# Patient Record
Sex: MALE | NOT HISPANIC OR LATINO | Employment: OTHER | URBAN - METROPOLITAN AREA
[De-identification: names, ages, dates, MRNs, and addresses within clinical notes are randomized per-mention and may not be internally consistent; named-entity substitution may affect disease eponyms.]

---

## 2022-03-03 ENCOUNTER — OFFICE VISIT (OUTPATIENT)
Dept: OBGYN CLINIC | Facility: CLINIC | Age: 87
End: 2022-03-03
Payer: COMMERCIAL

## 2022-03-03 VITALS — HEART RATE: 103 BPM | WEIGHT: 150 LBS | SYSTOLIC BLOOD PRESSURE: 123 MMHG | DIASTOLIC BLOOD PRESSURE: 73 MMHG

## 2022-03-03 DIAGNOSIS — M65.341 TRIGGER FINGER, RIGHT RING FINGER: Primary | ICD-10-CM

## 2022-03-03 PROCEDURE — 20550 NJX 1 TENDON SHEATH/LIGAMENT: CPT | Performed by: ORTHOPAEDIC SURGERY

## 2022-03-03 PROCEDURE — 99203 OFFICE O/P NEW LOW 30 MIN: CPT | Performed by: ORTHOPAEDIC SURGERY

## 2022-03-03 RX ORDER — APIXABAN 5 MG/1
TABLET, FILM COATED ORAL
COMMUNITY
Start: 2022-01-25

## 2022-03-03 RX ORDER — ATORVASTATIN CALCIUM 20 MG/1
TABLET, FILM COATED ORAL
COMMUNITY
Start: 2022-01-25

## 2022-03-03 RX ORDER — LIDOCAINE HYDROCHLORIDE 10 MG/ML
2 INJECTION, SOLUTION INFILTRATION; PERINEURAL
Status: COMPLETED | OUTPATIENT
Start: 2022-03-03 | End: 2022-03-03

## 2022-03-03 RX ORDER — ATENOLOL 25 MG/1
TABLET ORAL
COMMUNITY
Start: 2022-01-25

## 2022-03-03 RX ADMIN — LIDOCAINE HYDROCHLORIDE 2 ML: 10 INJECTION, SOLUTION INFILTRATION; PERINEURAL at 08:35

## 2022-03-03 NOTE — PROGRESS NOTES
Assessment:       1  Trigger finger, right ring finger          Plan:        I explained my current clinical findings to Mr Sonny Recinos  He has a locked right hand ring trigger finger possibly for several weeks or even over a month duration  His clinically able to make a  and hold objects from his right hand  At rest he does not complain of any significant pain in his right hand ring finger  I explained to him that the possibilities include surgical release of the affected digit through Hand Orthopedic surgery or accepting the current deformity if he is able to manage most of his activities of daily living  At this time I will make a referral to my hand orthopedic surgery colleagues in this regard  Subjective:     Patient ID: Roosevelt Bear is a 80 y o  male  Chief Complaint:  Right hand ring finger locked    HPI  Mr Sonny Recinos is a 68-year-old right-hand-dominant gentleman who presents today for evaluation of "locked right hand ring finger"  Patient is a poor historian and a nursing home resident  He reports that sometime ago he was trying to  a ball and his right ring "finger got stuck"  Since this incident, he has been unable to straighten his right hand ring finger  Denies any tingling or numbness of the affected digit distally  Social History     Occupational History    Not on file   Tobacco Use    Smoking status: Not on file    Smokeless tobacco: Not on file   Substance and Sexual Activity    Alcohol use: Not on file    Drug use: Not on file    Sexual activity: Not on file      Review of Systems        Objective:     Ortho ExamPhysical Exam  Vitals and nursing note reviewed  Constitutional:       Appearance: He is well-developed  HENT:      Head: Normocephalic and atraumatic  Eyes:      Conjunctiva/sclera: Conjunctivae normal    Cardiovascular:      Rate and Rhythm: Normal rate and regular rhythm     Pulmonary:      Effort: Pulmonary effort is normal  No respiratory distress  Skin:     General: Skin is warm  Findings: No erythema  Neurological:      Mental Status: He is alert and oriented to person, place, and time  Psychiatric:         Behavior: Behavior normal          Thought Content: Thought content normal          Judgment: Judgment normal           Right hand exam:  Right hand ring finger is an an attitude of flexion at MCP, PIP and DIP joints  There is a tender palpable nodule on the volar aspect of the ring finger at the level of A1 pulley  No overlying skin tearing is noted  Unable to extend his right ring finger  Clinically intact distal neurovascular status  Hand/upper extremity injection: R ring A1  Universal Protocol:  Consent: Verbal consent obtained  Risks and benefits: risks, benefits and alternatives were discussed  Consent given by: patient  Patient understanding: patient states understanding of the procedure being performed  Patient consent: the patient's understanding of the procedure matches consent given  Site marked: the operative site was marked  Radiology Images displayed and confirmed  If images not available, report reviewed: imaging studies available  Required items: required blood products, implants, devices, and special equipment available  Patient identity confirmed: verbally with patient    Supporting Documentation  Indications: therapeutic and pain   Procedure Details  Condition:trigger finger Location: ring finger - R ring A1   Needle size: 25 G  Ultrasound guidance: no  Approach: volar  Medications administered: 2 mL lidocaine 1 %    Patient tolerance: patient tolerated the procedure well with no immediate complications  Dressing:  Sterile dressing applied    The right ring finger flexor tendon sheath was injected with lidocaine and a gentle manipulation under local anesthesia was attempted  Unfortunately, release of the trigger finger despite local anesthesia and manipulation was not achieved

## 2023-04-04 PROBLEM — G30.1 MODERATE LATE ONSET ALZHEIMER'S DEMENTIA WITH ANXIETY (HCC): Status: ACTIVE | Noted: 2023-04-04

## 2023-04-04 PROBLEM — S01.01XA SCALP LACERATION: Status: ACTIVE | Noted: 2023-04-04

## 2023-04-04 PROBLEM — F02.B4 MODERATE LATE ONSET ALZHEIMER'S DEMENTIA WITH ANXIETY (HCC): Status: ACTIVE | Noted: 2023-04-04
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
## 2023-04-04 PROBLEM — I48.91 A-FIB (HCC): Status: ACTIVE | Noted: 2023-04-04

## 2023-04-04 PROBLEM — I48.0 PAF (PAROXYSMAL ATRIAL FIBRILLATION) (HCC): Status: ACTIVE | Noted: 2023-04-04

## 2023-04-05 PROBLEM — D69.6 THROMBOCYTOPENIA (HCC): Status: ACTIVE | Noted: 2023-04-05

## 2023-04-05 PROBLEM — E78.5 HYPERLIPIDEMIA: Status: ACTIVE | Noted: 2023-04-05

## 2023-04-05 PROBLEM — H91.90 HEARING LOSS: Status: ACTIVE | Noted: 2023-04-05

## 2023-04-05 PROBLEM — R73.03 PREDIABETES: Status: ACTIVE | Noted: 2023-04-05

## 2023-04-05 PROBLEM — R79.89 ELEVATED SERUM CREATININE: Status: ACTIVE | Noted: 2023-04-05

## 2023-04-06 PROBLEM — N17.9 AKI (ACUTE KIDNEY INJURY) (HCC): Status: ACTIVE | Noted: 2023-04-05

## 2023-04-06 PROBLEM — A41.9 SEPSIS (HCC): Status: ACTIVE | Noted: 2023-04-06

## 2023-04-09 PROBLEM — M10.9 ACUTE GOUT OF LEFT FOOT: Status: ACTIVE | Noted: 2023-04-09

## 2023-04-11 PROBLEM — N17.9 AKI (ACUTE KIDNEY INJURY) (HCC): Status: RESOLVED | Noted: 2023-04-05 | Resolved: 2023-04-11

## 2023-04-11 PROBLEM — A41.9 SEPSIS (HCC): Status: RESOLVED | Noted: 2023-04-06 | Resolved: 2023-04-11

## 2023-04-25 ENCOUNTER — TELEPHONE (OUTPATIENT)
Dept: OTHER | Facility: OTHER | Age: 88
End: 2023-04-25

## 2023-04-25 NOTE — TELEPHONE ENCOUNTER
Parviz Spangler from [place] is calling regarding cancelling an appointment  Date/Time: 4/25/23 11:40     Patient was rescheduled: YES [] NO [x]    Patient requesting call back to reschedule: YES [] NO [x]    Parviz Spangler states they will call back to reschedule

## 2023-05-08 PROBLEM — Z09 HOSPITAL DISCHARGE FOLLOW-UP: Status: ACTIVE | Noted: 2023-05-08

## 2023-05-08 PROBLEM — I48.0 PAF (PAROXYSMAL ATRIAL FIBRILLATION) (HCC): Status: RESOLVED | Noted: 2023-04-04 | Resolved: 2023-05-08

## 2023-05-09 ENCOUNTER — OFFICE VISIT (OUTPATIENT)
Dept: CARDIOLOGY CLINIC | Facility: CLINIC | Age: 88
End: 2023-05-09

## 2023-05-09 VITALS
SYSTOLIC BLOOD PRESSURE: 98 MMHG | OXYGEN SATURATION: 98 % | DIASTOLIC BLOOD PRESSURE: 50 MMHG | HEART RATE: 50 BPM | BODY MASS INDEX: 22.73 KG/M2 | WEIGHT: 158.8 LBS | HEIGHT: 70 IN
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
## 2023-05-09 DIAGNOSIS — Z09 HOSPITAL DISCHARGE FOLLOW-UP: Primary | ICD-10-CM

## 2023-05-09 DIAGNOSIS — E78.2 MIXED HYPERLIPIDEMIA: ICD-10-CM

## 2023-05-09 DIAGNOSIS — I48.0 PAROXYSMAL ATRIAL FIBRILLATION (HCC): ICD-10-CM

## 2023-05-09 DIAGNOSIS — F02.B4 MODERATE LATE ONSET ALZHEIMER'S DEMENTIA WITH ANXIETY (HCC): ICD-10-CM

## 2023-05-09 DIAGNOSIS — G30.1 MODERATE LATE ONSET ALZHEIMER'S DEMENTIA WITH ANXIETY (HCC): ICD-10-CM

## 2023-05-09 RX ORDER — ALBUTEROL SULFATE 2.5 MG/3ML
SOLUTION RESPIRATORY (INHALATION)
COMMUNITY
Start: 2023-03-08

## 2023-05-09 RX ORDER — APIXABAN 5 MG/1
5 TABLET, FILM COATED ORAL 2 TIMES DAILY
Start: 2023-05-09 | End: 2023-05-09 | Stop reason: SDUPTHER

## 2023-05-09 RX ORDER — IPRATROPIUM BROMIDE AND ALBUTEROL SULFATE 2.5; .5 MG/3ML; MG/3ML
SOLUTION RESPIRATORY (INHALATION)
COMMUNITY
Start: 2023-03-07

## 2023-05-09 RX ORDER — ACETAMINOPHEN 325 MG/1
325 TABLET ORAL EVERY 4 HOURS PRN
COMMUNITY

## 2023-05-09 NOTE — PROGRESS NOTES
Cardiology  Hospital Follow Up   Office Visit Note  Kinza Necessary   80 y o    male   MRN: [id number]  [address]  [address]  [place] [address] [facility]  [address]  [address]  [phone]    PCP: Mary Humphrey MD  Cardiologist: will be Dr Shelia Salmon      @ [facility]                Summary of recommendations  We will discontinue Cardizem CD given bradycardia and hypotension  Follow up will be scheduled with Dr Shelia Salmon        Assessment/plan  Recent unwitnessed fall, requiring hospitalization 4/4-4/11/23 , found to have A-fib with RVR, followed by Cardiology  Elevated rates in the setting of sepsis, fever  Known to have A-fib previously on atenolol, and Eliquis  No traumatic injuries  PAF  · Now rate controlled with metoprolol tartrate 12 5 mg every 12, Cardizem  mg daily  Cardizem new  Previously on atenolol  · We will stop Cardizem given bradycardia and hypotension   · OAC with Eliquis 5 mg twice daily  · EKG today A-fib 49 bpm  Aortic insufficiency mild to moderate  Chronic thrombocytopenia platelets hovering around 90-120K  Hypertension, essential   BP 98/50  DC diltiazem  Hyperlipidemia, off statin therapy  Acute gout left foot, discharged with short course of colchicine  Alz Dementia  Cardiac testing  • TTE 4/5/23  EF 60%  No RWMA  RV cavity dilated  Mild RICKY  Mild to moderate AI  Mild MR  Mild TR   RVSP 41 mmHg  Aortic root normal in size aortic root exhibiting moderate fibrocalcific change                HPI  Kinza Necessary is a 79 yo male with atrial fibrillation on [address] with Eliquis, Alzheimers dementia, hypertension, dyslipidemia  Tu Lout He is a resident at [address] [place] [address] and he does not follow with cardiology  He has been maintained on rate control with atenolol  Adm 4/4-4/11/23  CC: Unwitnessed fall  History limited due to patient's dementia  Was found down  Patient denied chest pain shortness of breath or palpitations    Negative trauma work-up  Rx for Right fabrizio lac and abrasions  Treated for A-fib with RVR heart rates in the 140s, evaluated by Cardiology  Elevated rates were in the setting of sepsis, fever treated by the primary team  Fever 102  Discharged on metoprolol 12 5 mg twice daily, Cardizem  mg daily- new  Atenolol discontinued  DC back to SNF  Rx for acute gout with colchicine 0 6 mg twice daily for a few days    5/9/23  Hospital follow-up  He is accompanied by transport staff  Review of systems unable to given severe dementia  Patient is unable to give me his birthday  Disoriented, x3  EKG A-fib slow ventricular response 49 bpm  BP 98/50  We will DC diltiazem  Continue otherwise current plan     I have spent 25 minutes with Patient today in which greater than 50% of this time was spent in counseling/coordination of care regarding Patient and family education, Importance of tx compliance, Documenting in the medical record and Reviewing / ordering tests, medicine, procedures    Assessment  Diagnoses and all orders for this visit:    Hospital discharge follow-up    Fall, subsequent encounter    Mixed hyperlipidemia    Paroxysmal atrial fibrillation ([facility] [address] )  -     POCT ECG  -     Discontinue: Eliquis 5 MG; Take 1 tablet (5 mg total) by mouth 2 (two) times a day    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 ([facility] [address] )    Other orders  -     ipratropium-albuterol (DUO-NEB) 0 5-2 5 mg/3 mL nebulizer solution  -     albuterol (2 5 mg/3 mL) 0 083 % nebulizer solution  -     acetaminophen (TYLENOL) 325 mg tablet;  Take 325 mg by mouth every 4 (four) hours as needed for mild pain 2 tablets          Past Medical History:   Diagnosis Date   • Anxiety    • Atrial fibrillation (HCC)    • Dementia (HCC)    • Disorder of kidney and ureter    • Dupuytren's disease    • Fibromatosis    • Hyperlipidemia    • Hypertension    • PAF (paroxysmal atrial fibrillation) (HCC)    • Thrombocytopenia (HCC)    • Tinea unguium    • Vascular disease Review of Systems   Unable to perform ROS: dementia       No Known Allergies        Current Outpatient Medications:   •  acetaminophen (TYLENOL) 325 mg tablet, Take 325 mg by mouth every 4 (four) hours as needed for mild pain 2 tablets, Disp: , Rfl:   •  albuterol (2 5 mg/3 mL) 0 083 % nebulizer solution, , Disp: , Rfl:   •  apixaban (ELIQUIS) 5 mg, Take 1 tablet (5 mg total) by mouth 2 (two) times a day, Disp: , Rfl: 0  •  cholecalciferol (VITAMIN D3) 1,000 units tablet, Take 2 tablets (2,000 Units total) by mouth daily Do not start before April 12, 2023 , Disp: , Rfl: 0  •  colchicine (COLCRYS) 0 6 mg tablet, Take 1 tablet (0 6 mg total) by mouth 2 (two) times a day for 5 doses, Disp: 5 tablet, Rfl: 0  •  cyanocobalamin (VITAMIN B-12) 2000 MCG tablet, Take 1 tablet (2,000 mcg total) by mouth daily Do not start before April 12, 2023 , Disp: , Rfl: 0  •  ipratropium-albuterol (DUO-NEB) 0 5-2 5 mg/3 mL nebulizer solution, , Disp: , Rfl:   •  metoprolol tartrate (LOPRESSOR) 25 mg tablet, Take 0 5 tablets (12 5 mg total) by mouth every 12 (twelve) hours, Disp: , Rfl: 0  •  QUEtiapine (SEROquel) 25 mg tablet, Take 0 5 tablets (12 5 mg total) by mouth in the morning, Disp: , Rfl: 0        Social History     Socioeconomic History   • Marital status: /Civil Union     Spouse name: Not on file   • Number of children: Not on file   • Years of education: Not on file   • Highest education level: Not on file   Occupational History   • Not on file   Tobacco Use   • Smoking status: Never     Passive exposure: Never   • Smokeless tobacco: Never   Substance and Sexual Activity   • Alcohol use: Not on file   • Drug use: Not on file   • Sexual activity: Not on file   Other Topics Concern   • Not on file   Social History Narrative    ** Merged History Encounter **          Social Determinants of Health     Financial Resource Strain: Not on file   Food Insecurity: Not on file   Transportation Needs: Not on file   Physical "Activity: Not on file   Stress: Not on file   Social Connections: Not on file   Intimate Partner Violence: Not on file   Housing Stability: Not on file       History reviewed  No pertinent family history  Physical Exam  Vitals and nursing note reviewed  Constitutional:       General: He is not in acute distress  HENT:      Head: Normocephalic and atraumatic  Eyes:      Conjunctiva/sclera: Conjunctivae normal    Cardiovascular:      Rate and Rhythm: Regular rhythm  Bradycardia present  Pulses: Intact distal pulses  Heart sounds: Normal heart sounds  Pulmonary:      Effort: Pulmonary effort is normal       Breath sounds: Normal breath sounds  Abdominal:      General: Bowel sounds are normal       Palpations: Abdomen is soft  Musculoskeletal:         General: Normal range of motion  Cervical back: Normal range of motion and neck supple  Skin:     General: Skin is warm and dry  Neurological:      Mental Status: He is alert  Comments: At baseline         Vitals: Blood pressure 98/50, pulse (!) 50, height 5' 10\" (1 778 m), weight 72 kg (158 lb 12 8 oz), SpO2 98 %  Wt Readings from Last 3 Encounters:   05/09/23 72 kg (158 lb 12 8 oz)   04/05/23 74 kg (163 lb 2 3 oz)   03/03/22 68 kg (150 lb)         Labs & Results:  Lab Results   Component Value Date    WBC 6 77 04/10/2023    HGB 14 6 04/10/2023    HCT 45 1 04/10/2023     (H) 04/10/2023     (L) 04/10/2023     No results found for: BNP  No components found for: CHEM  No results found for: CKTOTAL, TROPONINI, TROPONINT, CKMBINDEX  No results found for this or any previous visit  No results found for this or any previous visit  This note was completed in part utilizing [email or web address] fluency direct voice recognition software     Grammatical errors, random word insertion, spelling mistakes, and incomplete sentences may be an occasional consequence of the system secondary to software limitations, ambient noise and hardware " issues  At the time of dictation, efforts were made to edit, clarify and /or correct errors  Please read the chart carefully and recognize, using context, where substitutions have occurred    If you have any questions or concerns about the context, text or information contained within the body of this dictation, please contact myself, the provider, for further clarification

## 2023-05-09 NOTE — LETTER
May 9, 2023     Cheri Barrientos MD  [address]  [address]  [address] [facility]    Patient: Demetrios Opitz   YOB: 1931   Date of Visit: 5/9/2023       Dear Dr Lakisha Francisco:    Thank you for referring Demetrios Opitz to me for evaluation  Below are my notes for this consultation  If you have questions, please do not hesitate to call me  I look forward to following your patient along with you  Sincerely,        BEBO Gracia        CC: MD John Mcgarry CRNP  5/9/2023 12:07 PM  Sign when [facility] Visit  Cardiology  Hospital Follow Up   Office Visit Note  Demetrios Opitz   80 y o    male   MRN: [id number]  [address]  [address]  [address] [address] [phone]    PCP: Cheri Barrientos MD  Cardiologist: will be Dr Daniel Dey      @ [facility]                Summary of recommendations  We will discontinue Cardizem CD given bradycardia and hypotension  Follow up will be scheduled with Dr Daniel Dey        Assessment/plan  Recent unwitnessed fall, requiring hospitalization 4/4-4/11/23 , found to have A-fib with RVR, followed by Cardiology  Elevated rates in the setting of sepsis, fever  Known to have A-fib previously on atenolol, and Eliquis  No traumatic injuries  PAF  · Now rate controlled with metoprolol tartrate 12 5 mg every 12, Cardizem  mg daily  Cardizem new  Previously on atenolol  · We will stop Cardizem given bradycardia and hypotension   · OAC with Eliquis 5 mg twice daily  · EKG today A-fib 49 bpm  Aortic insufficiency mild to moderate  Chronic thrombocytopenia platelets hovering around 90-120K  Hypertension, essential   BP 98/50  DC diltiazem  Hyperlipidemia, off statin therapy  Acute gout left foot, discharged with short course of colchicine  Alz Dementia  Cardiac testing  • TTE 4/5/23  EF 60%  No RWMA  RV cavity dilated  Mild RICKY  Mild to moderate AI  Mild MR  Mild TR   RVSP 41 mmHg  Aortic root normal in size aortic root exhibiting moderate fibrocalcific change                HPI  Tariq Hernandez is a 79 yo male with atrial fibrillation on [address] with Eliquis, Alzheimers dementia, hypertension, dyslipidemia  Sushila Toussaint He is a resident at [facility] and he does not follow with cardiology  He has been maintained on rate control with atenolol  Adm 4/4-4/11/23  CC: Unwitnessed fall  History limited due to patient's dementia  Was found down  Patient denied chest pain shortness of breath or palpitations  Negative trauma work-up  Rx for Right fabrizio lac and abrasions  Treated for A-fib with RVR heart rates in the 140s, evaluated by Cardiology  Elevated rates were in the setting of sepsis, fever treated by the primary team  Fever 102  Discharged on metoprolol 12 5 mg twice daily, Cardizem  mg daily- new  Atenolol discontinued  DC back to SNF  Rx for acute gout with colchicine 0 6 mg twice daily for a few days    5/9/23  Hospital follow-up  He is accompanied by transport staff  Review of systems unable to given severe dementia  Patient is unable to give me his birthday  Disoriented, x3  EKG A-fib slow ventricular response 49 bpm  BP 98/50  We will DC diltiazem  Continue otherwise current plan     I have spent 25 minutes with Patient today in which greater than 50% of this time was spent in counseling/coordination of care regarding Patient and family education, Importance of tx compliance, Documenting in the medical record and Reviewing / ordering tests, medicine, procedures    Assessment  Diagnoses and all orders for this visit:    Hospital discharge follow-up    Fall, subsequent encounter    Mixed hyperlipidemia    Paroxysmal atrial fibrillation ([facility] [address] )  -     POCT ECG  -     Discontinue: Eliquis 5 MG;  Take 1 tablet (5 mg total) by mouth 2 (two) times a day    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 ([address] )    Other orders  - ipratropium-albuterol (DUO-NEB) 0 5-2 5 mg/3 mL nebulizer solution  -     albuterol (2 5 mg/3 mL) 0 083 % nebulizer solution  -     acetaminophen (TYLENOL) 325 mg tablet; Take 325 mg by mouth every 4 (four) hours as needed for mild pain 2 tablets          Past Medical History:   Diagnosis Date   • Anxiety    • Atrial fibrillation (HCC)    • Dementia (HCC)    • Disorder of kidney and ureter    • Dupuytren's disease    • Fibromatosis    • Hyperlipidemia    • Hypertension    • PAF (paroxysmal atrial fibrillation) (HCC)    • Thrombocytopenia (HCC)    • Tinea unguium    • Vascular disease        Review of Systems   Unable to perform ROS: dementia       No Known Allergies        Current Outpatient Medications:   •  acetaminophen (TYLENOL) 325 mg tablet, Take 325 mg by mouth every 4 (four) hours as needed for mild pain 2 tablets, Disp: , Rfl:   •  albuterol (2 5 mg/3 mL) 0 083 % nebulizer solution, , Disp: , Rfl:   •  apixaban (ELIQUIS) 5 mg, Take 1 tablet (5 mg total) by mouth 2 (two) times a day, Disp: , Rfl: 0  •  cholecalciferol (VITAMIN D3) 1,000 units tablet, Take 2 tablets (2,000 Units total) by mouth daily Do not start before April 12, 2023 , Disp: , Rfl: 0  •  colchicine (COLCRYS) 0 6 mg tablet, Take 1 tablet (0 6 mg total) by mouth 2 (two) times a day for 5 doses, Disp: 5 tablet, Rfl: 0  •  cyanocobalamin (VITAMIN B-12) 2000 MCG tablet, Take 1 tablet (2,000 mcg total) by mouth daily Do not start before April 12, 2023 , Disp: , Rfl: 0  •  ipratropium-albuterol (DUO-NEB) 0 5-2 5 mg/3 mL nebulizer solution, , Disp: , Rfl:   •  metoprolol tartrate (LOPRESSOR) 25 mg tablet, Take 0 5 tablets (12 5 mg total) by mouth every 12 (twelve) hours, Disp: , Rfl: 0  •  QUEtiapine (SEROquel) 25 mg tablet, Take 0 5 tablets (12 5 mg total) by mouth in the morning, Disp: , Rfl: 0        Social History     Socioeconomic History   • Marital status: /Civil Union     Spouse name: Not on file   • Number of children: Not on file   • "Years of education: Not on file   • Highest education level: Not on file   Occupational History   • Not on file   Tobacco Use   • Smoking status: Never     Passive exposure: Never   • Smokeless tobacco: Never   Substance and Sexual Activity   • Alcohol use: Not on file   • Drug use: Not on file   • Sexual activity: Not on file   Other Topics Concern   • Not on file   Social History Narrative    ** Merged History Encounter **          Social Determinants of Health     Financial Resource Strain: Not on file   Food Insecurity: Not on file   Transportation Needs: Not on file   Physical Activity: Not on file   Stress: Not on file   Social Connections: Not on file   Intimate Partner Violence: Not on file   Housing Stability: Not on file       History reviewed  No pertinent family history  Physical Exam  Vitals and nursing note reviewed  Constitutional:       General: He is not in acute distress  HENT:      Head: Normocephalic and atraumatic  Eyes:      Conjunctiva/sclera: Conjunctivae normal    Cardiovascular:      Rate and Rhythm: Regular rhythm  Bradycardia present  Pulses: Intact distal pulses  Heart sounds: Normal heart sounds  Pulmonary:      Effort: Pulmonary effort is normal       Breath sounds: Normal breath sounds  Abdominal:      General: Bowel sounds are normal       Palpations: Abdomen is soft  Musculoskeletal:         General: Normal range of motion  Cervical back: Normal range of motion and neck supple  Skin:     General: Skin is warm and dry  Neurological:      Mental Status: He is alert  Comments: At baseline         Vitals: Blood pressure 98/50, pulse (!) 50, height 5' 10\" (1 778 m), weight 72 kg (158 lb 12 8 oz), SpO2 98 %     Wt Readings from Last 3 Encounters:   05/09/23 72 kg (158 lb 12 8 oz)   04/05/23 74 kg (163 lb 2 3 oz)   03/03/22 68 kg (150 lb)         Labs & Results:  Lab Results   Component Value Date    WBC 6 77 04/10/2023    HGB 14 6 04/10/2023    HCT " 45 1 04/10/2023     (H) 04/10/2023     (L) 04/10/2023     No results found for: BNP  No components found for: CHEM  No results found for: CKTOTAL, TROPONINI, TROPONINT, CKMBINDEX  No results found for this or any previous visit  No results found for this or any previous visit  This note was completed in part utilizing [employer] fluency direct voice recognition software  Grammatical errors, random word insertion, spelling mistakes, and incomplete sentences may be an occasional consequence of the system secondary to software limitations, ambient noise and hardware issues  At the time of dictation, efforts were made to edit, clarify and /or correct errors  Please read the chart carefully and recognize, using context, where substitutions have occurred    If you have any questions or concerns about the context, text or information contained within the body of this dictation, please contact myself, the provider, for further clarification

## 2023-06-03 PROBLEM — S01.01XA SCALP LACERATION: Status: RESOLVED | Noted: 2023-04-04 | Resolved: 2023-06-03

## 2023-08-17 ENCOUNTER — OFFICE VISIT (OUTPATIENT)
Dept: CARDIOLOGY CLINIC | Facility: CLINIC | Age: 88
End: 2023-08-17
Payer: COMMERCIAL

## 2023-08-17 VITALS
WEIGHT: 157.2 LBS | OXYGEN SATURATION: 97 % | HEART RATE: 126 BPM | DIASTOLIC BLOOD PRESSURE: 62 MMHG | HEIGHT: 70 IN | BODY MASS INDEX: 22.5 KG/M2 | SYSTOLIC BLOOD PRESSURE: 116 MMHG

## 2023-08-17 DIAGNOSIS — F02.80 ALZHEIMER'S DEMENTIA, UNSPECIFIED DEMENTIA SEVERITY, UNSPECIFIED TIMING OF DEMENTIA ONSET, UNSPECIFIED WHETHER BEHAVIORAL, PSYCHOTIC, OR MOOD DISTURBANCE OR ANXIETY (HCC): ICD-10-CM

## 2023-08-17 DIAGNOSIS — G30.9 ALZHEIMER'S DEMENTIA, UNSPECIFIED DEMENTIA SEVERITY, UNSPECIFIED TIMING OF DEMENTIA ONSET, UNSPECIFIED WHETHER BEHAVIORAL, PSYCHOTIC, OR MOOD DISTURBANCE OR ANXIETY (HCC): ICD-10-CM

## 2023-08-17 DIAGNOSIS — E78.2 MIXED HYPERLIPIDEMIA: ICD-10-CM

## 2023-08-17 DIAGNOSIS — I48.20 CHRONIC ATRIAL FIBRILLATION WITH RAPID VENTRICULAR RESPONSE (HCC): Primary | ICD-10-CM

## 2023-08-17 PROCEDURE — 93000 ELECTROCARDIOGRAM COMPLETE: CPT | Performed by: INTERNAL MEDICINE

## 2023-08-17 PROCEDURE — 99214 OFFICE O/P EST MOD 30 MIN: CPT | Performed by: INTERNAL MEDICINE

## 2023-08-17 RX ORDER — ALUMINUM HYDROXIDE, MAGNESIUM HYDROXIDE, SIMETHICONE 400; 400; 40 MG/10ML; MG/10ML; MG/10ML
30 SUSPENSION ORAL
COMMUNITY

## 2023-08-17 RX ORDER — LOPERAMIDE HYDROCHLORIDE 2 MG/1
2 TABLET ORAL 4 TIMES DAILY PRN
COMMUNITY

## 2023-08-17 NOTE — PROGRESS NOTES
[facility] [place]   [address]  [address]  [facility] [address]                                            Cardiology Office Follow up  Ana Bryan, 80 y.o. male  YOB: 1931  MRN: [id number] Encounter: [id number]      PCP - Annie Valverde MD  Referring Provider - Self, Referral    Chief Complaint   Patient presents with   • Follow-up     3 month check up per Josie Ha A-Fib : Pt resides in [address]        Assessment  Chronic Atrial fibrillation  Alzheimer's dementia  Hypertension  H/o Falls    Plan  Chronic atrial fibrillation with RVR  Overview  4/2023: admitted after unwitnessed fall, with possible sepsis, noted to have elevated HR  I saw inpatient and uptitrate metoprolol to 75 mg bid, cardizem  mg daily  Prior to discharge, his HR was too slow -->  decreased metoprolol to 12.5 mg bid  5/9/2023: saw Josie Ha --> Afib, HR 49 bpm --> cardizem  discontinued  8/2023: HR in afib again elevated at 125 bpm  Impression  HR in Afib has been labile, he is asymptomatic otherwise, although cannot provide much history  Plan  Increase metoprolol to 25 mg twice daily  Monitor HR daily at [facility],  And notify us if HR persistently > 120 bpm  Continue eliquis    Results for orders placed or performed in visit on 08/17/23   POCT ECG    Impression    Atrial fibrillation with rapid ventricular response ( bpm)  Nonspecific T wave changes       Orders Placed This Encounter   Procedures   • POCT ECG     Return in about 4 weeks (around 9/14/2023), or if symptoms worsen or fail to improve. History of Present Illness   80 y.o. male , whom I originally met at [address] [place] [address] in April 2023 when he was admitted from [address] [place] [address] with an unwitnessed fall. At that time was noted to have atrial fibrillation with rapid rates. He was noted to have sepsis as well.   His rate control meds for A-fib were adjusted with improvement, but towards the end of hospitalization he became bradycardic, and as a result metoprolol was decreased. He has been in [address] [place] [address] since then and did have a follow-up visit with Hadley Uribe in May 2023. At that time his heart rate continued to be slow and as a result Cardizem was also discontinued. Interval history - 8/17/2023  Today he is here for follow-up and does not report any chest pain, shortness of breath, or any palpitation, although with his dementia his history is quite unreliable    Historical Information   Past Medical History:   Diagnosis Date   • Anxiety    • Atrial fibrillation ([address])    • Dementia ([address])    • Disorder of kidney and ureter    • Dupuytren's disease    • Fibromatosis    • Hyperlipidemia    • Hypertension    • PAF (paroxysmal atrial fibrillation) ([address])    • Thrombocytopenia (HCC)    • Tinea unguium    • Vascular disease      History reviewed. No pertinent surgical history.   Family History   Problem Relation Age of Onset   • No Known Problems Mother    • No Known Problems Father      Current Outpatient Medications on File Prior to Visit   Medication Sig Dispense Refill   • acetaminophen (TYLENOL) 325 mg tablet Take 325 mg by mouth every 4 (four) hours as needed for mild pain 2 tablets     • aluminum-magnesium hydroxide-simethicone (MAALOX) 200-200-20 MG/5ML SUSP Take 30 mL by mouth 4 (four) times a day (before meals and at bedtime)     • apixaban (ELIQUIS) 5 mg Take 1 tablet (5 mg total) by mouth 2 (two) times a day  0   • cholecalciferol (VITAMIN D3) 1,000 units tablet Take 2 tablets (2,000 Units total) by mouth daily Do not start before April 12, 2023.  0   • cyanocobalamin (VITAMIN B-12) 2000 MCG tablet Take 1 tablet (2,000 mcg total) by mouth daily Do not start before April 12, 2023.  0   • loperamide (Imodium A-D) 2 MG tablet Take 2 mg by mouth 4 (four) times a day as needed for diarrhea     • [DISCONTINUED] metoprolol tartrate (LOPRESSOR) 25 mg tablet Take 0.5 tablets (12.5 mg total) by mouth every 12 (twelve) hours  0   • albuterol (2.5 mg/3 mL) 0.083 % nebulizer solution      • colchicine (COLCRYS) 0.6 mg tablet Take 1 tablet (0.6 mg total) by mouth 2 (two) times a day for 5 doses 5 tablet 0   • ipratropium-albuterol (DUO-NEB) 0.5-2.5 mg/3 mL nebulizer solution  (Patient not taking: Reported on 8/17/2023)     • QUEtiapine (SEROquel) 25 mg tablet Take 0.5 tablets (12.5 mg total) by mouth in the morning (Patient not taking: Reported on 8/17/2023)  0     No current facility-administered medications on file prior to visit. No Known Allergies  Social History     Socioeconomic History   • Marital status: /Civil Union     Spouse name: None   • Number of children: None   • Years of education: None   • Highest education level: None   Occupational History   • None   Tobacco Use   • Smoking status: Never     Passive exposure: Never   • Smokeless tobacco: Never   Vaping Use   • Vaping Use: Never used   Substance and Sexual Activity   • Alcohol use: None   • Drug use: None   • Sexual activity: None   Other Topics Concern   • None   Social History Narrative    ** Merged History Encounter **          Social Determinants of Health     Financial Resource Strain: Not on file   Food Insecurity: Not on file   Transportation Needs: Not on file   Physical Activity: Not on file   Stress: Not on file   Social Connections: Not on file   Intimate Partner Violence: Not on file   Housing Stability: Not on file        Review of Systems   All other systems reviewed and are negative. Vitals:  Vitals:    08/17/23 1026   BP: 116/62   BP Location: Left arm   Patient Position: Sitting   Cuff Size: Large   Pulse: (!) 126   SpO2: 97%   Weight: 71.3 kg (157 lb 3.2 oz)   Height: 5' 10" (1.778 m)     BMI - Body mass index is 22.56 kg/m².   Wt Readings from Last 7 Encounters:   08/17/23 71.3 kg (157 lb 3.2 oz)   05/09/23 72 kg (158 lb 12.8 oz)   04/05/23 74 kg (163 lb 2.3 oz)   03/03/22 68 kg (150 lb)       Physical Exam  Vitals and nursing note reviewed. Constitutional:       General: He is not in acute distress. Appearance: Normal appearance. He is well-developed and normal weight. He is not ill-appearing. HENT:      Head: Normocephalic and atraumatic. Nose: No congestion. Eyes:      General: No scleral icterus. Conjunctiva/sclera: Conjunctivae normal.   Neck:      Vascular: No carotid bruit or JVD. Cardiovascular:      Rate and Rhythm: Tachycardia present. Rhythm irregular. Heart sounds: Normal heart sounds. No murmur heard. No friction rub. No gallop. Pulmonary:      Effort: Pulmonary effort is normal. No respiratory distress. Breath sounds: Normal breath sounds. No wheezing or rales. Chest:      Chest wall: No tenderness. Abdominal:      General: There is no distension. Palpations: Abdomen is soft. Tenderness: There is no abdominal tenderness. Musculoskeletal:         General: No swelling, tenderness or deformity. Cervical back: Neck supple. No muscular tenderness. Right lower leg: No edema. Left lower leg: No edema. Skin:     General: Skin is warm. Neurological:      Mental Status: He is alert. Mental status is at baseline. Psychiatric:         Mood and Affect: Mood normal.         Speech: Speech normal.         Behavior: Behavior normal. Behavior is cooperative. Cognition and Memory: Cognition is impaired. Memory is impaired.            Labs:  CBC:   Lab Results   Component Value Date    WBC 6.77 04/10/2023    RBC 4.47 04/10/2023    HGB 14.6 04/10/2023    HCT 45.1 04/10/2023     (H) 04/10/2023     (L) 04/10/2023    RDW 13.1 04/10/2023       CMP:   Lab Results   Component Value Date    K 4.5 04/10/2023     04/10/2023    CO2 25 04/10/2023    BUN 24 04/10/2023    CREATININE 1.06 04/10/2023    EGFR 61 04/10/2023    CALCIUM 8.9 04/10/2023    AST 29 04/06/2023    ALT 12 04/06/2023    ALKPHOS 52 04/06/2023       Magnesium:  Lab Results   Component Value Date    MG 2.1 04/06/2023       Lipid Profile:   Lab Results   Component Value Date    HDL 67 04/05/2023    TRIG 77 04/05/2023    LDLCALC 90 04/05/2023       Thyroid Studies:   Lab Results   Component Value Date    VEL0LGCXMEQY 1.133 04/05/2023    FREET4 0.95 04/05/2023       A1c:  No components found for: "HGA1C"    INR:  No results found for: "INR"5    Imaging: No results found. Cardiac testing:   No results found for this or any previous visit. No results found for this or any previous visit. No results found for this or any previous visit. No results found for this or any previous visit. XR chest portable  Narrative: CHEST RADIOGRAPH    INDICATION:   fever. COMPARISON:  Chest radiograph 4/6/2023. EXAM PERFORMED/VIEWS:  XR CHEST PORTABLE  Technique: AP portable semierect. Images:  1. FINDINGS:      Lines/Tubes/Devices: None. Mediastinum: Cardiomediastinal silhouette appears within normal limits. Lungs: Mild pulmonary vascular congestion which is slightly improved from most recent exam. Worsened left retrocardiac airspace opacity. Pleura: No pleural effusion. No pneumothorax within the limitations of a portable exam.    Bones: Osseous structures appear within normal limits for patient age. Impression: Worsened retrocardiac airspace opacity at the left lung base concerning for aspiration and/or pneumonia.        Workstation performed: [id number]

## 2023-10-16 ENCOUNTER — TELEPHONE (OUTPATIENT)
Age: 88
End: 2023-10-16

## 2023-10-16 NOTE — TELEPHONE ENCOUNTER
Caller: Patient     Doctor/Office: n/a    Call regarding :  great toe fx      Call was transferred to: POD

## 2023-10-16 NOTE — TELEPHONE ENCOUNTER
Hello,  Please advise if the following patient can be forced onto the schedule:    Patient: Winsome Parker    : 1931    MRN: [id number]    Call back #: [phone]/NEJJO the transport person is arranging this appt. Insurance: Medicare    Reason for appointment: FX of left grt toe     Requested doctor/location: Dr. Dyan Desai      Thank you.

## 2023-10-17 ENCOUNTER — TELEPHONE (OUTPATIENT)
Dept: PODIATRY | Facility: CLINIC | Age: 88
End: 2023-10-17

## 2023-10-17 NOTE — TELEPHONE ENCOUNTER
Caller: [facility]ma[place]    Doctor: Pete Cho DPM    Reason for call: Retia Peabody has a non displaced fx, left great toe. X-ray report being faxed to the office. X-rays taken at [facility].    Please call with an appointment    Call back:  [phone] - Padmaja

## 2023-10-19 ENCOUNTER — OFFICE VISIT (OUTPATIENT)
Dept: PODIATRY | Facility: CLINIC | Age: 88
End: 2023-10-19

## 2023-10-19 VITALS
HEIGHT: 70 IN | OXYGEN SATURATION: 99 % | DIASTOLIC BLOOD PRESSURE: 96 MMHG | HEART RATE: 90 BPM | BODY MASS INDEX: 22.56 KG/M2 | SYSTOLIC BLOOD PRESSURE: 166 MMHG

## 2023-10-19 DIAGNOSIS — S92.415A CLOSED NONDISPLACED FRACTURE OF PROXIMAL PHALANX OF LEFT GREAT TOE, INITIAL ENCOUNTER: Primary | ICD-10-CM

## 2023-10-19 NOTE — PROGRESS NOTES
Assessment/Plan:     The patient's clinical examination today significant for ecchymosis to the dorsal lateral aspect of the left great toe along the base of the proximal phalanx. Mild tenderness to the palpation is noted with palpation to the base of the proximal phalanx of the left great toe. There is very mild tenderness with passive range of motion of the first MTPJ. There are no open lesions. Neurovascular status is grossly intact. The patient's recent x-ray report of the left foot dated October 15, 2023 was appreciated and was significant for a nondisplaced intra-articular avulsion fracture at the medial aspect at the base of the proximal phalanx of the left great toe. The patient is doing fairly well from a clinical standpoint. There is very mild edema and resolving ecchymosis to the left great toe. There is relatively mild tenderness palpation although the patient is not a reliable historian given his history of Alzheimer's dementia. He does not appear to be in any significant distress today nor with palpation and passive range of motion of the left great toe joint. I would recommend that we continue guarded weightbearing to the left foot with his surgical shoe for at least another 4 weeks. I recommend follow-up in my office in 2 weeks for repeat x-rays of the left foot to assess healing of the fracture site. Diagnoses and all orders for this visit:    Closed nondisplaced fracture of proximal phalanx of left great toe, initial encounter          Subjective:     Patient ID: Corbin Hercules is a 80 y.o. male. The patient presents today for his initial consultation with [facility] podiatry with a chief complaint of a left great toe fracture. He was sent from a local nursing home where x-rays dated October 15, 2023 revealed a nondisplaced fracture to his left great toe. He is currently in a surgical shoe.   He is unsure of how the fracture occurred, however he does note that he may have stopped his foot against furniture at home. The patient is a poor historian secondary to his history of Alzheimer's dementia. PAST MEDICAL HISTORY:  Past Medical History:   Diagnosis Date    Anxiety     Atrial fibrillation (HCC)     Dementia (HCC)     Disorder of kidney and ureter     Dupuytren's disease     Fibromatosis     Hyperlipidemia     Hypertension     PAF (paroxysmal atrial fibrillation) ([facility])     Thrombocytopenia (HCC)     Tinea unguium     Vascular disease        PAST SURGICAL HISTORY:  History reviewed. No pertinent surgical history. ALLERGIES:  Patient has no known allergies.     MEDICATIONS:  Current Outpatient Medications   Medication Sig Dispense Refill    acetaminophen (TYLENOL) 325 mg tablet Take 325 mg by mouth every 4 (four) hours as needed for mild pain 2 tablets      albuterol (2.5 mg/3 mL) 0.083 % nebulizer solution       aluminum-magnesium hydroxide-simethicone (MAALOX) 200-200-20 MG/5ML SUSP Take 30 mL by mouth 4 (four) times a day (before meals and at bedtime)      apixaban (ELIQUIS) 5 mg Take 1 tablet (5 mg total) by mouth 2 (two) times a day  0    cholecalciferol (VITAMIN D3) 1,000 units tablet Take 2 tablets (2,000 Units total) by mouth daily Do not start before April 12, 2023.  0    cyanocobalamin (VITAMIN B-12) 2000 MCG tablet Take 1 tablet (2,000 mcg total) by mouth daily Do not start before April 12, 2023.  0    loperamide (Imodium A-D) 2 MG tablet Take 2 mg by mouth 4 (four) times a day as needed for diarrhea      metoprolol tartrate (LOPRESSOR) 25 mg tablet Take 1 tablet (25 mg total) by mouth every 12 (twelve) hours 60 tablet 2    colchicine (COLCRYS) 0.6 mg tablet Take 1 tablet (0.6 mg total) by mouth 2 (two) times a day for 5 doses 5 tablet 0    ipratropium-albuterol (DUO-NEB) 0.5-2.5 mg/3 mL nebulizer solution  (Patient not taking: Reported on 8/17/2023)      QUEtiapine (SEROquel) 25 mg tablet Take 0.5 tablets (12.5 mg total) by mouth in the morning (Patient not taking: Reported on 8/17/2023)  0     No current facility-administered medications for this visit. SOCIAL HISTORY:  Social History     Socioeconomic History    Marital status: /Civil Union     Spouse name: None    Number of children: None    Years of education: None    Highest education level: None   Occupational History    None   Tobacco Use    Smoking status: Never     Passive exposure: Never    Smokeless tobacco: Never   Vaping Use    Vaping Use: Never used   Substance and Sexual Activity    Alcohol use: None    Drug use: None    Sexual activity: None   Other Topics Concern    None   Social History Narrative    ** Merged History Encounter **          Social Determinants of Health     Financial Resource Strain: Not on file   Food Insecurity: Not on file   Transportation Needs: Not on file   Physical Activity: Not on file   Stress: Not on file   Social Connections: Not on file   Intimate Partner Violence: Not on file   Housing Stability: Not on file        Review of Systems   Unable to perform ROS: Dementia         Objective:     Physical Exam  Vitals reviewed. Constitutional:       Appearance: Normal appearance. HENT:      Head: Normocephalic and atraumatic. Nose: Nose normal.   Eyes:      Conjunctiva/sclera: Conjunctivae normal.      Pupils: Pupils are equal, round, and reactive to light. Cardiovascular:      Pulses:           Dorsalis pedis pulses are 1+ on the right side and 1+ on the left side. Posterior tibial pulses are 1+ on the right side and 1+ on the left side. Pulmonary:      Effort: Pulmonary effort is normal.   Musculoskeletal:        Feet:    Feet:      Comments: The patient's clinical examination today significant for ecchymosis to the dorsal lateral aspect of the left great toe along the base of the proximal phalanx. Mild tenderness to the palpation is noted with palpation to the base of the proximal phalanx of the left great toe.   There is very mild tenderness with passive range of motion of the first MTPJ. There are no open lesions. Neurovascular status is grossly intact. Skin:     General: Skin is warm. Capillary Refill: Capillary refill takes 2 to 3 seconds. Neurological:      General: No focal deficit present. Mental Status: He is alert and oriented to person, place, and time. Psychiatric:         Mood and Affect: Mood normal.         Behavior: Behavior normal.         Thought Content:  Thought content normal.

## 2023-11-02 ENCOUNTER — TELEPHONE (OUTPATIENT)
Age: 88
End: 2023-11-02

## 2024-01-25 ENCOUNTER — APPOINTMENT (EMERGENCY)
Dept: RADIOLOGY | Facility: HOSPITAL | Age: 89
DRG: 871 | End: 2024-01-25
Payer: COMMERCIAL

## 2024-01-25 ENCOUNTER — HOSPITAL ENCOUNTER (INPATIENT)
Facility: HOSPITAL | Age: 89
LOS: 7 days | Discharge: NURSING, REHAB OR LONG-TERM CARE | DRG: 871 | End: 2024-02-01
Attending: EMERGENCY MEDICINE | Admitting: INTERNAL MEDICINE
Payer: COMMERCIAL

## 2024-01-25 DIAGNOSIS — I48.91 ATRIAL FIBRILLATION (HCC): ICD-10-CM

## 2024-01-25 DIAGNOSIS — J18.9 MULTIFOCAL PNEUMONIA: ICD-10-CM

## 2024-01-25 DIAGNOSIS — R06.00 DYSPNEA: ICD-10-CM

## 2024-01-25 DIAGNOSIS — K59.00 CONSTIPATED: ICD-10-CM

## 2024-01-25 DIAGNOSIS — F03.90 DEMENTIA (HCC): ICD-10-CM

## 2024-01-25 DIAGNOSIS — A41.9 SEVERE SEPSIS (HCC): Primary | ICD-10-CM

## 2024-01-25 DIAGNOSIS — R09.02 HYPOXIA: ICD-10-CM

## 2024-01-25 DIAGNOSIS — R65.20 SEVERE SEPSIS (HCC): Primary | ICD-10-CM

## 2024-01-25 DIAGNOSIS — J18.9 PNEUMONIA: ICD-10-CM

## 2024-01-25 PROBLEM — J96.01 ACUTE HYPOXIC RESPIRATORY FAILURE (HCC): Status: ACTIVE | Noted: 2024-01-25

## 2024-01-25 LAB
2HR DELTA HS TROPONIN: 2 NG/L
4HR DELTA HS TROPONIN: 6 NG/L
ALBUMIN SERPL BCP-MCNC: 4.3 G/DL (ref 3.5–5)
ALP SERPL-CCNC: 70 U/L (ref 34–104)
ALT SERPL W P-5'-P-CCNC: 13 U/L (ref 7–52)
ANION GAP SERPL CALCULATED.3IONS-SCNC: 9 MMOL/L
APTT PPP: 28 SECONDS (ref 23–37)
AST SERPL W P-5'-P-CCNC: 21 U/L (ref 13–39)
ATRIAL RATE: 147 BPM
ATRIAL RATE: 174 BPM
BASE EX.OXY STD BLDV CALC-SCNC: 69.6 % (ref 60–80)
BASE EX.OXY STD BLDV CALC-SCNC: 83 % (ref 60–80)
BASE EXCESS BLDV CALC-SCNC: -2.1 MMOL/L
BASE EXCESS BLDV CALC-SCNC: -4 MMOL/L
BASOPHILS # BLD AUTO: 0.05 THOUSANDS/ÂΜL (ref 0–0.1)
BASOPHILS NFR BLD AUTO: 0 % (ref 0–1)
BILIRUB SERPL-MCNC: 0.82 MG/DL (ref 0.2–1)
BILIRUB UR QL STRIP: NEGATIVE
BNP SERPL-MCNC: 365 PG/ML (ref 0–100)
BUN SERPL-MCNC: 36 MG/DL (ref 5–25)
CALCIUM SERPL-MCNC: 9.5 MG/DL (ref 8.4–10.2)
CARDIAC TROPONIN I PNL SERPL HS: 11 NG/L
CARDIAC TROPONIN I PNL SERPL HS: 13 NG/L
CARDIAC TROPONIN I PNL SERPL HS: 17 NG/L
CHLORIDE SERPL-SCNC: 105 MMOL/L (ref 96–108)
CLARITY UR: CLEAR
CO2 SERPL-SCNC: 24 MMOL/L (ref 21–32)
COLOR UR: NORMAL
CREAT SERPL-MCNC: 1.37 MG/DL (ref 0.6–1.3)
D DIMER PPP FEU-MCNC: 0.88 UG/ML FEU
EOSINOPHIL # BLD AUTO: 0.05 THOUSAND/ÂΜL (ref 0–0.61)
EOSINOPHIL NFR BLD AUTO: 0 % (ref 0–6)
ERYTHROCYTE [DISTWIDTH] IN BLOOD BY AUTOMATED COUNT: 13.5 % (ref 11.6–15.1)
FLUAV RNA RESP QL NAA+PROBE: NEGATIVE
FLUBV RNA RESP QL NAA+PROBE: NEGATIVE
GFR SERPL CREATININE-BSD FRML MDRD: 44 ML/MIN/1.73SQ M
GLUCOSE SERPL-MCNC: 117 MG/DL (ref 65–140)
GLUCOSE UR STRIP-MCNC: NEGATIVE MG/DL
HCO3 BLDV-SCNC: 21 MMOL/L (ref 24–30)
HCO3 BLDV-SCNC: 21.6 MMOL/L (ref 24–30)
HCT VFR BLD AUTO: 49.4 % (ref 36.5–49.3)
HGB BLD-MCNC: 16.1 G/DL (ref 12–17)
HGB UR QL STRIP.AUTO: NEGATIVE
IMM GRANULOCYTES # BLD AUTO: 0.07 THOUSAND/UL (ref 0–0.2)
IMM GRANULOCYTES NFR BLD AUTO: 1 % (ref 0–2)
INR PPP: 1.05 (ref 0.84–1.19)
KETONES UR STRIP-MCNC: NEGATIVE MG/DL
L PNEUMO1 AG UR QL IA.RAPID: NEGATIVE
LACTATE SERPL-SCNC: 1.3 MMOL/L (ref 0.5–2)
LACTATE SERPL-SCNC: 3.5 MMOL/L (ref 0.5–2)
LEUKOCYTE ESTERASE UR QL STRIP: NEGATIVE
LIPASE SERPL-CCNC: 36 U/L (ref 11–82)
LYMPHOCYTES # BLD AUTO: 2.51 THOUSANDS/ÂΜL (ref 0.6–4.47)
LYMPHOCYTES NFR BLD AUTO: 17 % (ref 14–44)
MAGNESIUM SERPL-MCNC: 1.8 MG/DL (ref 1.9–2.7)
MCH RBC QN AUTO: 33.6 PG (ref 26.8–34.3)
MCHC RBC AUTO-ENTMCNC: 32.6 G/DL (ref 31.4–37.4)
MCV RBC AUTO: 103 FL (ref 82–98)
MONOCYTES # BLD AUTO: 1.14 THOUSAND/ÂΜL (ref 0.17–1.22)
MONOCYTES NFR BLD AUTO: 8 % (ref 4–12)
NEUTROPHILS # BLD AUTO: 10.62 THOUSANDS/ÂΜL (ref 1.85–7.62)
NEUTS SEG NFR BLD AUTO: 74 % (ref 43–75)
NITRITE UR QL STRIP: NEGATIVE
NRBC BLD AUTO-RTO: 0 /100 WBCS
O2 CT BLDV-SCNC: 17.1 ML/DL
O2 CT BLDV-SCNC: 18 ML/DL
PCO2 BLDV: 34.5 MM HG (ref 42–50)
PCO2 BLDV: 38.6 MM HG (ref 42–50)
PH BLDV: 7.35 [PH] (ref 7.3–7.4)
PH BLDV: 7.42 [PH] (ref 7.3–7.4)
PH UR STRIP.AUTO: 5.5 [PH]
PLATELET # BLD AUTO: 145 THOUSANDS/UL (ref 149–390)
PMV BLD AUTO: 10.3 FL (ref 8.9–12.7)
PO2 BLDV: 40.3 MM HG (ref 35–45)
PO2 BLDV: 51.6 MM HG (ref 35–45)
POTASSIUM SERPL-SCNC: 4.7 MMOL/L (ref 3.5–5.3)
PROT SERPL-MCNC: 7.4 G/DL (ref 6.4–8.4)
PROT UR STRIP-MCNC: NEGATIVE MG/DL
PROTHROMBIN TIME: 14.3 SECONDS (ref 11.6–14.5)
QRS AXIS: 68 DEGREES
QRS AXIS: 83 DEGREES
QRSD INTERVAL: 70 MS
QRSD INTERVAL: 72 MS
QT INTERVAL: 218 MS
QT INTERVAL: 326 MS
QTC INTERVAL: 365 MS
QTC INTERVAL: 378 MS
RBC # BLD AUTO: 4.79 MILLION/UL (ref 3.88–5.62)
RSV RNA RESP QL NAA+PROBE: NEGATIVE
S PNEUM AG UR QL: NEGATIVE
SARS-COV-2 RNA RESP QL NAA+PROBE: NEGATIVE
SODIUM SERPL-SCNC: 138 MMOL/L (ref 135–147)
SP GR UR STRIP.AUTO: 1.01 (ref 1–1.03)
T WAVE AXIS: -82 DEGREES
T WAVE AXIS: 55 DEGREES
UROBILINOGEN UR STRIP-ACNC: <2 MG/DL
VENTRICULAR RATE: 169 BPM
VENTRICULAR RATE: 81 BPM
WBC # BLD AUTO: 14.44 THOUSAND/UL (ref 4.31–10.16)

## 2024-01-25 PROCEDURE — 94760 N-INVAS EAR/PLS OXIMETRY 1: CPT

## 2024-01-25 PROCEDURE — 99285 EMERGENCY DEPT VISIT HI MDM: CPT

## 2024-01-25 PROCEDURE — 85730 THROMBOPLASTIN TIME PARTIAL: CPT

## 2024-01-25 PROCEDURE — 96366 THER/PROPH/DIAG IV INF ADDON: CPT

## 2024-01-25 PROCEDURE — 81003 URINALYSIS AUTO W/O SCOPE: CPT

## 2024-01-25 PROCEDURE — 87449 NOS EACH ORGANISM AG IA: CPT

## 2024-01-25 PROCEDURE — NC001 PR NO CHARGE: Performed by: INTERNAL MEDICINE

## 2024-01-25 PROCEDURE — 87081 CULTURE SCREEN ONLY: CPT | Performed by: NURSE PRACTITIONER

## 2024-01-25 PROCEDURE — 96375 TX/PRO/DX INJ NEW DRUG ADDON: CPT

## 2024-01-25 PROCEDURE — 71045 X-RAY EXAM CHEST 1 VIEW: CPT

## 2024-01-25 PROCEDURE — 82805 BLOOD GASES W/O2 SATURATION: CPT

## 2024-01-25 PROCEDURE — 83880 ASSAY OF NATRIURETIC PEPTIDE: CPT

## 2024-01-25 PROCEDURE — 1123F ACP DISCUSS/DSCN MKR DOCD: CPT | Performed by: EMERGENCY MEDICINE

## 2024-01-25 PROCEDURE — 94002 VENT MGMT INPAT INIT DAY: CPT

## 2024-01-25 PROCEDURE — 83605 ASSAY OF LACTIC ACID: CPT

## 2024-01-25 PROCEDURE — 87040 BLOOD CULTURE FOR BACTERIA: CPT

## 2024-01-25 PROCEDURE — 80053 COMPREHEN METABOLIC PANEL: CPT

## 2024-01-25 PROCEDURE — 99291 CRITICAL CARE FIRST HOUR: CPT | Performed by: EMERGENCY MEDICINE

## 2024-01-25 PROCEDURE — 84145 PROCALCITONIN (PCT): CPT | Performed by: NURSE PRACTITIONER

## 2024-01-25 PROCEDURE — 85379 FIBRIN DEGRADATION QUANT: CPT

## 2024-01-25 PROCEDURE — 36415 COLL VENOUS BLD VENIPUNCTURE: CPT

## 2024-01-25 PROCEDURE — 94640 AIRWAY INHALATION TREATMENT: CPT

## 2024-01-25 PROCEDURE — 96365 THER/PROPH/DIAG IV INF INIT: CPT

## 2024-01-25 PROCEDURE — 85610 PROTHROMBIN TIME: CPT

## 2024-01-25 PROCEDURE — 85025 COMPLETE CBC W/AUTO DIFF WBC: CPT

## 2024-01-25 PROCEDURE — 83690 ASSAY OF LIPASE: CPT

## 2024-01-25 PROCEDURE — 0241U HB NFCT DS VIR RESP RNA 4 TRGT: CPT

## 2024-01-25 PROCEDURE — 96368 THER/DIAG CONCURRENT INF: CPT

## 2024-01-25 PROCEDURE — 96376 TX/PRO/DX INJ SAME DRUG ADON: CPT

## 2024-01-25 PROCEDURE — 84484 ASSAY OF TROPONIN QUANT: CPT

## 2024-01-25 PROCEDURE — 83735 ASSAY OF MAGNESIUM: CPT

## 2024-01-25 PROCEDURE — 93005 ELECTROCARDIOGRAM TRACING: CPT

## 2024-01-25 RX ORDER — ACETAMINOPHEN 10 MG/ML
1000 INJECTION, SOLUTION INTRAVENOUS ONCE
Status: COMPLETED | OUTPATIENT
Start: 2024-01-25 | End: 2024-01-25

## 2024-01-25 RX ORDER — IPRATROPIUM BROMIDE AND ALBUTEROL SULFATE 2.5; .5 MG/3ML; MG/3ML
3 SOLUTION RESPIRATORY (INHALATION) ONCE
Status: COMPLETED | OUTPATIENT
Start: 2024-01-25 | End: 2024-01-25

## 2024-01-25 RX ORDER — MAGNESIUM SULFATE HEPTAHYDRATE 40 MG/ML
2 INJECTION, SOLUTION INTRAVENOUS ONCE
Status: COMPLETED | OUTPATIENT
Start: 2024-01-25 | End: 2024-01-26

## 2024-01-25 RX ORDER — MAGNESIUM SULFATE HEPTAHYDRATE 40 MG/ML
2 INJECTION, SOLUTION INTRAVENOUS ONCE
Status: COMPLETED | OUTPATIENT
Start: 2024-01-25 | End: 2024-01-25

## 2024-01-25 RX ORDER — FUROSEMIDE 10 MG/ML
20 INJECTION INTRAMUSCULAR; INTRAVENOUS ONCE
Status: COMPLETED | OUTPATIENT
Start: 2024-01-25 | End: 2024-01-25

## 2024-01-25 RX ORDER — DILTIAZEM HYDROCHLORIDE 5 MG/ML
20 INJECTION INTRAVENOUS ONCE
Status: COMPLETED | OUTPATIENT
Start: 2024-01-25 | End: 2024-01-25

## 2024-01-25 RX ORDER — CHLORHEXIDINE GLUCONATE ORAL RINSE 1.2 MG/ML
15 SOLUTION DENTAL EVERY 12 HOURS SCHEDULED
Status: DISCONTINUED | OUTPATIENT
Start: 2024-01-25 | End: 2024-02-01 | Stop reason: HOSPADM

## 2024-01-25 RX ADMIN — AZITHROMYCIN MONOHYDRATE 500 MG: 500 INJECTION, POWDER, LYOPHILIZED, FOR SOLUTION INTRAVENOUS at 20:23

## 2024-01-25 RX ADMIN — MAGNESIUM SULFATE HEPTAHYDRATE 2 G: 40 INJECTION, SOLUTION INTRAVENOUS at 23:59

## 2024-01-25 RX ADMIN — MAGNESIUM SULFATE HEPTAHYDRATE 2 G: 40 INJECTION, SOLUTION INTRAVENOUS at 20:14

## 2024-01-25 RX ADMIN — DILTIAZEM HYDROCHLORIDE 20 MG: 5 INJECTION INTRAVENOUS at 18:48

## 2024-01-25 RX ADMIN — ACETAMINOPHEN 1000 MG: 10 INJECTION INTRAVENOUS at 21:09

## 2024-01-25 RX ADMIN — FUROSEMIDE 20 MG: 10 INJECTION, SOLUTION INTRAMUSCULAR; INTRAVENOUS at 20:15

## 2024-01-25 RX ADMIN — CEFTRIAXONE SODIUM 1000 MG: 10 INJECTION, POWDER, FOR SOLUTION INTRAVENOUS at 19:55

## 2024-01-25 RX ADMIN — DILTIAZEM HYDROCHLORIDE 5 MG/HR: 5 INJECTION INTRAVENOUS at 19:03

## 2024-01-25 RX ADMIN — IPRATROPIUM BROMIDE AND ALBUTEROL SULFATE 3 ML: .5; 3 SOLUTION RESPIRATORY (INHALATION) at 20:09

## 2024-01-25 NOTE — ED NOTES
Dr. Carroll and NAREN Roth at bedside.     Rosibel Conner RN  01/25/24 1836       Rosibel Conner RN  01/25/24 1838

## 2024-01-26 ENCOUNTER — APPOINTMENT (INPATIENT)
Dept: NON INVASIVE DIAGNOSTICS | Facility: HOSPITAL | Age: 89
DRG: 871 | End: 2024-01-26
Payer: COMMERCIAL

## 2024-01-26 ENCOUNTER — APPOINTMENT (INPATIENT)
Dept: CT IMAGING | Facility: HOSPITAL | Age: 89
DRG: 871 | End: 2024-01-26
Payer: COMMERCIAL

## 2024-01-26 LAB
ALBUMIN SERPL BCP-MCNC: 3.2 G/DL (ref 3.5–5)
ALP SERPL-CCNC: 46 U/L (ref 34–104)
ALT SERPL W P-5'-P-CCNC: 9 U/L (ref 7–52)
ANION GAP SERPL CALCULATED.3IONS-SCNC: 9 MMOL/L
AORTIC ROOT: 3.4 CM
APICAL FOUR CHAMBER EJECTION FRACTION: 56 %
ASCENDING AORTA: 3.3 CM
AST SERPL W P-5'-P-CCNC: 15 U/L (ref 13–39)
AV REGURGITATION PRESSURE HALF TIME: 472 MS
BASOPHILS # BLD AUTO: 0.07 THOUSANDS/ÂΜL (ref 0–0.1)
BASOPHILS NFR BLD AUTO: 0 % (ref 0–1)
BILIRUB SERPL-MCNC: 1.23 MG/DL (ref 0.2–1)
BNP SERPL-MCNC: 229 PG/ML (ref 0–100)
BSA FOR ECHO PROCEDURE: 1.93 M2
BUN SERPL-MCNC: 37 MG/DL (ref 5–25)
CA-I BLD-SCNC: 1.09 MMOL/L (ref 1.12–1.32)
CALCIUM ALBUM COR SERPL-MCNC: 9.1 MG/DL (ref 8.3–10.1)
CALCIUM SERPL-MCNC: 8.5 MG/DL (ref 8.4–10.2)
CHLORIDE SERPL-SCNC: 108 MMOL/L (ref 96–108)
CO2 SERPL-SCNC: 22 MMOL/L (ref 21–32)
CREAT SERPL-MCNC: 1.41 MG/DL (ref 0.6–1.3)
EOSINOPHIL # BLD AUTO: 0 THOUSAND/ÂΜL (ref 0–0.61)
EOSINOPHIL NFR BLD AUTO: 0 % (ref 0–6)
ERYTHROCYTE [DISTWIDTH] IN BLOOD BY AUTOMATED COUNT: 13.6 % (ref 11.6–15.1)
FRACTIONAL SHORTENING: 33 (ref 28–44)
GFR SERPL CREATININE-BSD FRML MDRD: 42 ML/MIN/1.73SQ M
GLUCOSE SERPL-MCNC: 114 MG/DL (ref 65–140)
HCT VFR BLD AUTO: 40.4 % (ref 36.5–49.3)
HGB BLD-MCNC: 13.2 G/DL (ref 12–17)
IMM GRANULOCYTES # BLD AUTO: 0.13 THOUSAND/UL (ref 0–0.2)
IMM GRANULOCYTES NFR BLD AUTO: 1 % (ref 0–2)
INTERVENTRICULAR SEPTUM IN DIASTOLE (PARASTERNAL SHORT AXIS VIEW): 1.2 CM
INTERVENTRICULAR SEPTUM: 1.2 CM (ref 0.6–1.1)
LAAS-AP2: 26.7 CM2
LAAS-AP4: 26.7 CM2
LEFT ATRIUM SIZE: 4.2 CM
LEFT ATRIUM VOLUME (MOD BIPLANE): 88 ML
LEFT ATRIUM VOLUME INDEX (MOD BIPLANE): 45.6 ML/M2
LEFT INTERNAL DIMENSION IN SYSTOLE: 3.1 CM (ref 2.1–4)
LEFT VENTRICULAR INTERNAL DIMENSION IN DIASTOLE: 4.6 CM (ref 3.5–6)
LEFT VENTRICULAR POSTERIOR WALL IN END DIASTOLE: 1.3 CM
LEFT VENTRICULAR STROKE VOLUME: 61 ML
LVSV (TEICH): 61 ML
LYMPHOCYTES # BLD AUTO: 1.02 THOUSANDS/ÂΜL (ref 0.6–4.47)
LYMPHOCYTES NFR BLD AUTO: 5 % (ref 14–44)
MAGNESIUM SERPL-MCNC: 3.2 MG/DL (ref 1.9–2.7)
MCH RBC QN AUTO: 33.8 PG (ref 26.8–34.3)
MCHC RBC AUTO-ENTMCNC: 32.7 G/DL (ref 31.4–37.4)
MCV RBC AUTO: 103 FL (ref 82–98)
MONOCYTES # BLD AUTO: 1.28 THOUSAND/ÂΜL (ref 0.17–1.22)
MONOCYTES NFR BLD AUTO: 7 % (ref 4–12)
MV E'TISSUE VEL-SEP: 11 CM/S
NEUTROPHILS # BLD AUTO: 16.59 THOUSANDS/ÂΜL (ref 1.85–7.62)
NEUTS SEG NFR BLD AUTO: 87 % (ref 43–75)
NRBC BLD AUTO-RTO: 0 /100 WBCS
PHOSPHATE SERPL-MCNC: 3.9 MG/DL (ref 2.3–4.1)
PLATELET # BLD AUTO: 95 THOUSANDS/UL (ref 149–390)
PLATELET BLD QL SMEAR: ABNORMAL
PMV BLD AUTO: 11 FL (ref 8.9–12.7)
POTASSIUM SERPL-SCNC: 4.9 MMOL/L (ref 3.5–5.3)
PROCALCITONIN SERPL-MCNC: 14.5 NG/ML
PROCALCITONIN SERPL-MCNC: 19.9 NG/ML
PROT SERPL-MCNC: 5.6 G/DL (ref 6.4–8.4)
RBC # BLD AUTO: 3.91 MILLION/UL (ref 3.88–5.62)
RBC MORPH BLD: NORMAL
RIGHT ATRIAL 2D VOLUME: 81 ML
RIGHT ATRIUM AREA SYSTOLE A4C: 26.8 CM2
RIGHT VENTRICLE ID DIMENSION: 4.6 CM
SL CV AV DECELERATION TIME RETROGRADE: 1628 MS
SL CV AV PEAK GRADIENT RETROGRADE: 33 MMHG
SL CV LEFT ATRIUM LENGTH A2C: 6.3 CM
SL CV LV EF: 55
SL CV PED ECHO LEFT VENTRICLE DIASTOLIC VOLUME (MOD BIPLANE) 2D: 99 ML
SL CV PED ECHO LEFT VENTRICLE SYSTOLIC VOLUME (MOD BIPLANE) 2D: 38 ML
SODIUM SERPL-SCNC: 139 MMOL/L (ref 135–147)
TR MAX PG: 22 MMHG
TR PEAK VELOCITY: 2.3 M/S
TRICUSPID ANNULAR PLANE SYSTOLIC EXCURSION: 1.5 CM
TRICUSPID VALVE PEAK REGURGITATION VELOCITY: 2.33 M/S
WBC # BLD AUTO: 19.09 THOUSAND/UL (ref 4.31–10.16)

## 2024-01-26 PROCEDURE — 94760 N-INVAS EAR/PLS OXIMETRY 1: CPT

## 2024-01-26 PROCEDURE — 84100 ASSAY OF PHOSPHORUS: CPT

## 2024-01-26 PROCEDURE — 99223 1ST HOSP IP/OBS HIGH 75: CPT | Performed by: INTERNAL MEDICINE

## 2024-01-26 PROCEDURE — 83880 ASSAY OF NATRIURETIC PEPTIDE: CPT | Performed by: NURSE PRACTITIONER

## 2024-01-26 PROCEDURE — 80053 COMPREHEN METABOLIC PANEL: CPT

## 2024-01-26 PROCEDURE — 83735 ASSAY OF MAGNESIUM: CPT

## 2024-01-26 PROCEDURE — 85025 COMPLETE CBC W/AUTO DIFF WBC: CPT

## 2024-01-26 PROCEDURE — 82330 ASSAY OF CALCIUM: CPT

## 2024-01-26 PROCEDURE — 93306 TTE W/DOPPLER COMPLETE: CPT | Performed by: INTERNAL MEDICINE

## 2024-01-26 PROCEDURE — 94640 AIRWAY INHALATION TREATMENT: CPT

## 2024-01-26 PROCEDURE — 84145 PROCALCITONIN (PCT): CPT | Performed by: NURSE PRACTITIONER

## 2024-01-26 PROCEDURE — G1004 CDSM NDSC: HCPCS

## 2024-01-26 PROCEDURE — 71250 CT THORAX DX C-: CPT

## 2024-01-26 PROCEDURE — 93306 TTE W/DOPPLER COMPLETE: CPT

## 2024-01-26 RX ORDER — SODIUM CHLORIDE, SODIUM GLUCONATE, SODIUM ACETATE, POTASSIUM CHLORIDE, MAGNESIUM CHLORIDE, SODIUM PHOSPHATE, DIBASIC, AND POTASSIUM PHOSPHATE .53; .5; .37; .037; .03; .012; .00082 G/100ML; G/100ML; G/100ML; G/100ML; G/100ML; G/100ML; G/100ML
1000 INJECTION, SOLUTION INTRAVENOUS ONCE
Status: COMPLETED | OUTPATIENT
Start: 2024-01-26 | End: 2024-01-26

## 2024-01-26 RX ORDER — CALCIUM GLUCONATE 20 MG/ML
1 INJECTION, SOLUTION INTRAVENOUS ONCE
Status: COMPLETED | OUTPATIENT
Start: 2024-01-26 | End: 2024-01-26

## 2024-01-26 RX ORDER — VANCOMYCIN HYDROCHLORIDE 750 MG/150ML
750 INJECTION, SOLUTION INTRAVENOUS EVERY 24 HOURS
Status: DISCONTINUED | OUTPATIENT
Start: 2024-01-27 | End: 2024-01-27 | Stop reason: DRUGHIGH

## 2024-01-26 RX ORDER — METOPROLOL TARTRATE 1 MG/ML
2.5 INJECTION, SOLUTION INTRAVENOUS EVERY 6 HOURS PRN
Status: DISCONTINUED | OUTPATIENT
Start: 2024-01-26 | End: 2024-02-01 | Stop reason: HOSPADM

## 2024-01-26 RX ORDER — IPRATROPIUM BROMIDE AND ALBUTEROL SULFATE 2.5; .5 MG/3ML; MG/3ML
3 SOLUTION RESPIRATORY (INHALATION)
Status: DISCONTINUED | OUTPATIENT
Start: 2024-01-26 | End: 2024-01-26

## 2024-01-26 RX ORDER — OLANZAPINE 2.5 MG/1
2.5 TABLET, FILM COATED ORAL EVERY 6 HOURS PRN
Status: DISCONTINUED | OUTPATIENT
Start: 2024-01-26 | End: 2024-02-01 | Stop reason: HOSPADM

## 2024-01-26 RX ORDER — LEVALBUTEROL INHALATION SOLUTION 1.25 MG/3ML
1.25 SOLUTION RESPIRATORY (INHALATION)
Status: DISCONTINUED | OUTPATIENT
Start: 2024-01-26 | End: 2024-01-27

## 2024-01-26 RX ADMIN — CHLORHEXIDINE GLUCONATE 15 ML: 1.2 SOLUTION ORAL at 09:28

## 2024-01-26 RX ADMIN — CALCIUM GLUCONATE 1 G: 20 INJECTION, SOLUTION INTRAVENOUS at 07:52

## 2024-01-26 RX ADMIN — OLANZAPINE 2.5 MG: 2.5 TABLET, FILM COATED ORAL at 19:23

## 2024-01-26 RX ADMIN — CHLORHEXIDINE GLUCONATE 15 ML: 1.2 SOLUTION ORAL at 00:02

## 2024-01-26 RX ADMIN — METOPROLOL TARTRATE 25 MG: 25 TABLET, FILM COATED ORAL at 11:47

## 2024-01-26 RX ADMIN — OLANZAPINE 2.5 MG: 2.5 TABLET, FILM COATED ORAL at 13:53

## 2024-01-26 RX ADMIN — APIXABAN 2.5 MG: 2.5 TABLET, FILM COATED ORAL at 09:28

## 2024-01-26 RX ADMIN — CEFEPIME 2000 MG: 2 INJECTION, POWDER, FOR SOLUTION INTRAVENOUS at 20:17

## 2024-01-26 RX ADMIN — VANCOMYCIN HYDROCHLORIDE 2000 MG: 500 INJECTION, POWDER, LYOPHILIZED, FOR SOLUTION INTRAVENOUS at 09:25

## 2024-01-26 RX ADMIN — APIXABAN 2.5 MG: 2.5 TABLET, FILM COATED ORAL at 16:44

## 2024-01-26 RX ADMIN — CEFEPIME 2000 MG: 2 INJECTION, POWDER, FOR SOLUTION INTRAVENOUS at 08:28

## 2024-01-26 RX ADMIN — METOPROLOL TARTRATE 25 MG: 25 TABLET, FILM COATED ORAL at 20:17

## 2024-01-26 RX ADMIN — METOPROLOL TARTRATE 2.5 MG: 1 INJECTION, SOLUTION INTRAVENOUS at 18:10

## 2024-01-26 RX ADMIN — SODIUM CHLORIDE, SODIUM GLUCONATE, SODIUM ACETATE, POTASSIUM CHLORIDE, MAGNESIUM CHLORIDE, SODIUM PHOSPHATE, DIBASIC, AND POTASSIUM PHOSPHATE 1000 ML: .53; .5; .37; .037; .03; .012; .00082 INJECTION, SOLUTION INTRAVENOUS at 02:45

## 2024-01-26 RX ADMIN — IPRATROPIUM BROMIDE 0.5 MG: 0.5 SOLUTION RESPIRATORY (INHALATION) at 20:39

## 2024-01-26 RX ADMIN — LEVALBUTEROL HYDROCHLORIDE 1.25 MG: 1.25 SOLUTION RESPIRATORY (INHALATION) at 20:39

## 2024-01-26 NOTE — PLAN OF CARE
Problem: SAFETY,RESTRAINT: NV/NON-SELF DESTRUCTIVE BEHAVIOR  Goal: Remains free of harm/injury (restraint for non violent/non self-detsructive behavior)  Description: INTERVENTIONS:  - Instruct patient/family regarding restraint use   - Assess and monitor physiologic and psychological status   - Provide interventions and comfort measures to meet assessed patient needs   - Identify and implement measures to help patient regain control  - Assess readiness for release of restraint   Outcome: Progressing  Goal: Returns to optimal restraint-free functioning  Description: INTERVENTIONS:  - Assess the patient's behavior and symptoms that indicate continued need for restraint  - Identify and implement measures to help patient regain control  - Assess readiness for release of restraint   Outcome: Progressing     Problem: Prexisting or High Potential for Compromised Skin Integrity  Goal: Skin integrity is maintained or improved  Description: INTERVENTIONS:  - Identify patients at risk for skin breakdown  - Assess and monitor skin integrit  - Assess and monitor nutrition and hydration status  - Monitor labs   - Assess for incontinence   - Turn and reposition patient  - Assist with mobility/ambulation  - Relieve pressure over bony prominences  - Avoid friction and shearing  - Provide appropriate hygiene as needed including keeping skin clean and dry  - Evaluate need for skin moisturizer/barrier cream  - Collaborate with interdisciplinary team   - Patient/family teaching  - Consider wound care consult   Outcome: Progressing

## 2024-01-26 NOTE — ED PROVIDER NOTES
History  Chief Complaint   Patient presents with    Shortness of Breath     Arrives via EMS from [facility]. Staff called for SOB and decreased oxygen levels. Oxygen saturation in the 80s on 4L NC, which is chronic. EMS started patient on 15L NRB. Patient newly agitated. Hx of afib, with rapid HR on the monitor in triage.     89-year-old male with past medical history of A-fib on Eliquis, hyperlipidemia, hypertension, Alzheimer's dementia who currently resides at [facility], presents for evaluation of sudden onset of shortness of breath about 1 hour PTA.  According to EMS, patient was called due to sudden onset of coughing and difficulty breathing, hypoxemia in the 80s on 4 L nasal cannula per report of nursing home staff.  Patient is at baseline somewhat verbal however he was also noted to be agitated which is abnormal from patient's baseline.  Patient denies chest pain or any other pain symptoms at this time however HPI is otherwise limited secondary to current clinical status.        Prior to Admission Medications   Prescriptions Last Dose Informant Patient Reported? Taking?   QUEtiapine (SEROquel) 25 mg tablet  Outside Facility (Specify) No No   Sig: Take 0.5 tablets (12.5 mg total) by mouth in the morning   Patient not taking: Reported on 8/17/2023   acetaminophen (TYLENOL) 325 mg tablet  Outside Facility (Specify) Yes No   Sig: Take 325 mg by mouth every 4 (four) hours as needed for mild pain 2 tablets   albuterol (2.5 mg/3 mL) 0.083 % nebulizer solution  Outside Facility (Specify) Yes No   aluminum-magnesium hydroxide-simethicone (MAALOX) 200-200-20 MG/5ML SUSP  Outside Facility (Specify) Yes No   Sig: Take 30 mL by mouth 4 (four) times a day (before meals and at bedtime)   apixaban (ELIQUIS) 5 mg  Outside Facility (Specify) No No   Sig: Take 1 tablet (5 mg total) by mouth 2 (two) times a day   cholecalciferol (VITAMIN D3) 1,000 units tablet  Outside Facility (Specify) No No   Sig: Take 2 tablets  (2,000 Units total) by mouth daily Do not start before April 12, 2023.   colchicine (COLCRYS) 0.6 mg tablet  Outside Facility (Specify) No No   Sig: Take 1 tablet (0.6 mg total) by mouth 2 (two) times a day for 5 doses   cyanocobalamin (VITAMIN B-12) 2000 MCG tablet  Outside Facility (Specify) No No   Sig: Take 1 tablet (2,000 mcg total) by mouth daily Do not start before April 12, 2023.   ipratropium-albuterol (DUO-NEB) 0.5-2.5 mg/3 mL nebulizer solution  Outside Facility (Specify) Yes No   Patient not taking: Reported on 8/17/2023   loperamide (Imodium A-D) 2 MG tablet  Outside Facility (Specify) Yes No   Sig: Take 2 mg by mouth 4 (four) times a day as needed for diarrhea   metoprolol tartrate (LOPRESSOR) 25 mg tablet  Outside Facility (Specify) No No   Sig: Take 1 tablet (25 mg total) by mouth every 12 (twelve) hours      Facility-Administered Medications: None       Past Medical History:   Diagnosis Date    Anxiety     Atrial fibrillation (HCC)     Dementia (HCC)     Disorder of kidney and ureter     Dupuytren's disease     Fibromatosis     Hyperlipidemia     Hypertension     PAF (paroxysmal atrial fibrillation) (HCC)     Thrombocytopenia (HCC)     Tinea unguium     Vascular disease        No past surgical history on file.    Family History   Problem Relation Age of Onset    No Known Problems Mother     No Known Problems Father      I have reviewed and agree with the history as documented.    E-Cigarette/Vaping    E-Cigarette Use Never User      E-Cigarette/Vaping Substances    Nicotine No     THC No     CBD No     Flavoring No     Other No     Unknown No      Social History     Tobacco Use    Smoking status: Never     Passive exposure: Never    Smokeless tobacco: Never   Vaping Use    Vaping status: Never Used        Review of Systems   Unable to perform ROS: Severe respiratory distress       Physical Exam  ED Triage Vitals   Temperature Pulse Respirations Blood Pressure SpO2   01/25/24 1832 01/25/24 1832  01/25/24 1832 01/25/24 1835 01/25/24 1832   99.4 °F (37.4 °C) (!) 171 (!) 24 (!) 195/90 93 %      Temp Source Heart Rate Source Patient Position - Orthostatic VS BP Location FiO2 (%)   01/25/24 1832 01/25/24 1832 01/25/24 1832 01/25/24 1832 --   Oral Monitor Sitting Right arm       Pain Score       --                    Orthostatic Vital Signs  Vitals:    01/25/24 2130 01/25/24 2144 01/25/24 2200 01/25/24 2300   BP: 95/52 102/51 100/50 103/57   Pulse: 96 91 100 79   Patient Position - Orthostatic VS:           Physical Exam  Vitals and nursing note reviewed.   Constitutional:       General: He is in acute distress.      Appearance: He is ill-appearing.   HENT:      Head: Normocephalic and atraumatic.      Mouth/Throat:      Mouth: Mucous membranes are moist.   Eyes:      Extraocular Movements: Extraocular movements intact.      Conjunctiva/sclera: Conjunctivae normal.   Cardiovascular:      Rate and Rhythm: Regular rhythm. Tachycardia present.      Heart sounds: No murmur heard.  Pulmonary:      Effort: Pulmonary effort is normal. No respiratory distress.      Breath sounds: Examination of the right-upper field reveals wheezing. Examination of the left-upper field reveals wheezing. Examination of the right-middle field reveals wheezing. Examination of the left-middle field reveals wheezing. Examination of the right-lower field reveals wheezing. Examination of the left-lower field reveals wheezing. Wheezing present.      Comments: Scant expiratory wheeze throughout anterior lung fields.  No crackles/rales or rhonchi.  Chest:      Chest wall: No tenderness.   Abdominal:      Palpations: Abdomen is soft.      Tenderness: There is no abdominal tenderness.   Musculoskeletal:         General: No swelling.      Cervical back: Normal range of motion and neck supple.      Right lower leg: No tenderness. Edema present.      Left lower leg: No tenderness. Edema present.      Comments: Very minimal nonpitting edema bilateral  lower extremities.  Equal.   Skin:     General: Skin is warm and dry.      Capillary Refill: Capillary refill takes less than 2 seconds.   Neurological:      Mental Status: He is alert.      Comments: Orientation questions difficult to ascertain secondary to current clinical status.   Psychiatric:         Mood and Affect: Mood normal.         Behavior: Behavior normal.         ED Medications  Medications   diltiazem (CARDIZEM) 125 mg in sodium chloride 0.9 % 125 mL infusion (5 mg/hr Intravenous New Bag 1/25/24 1903)   apixaban (ELIQUIS) tablet 2.5 mg (has no administration in time range)   metoprolol tartrate (LOPRESSOR) tablet 25 mg (25 mg Oral Not Given 1/26/24 0003)   chlorhexidine (PERIDEX) 0.12 % oral rinse 15 mL (15 mL Mouth/Throat Given 1/26/24 0002)   magnesium sulfate 2 g/50 mL IVPB (premix) 2 g (2 g Intravenous New Bag 1/25/24 2359)   ceftriaxone (ROCEPHIN) 1 g/50 mL in dextrose IVPB (has no administration in time range)   diltiazem (CARDIZEM) injection 20 mg (20 mg Intravenous Given 1/25/24 1848)   ceftriaxone (ROCEPHIN) 1 g/50 mL in dextrose IVPB (0 mg Intravenous Stopped 1/25/24 2024)   azithromycin (ZITHROMAX) 500 mg in sodium chloride 0.9% 250mL IVPB 500 mg (0 mg Intravenous Stopped 1/25/24 2130)   ipratropium-albuterol (DUO-NEB) 0.5-2.5 mg/3 mL inhalation solution 3 mL (3 mL Nebulization Given 1/25/24 2009)   magnesium sulfate 2 g/50 mL IVPB (premix) 2 g (0 g Intravenous Stopped 1/25/24 2049)   furosemide (LASIX) injection 20 mg (20 mg Intravenous Given 1/25/24 2015)   acetaminophen (Ofirmev) injection 1,000 mg (0 mg Intravenous Stopped 1/25/24 2125)       Diagnostic Studies  Results Reviewed       Procedure Component Value Units Date/Time    Strep Pneumoniae, Urine [[id number]]  (Normal) Collected: 01/25/24 1954    Lab Status: Final result Specimen: Urine, Other Updated: 01/25/24 2348     Strep pneumoniae antigen, urine Negative    Legionella antigen, urine [[id number]]  (Normal) Collected: 01/25/24  2047    Lab Status: Final result Specimen: Urine, Catheter Updated: 01/25/24 2348     Legionella Urinary Antigen Negative    HS Troponin I 4hr [[id number]]  (Normal) Collected: 01/25/24 2251    Lab Status: Final result Specimen: Blood from Arm, Right Updated: 01/25/24 2325     hs TnI 4hr 17 ng/L      Delta 4hr hsTnI 6 ng/L     HS Troponin I 2hr [[id number]]  (Normal) Collected: 01/25/24 2047    Lab Status: Final result Specimen: Blood from Arm, Right Updated: 01/25/24 2127     hs TnI 2hr 13 ng/L      Delta 2hr hsTnI 2 ng/L     Lactic acid 2 Hours [[id number]]  (Normal) Collected: 01/25/24 2047    Lab Status: Final result Specimen: Blood from Arm, Right Updated: 01/25/24 2120     LACTIC ACID 1.3 mmol/L     Narrative:      Result may be elevated if tourniquet was used during collection.    Blood gas, venous [[id number]]  (Abnormal) Collected: 01/25/24 2047    Lab Status: Final result Specimen: Blood from Arm, Right Updated: 01/25/24 2058     pH, Marcial 7.415     pCO2, Marcial 34.5 mm Hg      pO2, Marcial 51.6 mm Hg      HCO3, Marcial 21.6 mmol/L      Base Excess, Marcial -2.1 mmol/L      O2 Content, Marcial 18.0 ml/dL      O2 HGB, VENOUS 83.0 %     UA w Reflex to Microscopic w Reflex to Culture [[id number]] Collected: 01/25/24 1959    Lab Status: Final result Specimen: Urine Updated: 01/25/24 2049     Color, UA Light Yellow     Clarity, UA Clear     Specific Gravity, UA 1.014     pH, UA 5.5     Leukocytes, UA Negative     Nitrite, UA Negative     Protein, UA Negative mg/dl      Glucose, UA Negative mg/dl      Ketones, UA Negative mg/dl      Urobilinogen, UA <2.0 mg/dl      Bilirubin, UA Negative     Occult Blood, UA Negative    FLU/RSV/COVID - if FLU/RSV clinically relevant [[id number]]  (Normal) Collected: 01/25/24 1845    Lab Status: Final result Specimen: Nares from Nose Updated: 01/25/24 1943     SARS-CoV-2 Negative     INFLUENZA A PCR Negative     INFLUENZA B PCR Negative     RSV PCR Negative    Narrative:      FOR PEDIATRIC PATIENTS -  copy/paste COVID Guidelines URL to browser: [email or web address]    SARS-CoV-2 assay is a Nucleic Acid Amplification assay intended for the  qualitative detection of nucleic acid from SARS-CoV-2 in nasopharyngeal  swabs. Results are for the presumptive identification of SARS-CoV-2 RNA.    Positive results are indicative of infection with SARS-CoV-2, the virus  causing COVID-19, but do not rule out bacterial infection or co-infection  with other viruses. Laboratories within the [place] and its  territories are required to report all positive results to the appropriate  public health authorities. Negative results do not preclude SARS-CoV-2  infection and should not be used as the sole basis for treatment or other  patient management decisions. Negative results must be combined with  clinical observations, patient history, and epidemiological information.  This test has not been FDA cleared or approved.    This test has been authorized by FDA under an Emergency Use Authorization  (EUA). This test is only authorized for the duration of time the  declaration that circumstances exist justifying the authorization of the  emergency use of an in vitro diagnostic tests for detection of SARS-CoV-2  virus and/or diagnosis of COVID-19 infection under section 564(b)(1) of  the Act, 21 U.S.C. 360bbb-3(b)(1), unless the authorization is terminated  or revoked sooner. The test has been validated but independent review by FDA  and CLIA is pending.    Test performed using [employer] GeneXpert: This RT-PCR assay targets N2,  a region unique to SARS-CoV-2. A conserved region in the E-gene was chosen  for pan-Sarbecovirus detection which includes SARS-CoV-2.    According to CMS-2020-01-R, this platform meets the definition of high-throughput technology.    CBC and differential [[id number]]  (Abnormal) Collected: 01/25/24 5070    Lab Status: Final result Specimen: Blood from Arm,  Left Updated: 01/25/24 1932     WBC 14.44 Thousand/uL      RBC 4.79 Million/uL      Hemoglobin 16.1 g/dL      Hematocrit 49.4 %       fL      MCH 33.6 pg      MCHC 32.6 g/dL      RDW 13.5 %      MPV 10.3 fL      Platelets 145 Thousands/uL      nRBC 0 /100 WBCs      Neutrophils Relative 74 %      Immat GRANS % 1 %      Lymphocytes Relative 17 %      Monocytes Relative 8 %      Eosinophils Relative 0 %      Basophils Relative 0 %      Neutrophils Absolute 10.62 Thousands/µL      Immature Grans Absolute 0.07 Thousand/uL      Lymphocytes Absolute 2.51 Thousands/µL      Monocytes Absolute 1.14 Thousand/µL      Eosinophils Absolute 0.05 Thousand/µL      Basophils Absolute 0.05 Thousands/µL     HS Troponin 0hr (reflex protocol) [[id number]]  (Normal) Collected: 01/25/24 1844    Lab Status: Final result Specimen: Blood from Arm, Left Updated: 01/25/24 1921     hs TnI 0hr 11 ng/L     B-Type Natriuretic Peptide(BNP) [[id number]]  (Abnormal) Collected: 01/25/24 1844    Lab Status: Final result Specimen: Blood from Arm, Left Updated: 01/25/24 1919      pg/mL     D-Dimer [[id number]]  (Abnormal) Collected: 01/25/24 1844    Lab Status: Final result Specimen: Blood from Arm, Left Updated: 01/25/24 1918     D-Dimer, Quant 0.88 ug/ml FEU     Narrative:      In the evaluation for possible pulmonary embolism, in the appropriate (Well's Score of 4 or less) patient, the age adjusted d-dimer cutoff for this patient can be calculated as:    Age x 0.01 (in ug/mL) for Age-adjusted D-dimer exclusion threshold for a patient over 50 years.    Lactic acid, plasma (w/reflex if result > 2.0) [[id number]]  (Abnormal) Collected: 01/25/24 1844    Lab Status: Final result Specimen: Blood from Arm, Left Updated: 01/25/24 1917     LACTIC ACID 3.5 mmol/L     Narrative:      Result may be elevated if tourniquet was used during collection.    Comprehensive metabolic panel [[id number]]  (Abnormal) Collected: 01/25/24 1844    Lab Status: Final  result Specimen: Blood from Arm, Left Updated: 01/25/24 1915     Sodium 138 mmol/L      Potassium 4.7 mmol/L      Chloride 105 mmol/L      CO2 24 mmol/L      ANION GAP 9 mmol/L      BUN 36 mg/dL      Creatinine 1.37 mg/dL      Glucose 117 mg/dL      Calcium 9.5 mg/dL      AST 21 U/L      ALT 13 U/L      Alkaline Phosphatase 70 U/L      Total Protein 7.4 g/dL      Albumin 4.3 g/dL      Total Bilirubin 0.82 mg/dL      eGFR 44 ml/min/1.73sq m     Narrative:      National Kidney Disease Foundation guidelines for Chronic Kidney Disease (CKD):     Stage 1 with normal or high GFR (GFR > 90 mL/min/1.73 square meters)    Stage 2 Mild CKD (GFR = 60-89 mL/min/1.73 square meters)    Stage 3A Moderate CKD (GFR = 45-59 mL/min/1.73 square meters)    Stage 3B Moderate CKD (GFR = 30-44 mL/min/1.73 square meters)    Stage 4 Severe CKD (GFR = 15-29 mL/min/1.73 square meters)    Stage 5 End Stage CKD (GFR <15 mL/min/1.73 square meters)  Note: GFR calculation is accurate only with a steady state creatinine    Lipase [[id number]]  (Normal) Collected: 01/25/24 1844    Lab Status: Final result Specimen: Blood from Arm, Left Updated: 01/25/24 1915     Lipase 36 u/L     Magnesium [[id number]]  (Abnormal) Collected: 01/25/24 1844    Lab Status: Final result Specimen: Blood from Arm, Left Updated: 01/25/24 1915     Magnesium 1.8 mg/dL     Protime-INR [[id number]]  (Normal) Collected: 01/25/24 1844    Lab Status: Final result Specimen: Blood from Arm, Left Updated: 01/25/24 1911     Protime 14.3 seconds      INR 1.05    APTT [[id number]]  (Normal) Collected: 01/25/24 1844    Lab Status: Final result Specimen: Blood from Arm, Left Updated: 01/25/24 1911     PTT 28 seconds     Blood gas, venous [[id number]]  (Abnormal) Collected: 01/25/24 1845    Lab Status: Final result Specimen: Blood from Arm, Right Updated: 01/25/24 1855     pH, Marcial 7.353     pCO2, Marcial 38.6 mm Hg      pO2, Marcial 40.3 mm Hg      HCO3, Marcial 21.0 mmol/L      Base Excess, Marcial -4.0 mmol/L       O2 Content, Marcial 17.1 ml/dL      O2 HGB, VENOUS 69.6 %     Blood culture #1 [[id number]] Collected: 01/25/24 1844    Lab Status: In process Specimen: Blood from Line, Venous Updated: 01/25/24 1850    Blood culture #2 [[id number]] Collected: 01/25/24 1842    Lab Status: In process Specimen: Blood from Line, Venous Updated: 01/25/24 1849                   XR chest 1 view portable    (Results Pending)         Procedures  ECG 12 Lead Documentation Only    Date/Time: 1/25/2024 6:33 PM    Performed by: Rebecca Roth MD  Authorized by: Rebecca Roth MD    Indications / Diagnosis:  Shortness of breath, history distress  ECG reviewed by me, the ED Provider: yes    Patient location:  ED  Previous ECG:     Previous ECG:  Unavailable  Interpretation:     Interpretation: abnormal    Rate:     ECG rate:  169    ECG rate assessment: tachycardic    Rhythm:     Rhythm: atrial fibrillation    Ectopy:     Ectopy: none    QRS:     QRS axis:  Normal    QRS intervals:  Normal  Conduction:     Conduction: abnormal    ST segments:     ST segments:  Non-specific  T waves:     T waves: non-specific          ED Course  ED Course as of 01/26/24 0034   Thu Jan 25, 2024   1920 D-Dimer, Quant(!): 0.88  Below age cut off   1920 LACTIC ACID(!!): 3.5                          Initial Sepsis Screening       Row Name 01/25/24 1930                Is the patient's history suggestive of a new or worsening infection? Yes (Proceed)  -MS        Suspected source of infection pneumonia  -MS        Indicate SIRS criteria Tachycardia > 90 bpm;Tachypnea > 20 resp per min;Leukocytosis (WBC > 84959 IJL) OR Leukopenia (WBC <4000 IJL) OR Bandemia (WBC >10% bands)  -MS        Are two or more of the above signs & symptoms of infection both present and new to the patient? Yes (Proceed)  -MS        Assess for evidence of organ dysfunction: Are any of the below criteria present within 6 hours of suspected infection and SIRS criteria that are NOT considered to be chronic  "conditions? New need for invasive/non-invasive ventilation;Lactate > 2.0  -MS        Date of presentation of severe sepsis 01/25/24  -MS        Time of presentation of severe sepsis 1930  -MS        Sepsis Note: Click \"NEXT\" below (NOT \"close\") to generate sepsis note based on above information. YES (proceed by clicking \"NEXT\")  -MS                  User Key  (r) = Recorded By, (t) = Taken By, (c) = Cosigned By      Initials Name Provider Type    MS Rebecca MD Gonzalez Resident                              Medical Decision Making  Upon presentation, patient appeared to be in acute respiratory distress, tachypneic in the 40s to 50s breaths per minute, using discussed 3 muscles and unable to medicate verbally.  At that time, the decision was made to place patient on BiPAP for further respiratory support.  No evidence of significant hypercarbia however on VBG patient was noted to have respiratory alkalosis with a component of metabolic acidosis.  Patient was very warm to touch and was found to be febrile per rectal temperature at 101.1 measured eventually.  Full septic workup revealed evidence of severe sepsis likely secondary to multifocal pneumonia per chest x-ray.  Patient was provided cefepime, azithromycin, x 1 DuoNeb, magnesium sulfate as well as Lasix for acute respiratory distress likely secondary to pneumonia with elevation of BNP.  A-fib with RVR was treated with bolus and gtt. diltiazem to which patient had good response and remained rate controlled thereafter.  No evidence of acute ACS or acute ischemic changes.  Patient admitted to the critical care service for further evaluation and management. Pt understands and agreed with plan. All questions answered. No other concerns.        Amount and/or Complexity of Data Reviewed  Labs: ordered. Decision-making details documented in ED Course.  Radiology: ordered.    Risk  Prescription drug management.  Decision regarding hospitalization.          Disposition  Final " diagnoses:   Severe sepsis (HCC)   Pneumonia   Hypoxia   Dyspnea     Time reflects when diagnosis was documented in both MDM as applicable and the Disposition within this note       Time User Action Codes Description Comment    1/25/2024  9:14 PM Rebecca Roth Add [A41.9,  R65.20] Severe sepsis (HCC)     1/25/2024  9:14 PM Rebecca Roth Add [J18.9] Pneumonia     1/25/2024  9:14 PM Rebecca Roth Add [R09.02] Hypoxia     1/25/2024  9:14 PM Rebecca Roth Add [R06.00] Dyspnea     1/25/2024 11:11 PM Hali Erica Add [I48.91] Atrial fibrillation (HCC)           ED Disposition       ED Disposition   Admit    Condition   Stable    Date/Time   u Jan 25, 2024  9:14 PM    Comment   Case was discussed with Dr. Remigio STOCK and the patient's admission status was agreed to be Admission Status: inpatient status to the service of Dr. Flood .               Follow-up Information    None         Current Discharge Medication List        CONTINUE these medications which have NOT CHANGED    Details   acetaminophen (TYLENOL) 325 mg tablet Take 325 mg by mouth every 4 (four) hours as needed for mild pain 2 tablets      albuterol (2.5 mg/3 mL) 0.083 % nebulizer solution       aluminum-magnesium hydroxide-simethicone (MAALOX) 200-200-20 MG/5ML SUSP Take 30 mL by mouth 4 (four) times a day (before meals and at bedtime)      apixaban (ELIQUIS) 5 mg Take 1 tablet (5 mg total) by mouth 2 (two) times a day  Refills: 0    Associated Diagnoses: PAF (paroxysmal atrial fibrillation) ([facility])      cholecalciferol (VITAMIN D3) 1,000 units tablet Take 2 tablets (2,000 Units total) by mouth daily Do not start before April 12, 2023.  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)      ipratropium-albuterol (DUO-NEB) 0.5-2.5 mg/3 mL nebulizer solution       loperamide (Imodium A-D) 2 MG tablet Take 2 mg by mouth 4 (four) times a day as needed for diarrhea      metoprolol tartrate (LOPRESSOR) 25 mg tablet Take 1 tablet (25 mg total) by mouth every 12 (twelve) hours  Qty: 60 tablet, Refills: 2    Associated Diagnoses: Chronic atrial fibrillation with rapid ventricular response (HCC)      QUEtiapine (SEROquel) 25 mg tablet Take 0.5 tablets (12.5 mg total) by mouth in the morning  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    PDMP Review         Value Time User    PDMP Reviewed  Yes 1/25/2024 11:10 PM BEBO Hernandez             ED Provider  Attending physically available and evaluated Satish Novak. I managed the patient along with the ED Attending.    Electronically Signed by           Rebecca Roth MD  01/26/24 0034

## 2024-01-26 NOTE — PROGRESS NOTES
Outpatient Speech Language Pathology   Peds Swallow Treatment Note  [facility]     Patient Name: Triston Esparza  : 2020  MRN: [id number]  Today's Date: 2020         Visit Date: 2020    There is no problem list on file for this patient.      Visit Dx:    ICD-10-CM ICD-9-CM   1. Feeding difficulties  R63.3 783.3   2. Hypoxic ischemic encephalopathy, unspecified severity  P91.60 768.70                         OP SLP Assessment/Plan - 20 1400        SLP Assessment    Functional Problems  Swallowing   -LA    Impact on Function: Swallowing  Risk of malnourishment;Risk of dehydration;Risk of aspiration;Impact on social aspects of eating;Risk of pneumonia   -LA    Clinical Impression: Swallowing  Profound:;dysphagia unspecified   -LA    Functional Problems Comment  Dependence on g-tube for all nutrition, hydration, and medication administration; poor PO readiness, delayed feeding milestones   -LA    Clinical Impression Comments  Triston was held by mom and participated in oral range of motion, strengthening, and stimulation with ease. Pt offered minuscule tastes of stage 1 pureed apples.  Pt did not exhibit s/sx of aspiration during trials, however only very small amounts were used.    -LA    Prognosis  Good (comment)   -LA    Patient/caregiver participated in establishment of treatment plan and goals  Yes   -LA    Patient would benefit from skilled therapy intervention  Yes   -LA       SLP Plan    Frequency  1x week   -LA    Duration  6-12 months   -LA    Planned CPT's?  SLP SWALLOW THERAPY: 96975   -LA    Plan Comments  Next session to address oral stimulation and promotion of oral feeding.   -LA      User Key  (r) = Recorded By, (t) = Taken By, (c) = Cosigned By    Initials Name Provider Type    Letty Sanchez MS CCC-SLP Speech and Language Pathologist        SLP OP Goals     Row Name 20 1400          Goal Type Needed    Goal Type Needed  Dysphagia  -LA        Subjective  Satish Novak is a 89 y.o. male who is currently ordered Vancomycin IV with management by the Pharmacy Consult service.  Relevant clinical data and objective / subjective history reviewed.  Vancomycin Assessment:  Indication and Goal AUC/Trough: Pneumonia (goal -600, trough >10)  Clinical Status:  new  Micro:     Renal Function:  SCr: 1.41 mg/dL  CrCl: 34 mL/min  Renal replacement: Not on dialysis  Days of Therapy: 1  Current Dose: 2000 mg IV loading dose given  Vancomycin Plan:  New Dosin mg IV every 24 hours  Estimated AUC: 442 mcg*hr/mL  Estimated Trough: 14.6 mcg/mL  Next Level:  @ 0600  Renal Function Monitoring: Daily BMP and UOP  Pharmacy will continue to follow closely for s/sx of nephrotoxicity, infusion reactions and appropriateness of therapy.  BMP and CBC will be ordered per protocol. We will continue to follow the patient’s culture results and clinical progress daily.    Danisha Kenney, Pharmacist   Comments    Subjective Comments  Mother reports pt will have a video swallow at [facility] on Monday.    -LA        Subjective Pain    Able to rate subjective pain?  no  -LA        Short-Term Goals    STG- 1  Patient will demonstrate NNS burst 10 to 12 sucks in length in 80% of opportunities.   -LA     Status: STG- 1  New;Progressing as expected  -LA     STG- 2  Patient will achieve appropriate calm state before feeding interaction begins and will reurn to a calm state during feeding if signs of distressed are demonstrated, to allow for an optimal feeding experience in at least 80% of opportunities.   -LA     Status: STG- 2  New;Progressing as expected  -LA     STG- 3  Caregivers will demonstrate knowledge and understanding of therapy techniques, and be able to implement them in the home setting.   -LA     Status: STG- 3  New;Progressing as expected  -LA        Long-Term Goals    LTG- 1  Caregivers will be independent with home treatment plan.  -LA     Status: LTG- 1  New  -LA     LTG- 2  Patient will consume 90% of ordered volume PO in >30 minutes without s/sx of aspiration or aversion.   -LA        SLP Time Calculation    SLP Goal Re-Cert Due Date  01/05/21  -LA       User Key  (r) = Recorded By, (t) = Taken By, (c) = Cosigned By    Initials Name Provider Type    Letty Sanchez MS CCC-SLP Speech and Language Pathologist        OP SLP Education     Row Name 12/08/20 1400       Education    Barriers to Learning  No barriers identified  -LA    Education Provided  Family/caregivers require further education on strategies, risks;Patient requires further education on strategies, risks;Family/caregivers demonstrated recommended strategies  -LA    Assessed  Learning readiness;Learning preferences;Learning motivation;Learning needs  -LA    Learning Motivation  Strong  -LA    Learning Method  Demonstration;Explanation  -LA    Teaching Response  Demonstrated understanding;Verbalized understanding  -LA    Education  Comments  Mother to perform daily oral stimulation exercises.  Mother verbalized understanding.  -LA      User Key  (r) = Recorded By, (t) = Taken By, (c) = Cosigned By    Initials Name Effective Dates    Letty Sanchez, MS CCC-SLP 11/13/17 -                  Time Calculation:   SLP Start Time: 1403  SLP Stop Time: 1459  SLP Time Calculation (min): 56 min    Therapy Charges for Today     Code Description Service Date Service Provider Modifiers Qty    [id number]  ST TREATMENT SWALLOW 4 2020 Letty Stein, MS CCC-SLP GN 1                     Letty Stein MS CCC-SLP  2020

## 2024-01-26 NOTE — ASSESSMENT & PLAN NOTE
Pt presented to our ED on 1/25 from [facility] for hypoxia. Placed on BiPAP after he was refractory to 15L NRB. Likely 2/2 PNA. Received ceftriaxone and azithromycin in the ED.     Plan:  -Continue BiPAP. Wean as tolerated  -Treat PNA

## 2024-01-26 NOTE — UTILIZATION REVIEW
Initial Clinical Review    Admission: Date/Time/Statement:   Admission Orders (From admission, onward)       Ordered        01/25/24 2115  INPATIENT ADMISSION  Once                          Orders Placed This Encounter   Procedures    INPATIENT ADMISSION     Standing Status:   Standing     Number of Occurrences:   1     Order Specific Question:   Level of Care     Answer:   Critical Care [15]     Order Specific Question:   Estimated length of stay     Answer:   Not Applicable     ED Arrival Information       Expected   -    Arrival   1/25/2024 18:28    Acuity   Emergent              Means of arrival   Ambulance    Escorted by   [facility]    Service   Hospitalist    Admission type   Emergency              Arrival complaint   A-fib - EMS TW             Chief Complaint   Patient presents with    Shortness of Breath     Arrives via EMS from [facility]. Staff called for SOB and decreased oxygen levels. Oxygen saturation in the 80s on 4L NC, which is chronic. EMS started patient on 15L NRB. Patient newly agitated. Hx of afib, with rapid HR on the monitor in triage.       Initial Presentation: 89 y.o. male presents to ED via  EMS  from [facility] after being found hypoxic. Placed on  BiPap in ED for increased WOB and given cardizem for Afib/RVR. CXR suspicious for  PNA and given ALTAGRACIA>   Met sepsis criteria in ED.  PMH  is Afib, dementia.  Admit  Ip Stepdown LOC  with Acute respiratory failure with hypoxia,  Sepsis, Afib, likely  D/T  sepsis and plan is  15 L  NRB,  monitor labs, ALTAGRACIA, dementia precautions and continue  current meds.        Date:   1/26   Day 2:   Denies  SOB, has  wet sounding cough, non productive.  Continue  O@  to keep sats  >  92  %, currently on  4 L NC, can wean as tolerated.  Continue  ALTAGRACIA.   Need strict I & O.  Need CT  chest.   Monitor labs.   Monitor  BP.         ED Triage Vitals   Temperature Pulse Respirations Blood Pressure SpO2   01/25/24 1832 01/25/24 1832 01/25/24 1832 01/25/24 1835 01/25/24  1832   99.4 °F (37.4 °C) (!) 171 (!) 24 (!) 195/90 93 %      Temp Source Heart Rate Source Patient Position - Orthostatic VS BP Location FiO2 (%)   01/25/24 1832 01/25/24 1832 01/25/24 1832 01/25/24 1832 --   Oral Monitor Sitting Right arm       Pain Score       01/26/24 0000       No Pain          Wt Readings from Last 1 Encounters:   01/26/24 75.8 kg (167 lb 1.7 oz)     Additional Vital Signs:   1/26/24 0400 -- 71 17 92/46 Abnormal  67 97 % -- -- -- -- --   01/26/24 0300 -- 72 19 88/58 Abnormal  67 97 % -- -- -- -- --   01/26/24 0233 -- 76 19 85/44 Abnormal  61 Abnormal  95 % -- -- -- -- --   01/26/24 0200 98.4 °F (36.9 °C) -- -- -- -- -- -- -- -- -- --   01/26/24 0103 -- 76 25 Abnormal  101/57 75 97 % -- -- -- -- --   01/26/24 0100 -- 81 46 Abnormal  88/56 Abnormal  67 95 % -- -- -- -- --   01/26/24 0000 -- 77 20 95/54 72 97 % -- -- -- -- --   01/25/24 2300 -- 79 21 103/57 76 99 % -- -- -- -- --   01/25/24 2200 -- 100 20 100/50 71 97 % -- -- -- Full face mask --   01/25/24 2144 -- 91 20 102/51 -- 95 % -- -- BiPAP -- --   01/25/24 2130 -- 96 28 Abnormal  95/52 70 96 % -- -- -- -- --   01/25/24 2115 -- 97 26 Abnormal  105/56 76 95 % -- -- BiPAP -- --   01/25/24 2104 -- 112 Abnormal  27 Abnormal  111/79 -- 95 % -- -- BiPAP -- Sitting   01/25/24 2057 101.1 °F (38.4 °C) Abnormal  -- -- -- -- -- -- -- -- -- --   01/25/24 2030 -- 100 -- 107/75 86 97 % -- -- BiPAP -- Sitting   01/25/24 2021 -- 94 -- 98/67 78 96 % -- -- BiPAP -- Sitting   01/25/24 2015 -- 98 26 Abnormal  119/57 82 95 % -- -- -- -- --   01/25/24 2010 -- -- -- -- -- 96 % -- -- -- -- --   01/25/24 2000 -- 101 30 Abnormal  120/63 86 94 % -- -- -- -- --   01/25/24 1945 -- 98 26 Abnormal  129/59 85 95 % -- -- BiPAP -- --   01/25/24 1930 -- 109 Abnormal  35 Abnormal  121/71 93 94 % -- -- BiPAP -- Sitting   01/25/24 1915 -- 101 33 Abnormal  118/69 81 96 % -- -- -- -- --   01/25/24 1911 -- -- -- -- -- -- -- -- BiPAP -- --   01/25/24 1903 -- 99 -- -- -- -- -- -- --  -- --   01/25/24 1902 -- 87 30 Abnormal  124/55 -- 94 % -- -- BiPAP -- Sitting   01/25/24 1855 -- 93 -- -- -- -- -- -- -- -- --   01/25/24 1847 -- 162 Abnormal  24 Abnormal  180/89 Abnormal  128 97 % -- -- BiPAP -- Sitting   01/25/24 1844 -- -- -- -- -- -- -- -- -- Face mask --   01/25/24 1840 97.4 °F (36.3 °C) Abnormal  -- -- -- -- -- -- -- -- -- --   01/25/24 1835 -- -- -- 195/90 Abnormal  -- -- -- -- -- -- --   01/25/24 1832 99.4 °F (37.4 °C) 171 Abnormal  24 Abnormal  -- -- 93 % 44 6 L/min Nasal cannula -- Sitting     Pertinent Labs/Diagnostic Test Results:   EKG  Afib/RVR       XR chest 1 view portable   Final Result by Tito Harrison MD (01/26 0824)      Multifocal airspace opacities, suspicious for pneumonia. Recommend follow-up until radiographic clearing.      Trace left and possible trace right pleural effusions.      The study was marked in EPIC for immediate notification.                  Workstation performed: [id number]         CT chest wo contrast    (Results Pending)     Results from last 7 days   Lab Units 01/25/24 1845   SARS-COV-2  Negative     Results from last 7 days   Lab Units 01/26/24 0451 01/25/24 1844   WBC Thousand/uL 19.09* 14.44*   HEMOGLOBIN g/dL 13.2 16.1   HEMATOCRIT % 40.4 49.4*   PLATELETS Thousands/uL 95* 145*   NEUTROS ABS Thousands/µL 16.59* 10.62*         Results from last 7 days   Lab Units 01/26/24  0451 01/25/24 1844   SODIUM mmol/L 139 138   POTASSIUM mmol/L 4.9 4.7   CHLORIDE mmol/L 108 105   CO2 mmol/L 22 24   ANION GAP mmol/L 9 9   BUN mg/dL 37* 36*   CREATININE mg/dL 1.41* 1.37*   EGFR ml/min/1.73sq m 42 44   CALCIUM mg/dL 8.5 9.5   CALCIUM, IONIZED mmol/L 1.09*  --    MAGNESIUM mg/dL 3.2* 1.8*   PHOSPHORUS mg/dL 3.9  --      Results from last 7 days   Lab Units 01/26/24  0451 01/25/24  1844   AST U/L 15 21   ALT U/L 9 13   ALK PHOS U/L 46 70   TOTAL PROTEIN g/dL 5.6* 7.4   ALBUMIN g/dL 3.2* 4.3   TOTAL BILIRUBIN mg/dL 1.23* 0.82         Results from last 7 days    Lab Units 01/26/24 0451 01/25/24  1844   GLUCOSE RANDOM mg/dL 114 117         Results from last 7 days   Lab Units 01/25/24 2047 01/25/24 1845   PH JEN  7.415* 7.353   PCO2 JEN mm Hg 34.5* 38.6*   PO2 JEN mm Hg 51.6* 40.3   HCO3 JEN mmol/L 21.6* 21.0*   BASE EXC JEN mmol/L -2.1 -4.0   O2 CONTENT JEN ml/dL 18.0 17.1   O2 HGB, VENOUS % 83.0* 69.6             Results from last 7 days   Lab Units 01/25/24 2251 01/25/24 2047 01/25/24  1844   HS TNI 0HR ng/L  --   --  11   HS TNI 2HR ng/L  --  13  --    HSTNI D2 ng/L  --  2  --    HS TNI 4HR ng/L 17  --   --    HSTNI D4 ng/L 6  --   --      Results from last 7 days   Lab Units 01/25/24  1844   D-DIMER QUANTITATIVE ug/ml FEU 0.88*     Results from last 7 days   Lab Units 01/25/24  1844   PROTIME seconds 14.3   INR  1.05   PTT seconds 28         Results from last 7 days   Lab Units 01/26/24 0451 01/25/24  2340   PROCALCITONIN ng/ml 19.90* 14.50*     Results from last 7 days   Lab Units 01/25/24 2047 01/25/24  1844   LACTIC ACID mmol/L 1.3 3.5*             Results from last 7 days   Lab Units 01/25/24  1844   BNP pg/mL 365*                     Results from last 7 days   Lab Units 01/25/24  1844   LIPASE u/L 36                 Results from last 7 days   Lab Units 01/25/24 1959   CLARITY UA  Clear   COLOR UA  Light Yellow   SPEC GRAV UA  1.014   PH UA  5.5   GLUCOSE UA mg/dl Negative   KETONES UA mg/dl Negative   BLOOD UA  Negative   PROTEIN UA mg/dl Negative   NITRITE UA  Negative   BILIRUBIN UA  Negative   UROBILINOGEN UA (BE) mg/dl <2.0   LEUKOCYTES UA  Negative     Results from last 7 days   Lab Units 01/25/24 2047 01/25/24 1954 01/25/24 1845   STREP PNEUMONIAE ANTIGEN, URINE   --  Negative  --    LEGIONELLA URINARY ANTIGEN  Negative  --   --    INFLUENZA A PCR   --   --  Negative   INFLUENZA B PCR   --   --  Negative   RSV PCR   --   --  Negative                             Results from last 7 days   Lab Units 01/25/24  1844 01/25/24  1842   BLOOD CULTURE   Received in Microbiology Lab. Culture in Progress. Received in Microbiology Lab. Culture in Progress.                   ED Treatment:   Medication Administration from 01/25/2024 1828 to 01/25/2024 2227         Date/Time Order Dose Route Action Comments     01/25/2024 1903 EST diltiazem (CARDIZEM) 125 mg in sodium chloride 0.9 % 125 mL infusion 5 mg/hr Intravenous New Bag --     01/25/2024 1848 EST diltiazem (CARDIZEM) injection 20 mg 20 mg Intravenous Given --     01/25/2024 2024 EST ceftriaxone (ROCEPHIN) 1 g/50 mL in dextrose IVPB 0 mg Intravenous Stopped --     01/25/2024 1955 EST ceftriaxone (ROCEPHIN) 1 g/50 mL in dextrose IVPB 1,000 mg Intravenous New Bag --     01/25/2024 2130 EST azithromycin (ZITHROMAX) 500 mg in sodium chloride 0.9% 250mL IVPB 500 mg 0 mg Intravenous Stopped --     01/25/2024 2023 EST azithromycin (ZITHROMAX) 500 mg in sodium chloride 0.9% 250mL IVPB 500 mg 500 mg Intravenous New Bag --     01/25/2024 2009 EST ipratropium-albuterol (DUO-NEB) 0.5-2.5 mg/3 mL inhalation solution 3 mL 3 mL Nebulization Given --     01/25/2024 2049 EST magnesium sulfate 2 g/50 mL IVPB (premix) 2 g 0 g Intravenous Stopped --     01/25/2024 2014 EST magnesium sulfate 2 g/50 mL IVPB (premix) 2 g 2 g Intravenous New Bag --     01/25/2024 2015 EST furosemide (LASIX) injection 20 mg 20 mg Intravenous Given --     01/25/2024 2125 EST acetaminophen (Ofirmev) injection 1,000 mg 0 mg Intravenous Stopped --     01/25/2024 2109 EST acetaminophen (Ofirmev) injection 1,000 mg 1,000 mg Intravenous New Bag --            Present on Admission:   Sepsis (HCC)   Dementia (HCC)      Admitting Diagnosis: Atrial fibrillation (HCC) [I48.91]  Pneumonia [J18.9]  Dyspnea [R06.00]  Hypoxia [R09.02]  Severe sepsis (HCC) [A41.9, R65.20]  Age/Sex: 89 y.o. male  Admission Orders:  Scheduled Medications:  apixaban, 2.5 mg, Oral, BID  cefepime, 2,000 mg, Intravenous, Q12H  chlorhexidine, 15 mL, Mouth/Throat, Q12H PROSPER  ipratropium, 0.5 mg,  Nebulization, TID  levalbuterol, 1.25 mg, Nebulization, TID  metoprolol tartrate, 25 mg, Oral, Q12H PROSPER  vancomycin, 25 mg/kg, Intravenous, Once  [START ON 1/27/2024] vancomycin, 750 mg, Intravenous, Q24H      Continuous IV Infusions:     PRN Meds:       IP CONSULT TO CASE MANAGEMENT  IP CONSULT TO PHARMACY    Network Utilization Review Department  ATTENTION: Please call with any questions or concerns to [phone] and carefully listen to the prompts so that you are directed to the right person. All voicemails are confidential.   For Discharge needs, contact Care Management DC Support Team at [phone] opt. [phone]  Send all requests for admission clinical reviews, approved or denied determinations and any other requests to dedicated fax number below belonging to the [facility] where the patient is receiving treatment. List of dedicated fax numbers for the Facilities:  FACILITY NAME UR FAX NUMBER   ADMISSION DENIALS (Administrative/Medical Necessity) [phone]   DISCHARGE SUPPORT TEAM (NETWORK) [phone]   PARENT CHILD HEALTH (Maternity/NICU/Pediatrics) [phone]   [facility] [phone]   [facility] [phone]   [facility] [phone]   [facility] [phone]   [facility] [phone]   [facility] [phone]   [facility] [phone]   [facility] [phone]   [facility] [phone]   [facility] [phone]   [facility] [phone]   [facility] [phone]

## 2024-01-26 NOTE — SEPSIS NOTE
"  Sepsis Note   Satish Novak 89 y.o. male MRN: [id number]  Unit/Bed#: ED-28 Encounter: [id number]          Complete 30 cc/kg IVF will not be given 2/2 elevated BNP, concern for new CHF.     Initial Sepsis Screening       Row Name 01/25/24 1930                Is the patient's history suggestive of a new or worsening infection? Yes (Proceed)  -MS        Suspected source of infection pneumonia  -MS        Indicate SIRS criteria Tachycardia > 90 bpm;Tachypnea > 20 resp per min;Leukocytosis (WBC > 80222 IJL) OR Leukopenia (WBC <4000 IJL) OR Bandemia (WBC >10% bands)  -MS        Are two or more of the above signs & symptoms of infection both present and new to the patient? Yes (Proceed)  -MS        Assess for evidence of organ dysfunction: Are any of the below criteria present within 6 hours of suspected infection and SIRS criteria that are NOT considered to be chronic conditions? New need for invasive/non-invasive ventilation;Lactate > 2.0  -MS        Date of presentation of severe sepsis 01/25/24  -MS        Time of presentation of severe sepsis 1930  -MS        Sepsis Note: Click \"NEXT\" below (NOT \"close\") to generate sepsis note based on above information. YES (proceed by clicking \"NEXT\")  -MS                  User Key  (r) = Recorded By, (t) = Taken By, (c) = Cosigned By      Initials Name Provider Type    MS Rebecca Roth MD Resident                        There is no height or weight on file to calculate BMI.  Wt Readings from Last 1 Encounters:   08/17/23 71.3 kg (157 lb 3.2 oz)        Ideal body weight: 73 kg (160 lb 15 oz)    "

## 2024-01-26 NOTE — CASE MANAGEMENT
Case Management Assessment & Discharge Planning Note    Patient name Satish Novak  Location ICU 07/ICU 07 MRN [id number]  : 1931 Date 2024       Current Admission Date: 2024  Current Admission Diagnosis:Acute hypoxic respiratory failure (HCC)   Patient Active Problem List    Diagnosis Date Noted    Atrial fibrillation (HCC) 2024    Acute hypoxic respiratory failure (HCC) 2024    Hospital discharge follow-up 2023    Acute gout of left foot 2023    Sepsis (HCC) 2023    Chronic thrombocytopenia (HCC) 2023    Sensory neural hearing loss 2023    Hyperlipidemia 2023    Prediabetes 2023    Fall 2023    Paroxysmal atrial fibrillation (HCC) 2023    Dementia (HCC) 2023      LOS (days): 1  Geometric Mean LOS (GMLOS) (days): 5.1  Days to GMLOS:4.5     OBJECTIVE:    Risk of Unplanned Readmission Score: 17.63         Current admission status: Inpatient       Preferred Pharmacy:   UNKNOWN - FOLLOW UP PRIOR TO DISCHARGE TO E-PRESCRIBE  No address on file      Primary Care Provider: Cecelia Jarrell MD    Primary Insurance: [employer]  Secondary Insurance: [facility]    ASSESSMENT:  Active Health Care Proxies    There are no active Health Care Proxies on file.       Patient Information  Admitted from:: Facility ([facility])  Mental Status: Confused  During Assessment patient was accompanied by: Not accompanied during assessment  Assessment information provided by:: Other - please comment (Jong)  County of Residence: [place]  What city do you live in?: [place]  Type of Current Residence: Facility    Activities of Daily Living Prior to Admission  Functional Status: Assistance  Completes ADLs independently?: No  Level of ADL dependence: Assistance (Set up and some assistance with ADLs)  Ambulates independently?: Yes  Does patient use assisted devices?: No  Does patient currently own DME?: No    Housing Stability: Patient Unable  To Answer (1/26/2024)    Housing Stability Vital Sign     Unable to Pay for Housing in the Last Year: Patient unable to answer     Number of Places Lived in the Last Year: Not on file     Unstable Housing in the Last Year: Patient unable to answer   Food Insecurity: Patient Unable To Answer (1/26/2024)    Hunger Vital Sign     Worried About Running Out of Food in the Last Year: Patient unable to answer     Ran Out of Food in the Last Year: Patient unable to answer   Transportation Needs: Patient Unable To Answer (1/26/2024)    PRAPARE - Transportation     Lack of Transportation (Medical): Patient unable to answer     Lack of Transportation (Non-Medical): Patient unable to answer   Utilities: Patient Unable To Answer (1/26/2024)    [facility] Utilities     Threatened with loss of utilities: Patient unable to answer       DISCHARGE DETAILS:    Discharge planning discussed with:: Eduardo Leal  Freedom of Choice: Yes  Comments - Freedom of Choice: Return to [facility]    Contacts  Patient Contacts: Eduardo Dickinson  Reason/Outcome: Continuity of Care, Emergency Contact, Discharge Planning, Referral    Other Referral/Resources/Interventions Provided:  Interventions: SNF  Referral Comments: Chart reviewed. Pt admitted from [facility] where pt is a LTC resident. DEEPTI spoke with Brandie at [facility]. Pt is on their dementia unit. Indepednent with ambulation and able to feed self (with set up). Pt needs some assistance for ADLs. DEEPTI also spoke with Luciana Leal's law office. Luciana updated DEEPTI with correct cell phone number for Teena [phone]. DEEPTI spoke with Teena and she did confirm plan is for pt to return to [facility] once medically stable.    Treatment Team Recommendation: SNF  Discharge Destination Plan:: SNF

## 2024-01-26 NOTE — H&P
[facility]  H&P  Name: Satish Novak 89 y.o. male I MRN: [id number]  Unit/Bed#: ICU 07 I Date of Admission: 1/25/2024   Date of Service: 1/25/2024 I Hospital Day: 0      Assessment/Plan   Acute hypoxic respiratory failure (HCC)  Assessment & Plan  Pt presented to our ED on 1/25 from [facility] for hypoxia. Placed on BiPAP after he was refractory to 15L NRB. Likely 2/2 PNA. Received ceftriaxone and azithromycin in the ED.     Plan:  -Currently off Bipap and on NC.  -Treat PNA    Atrial fibrillation (HCC)  Assessment & Plan  Pt with Afib presenting with RVR. Likely 2/2 sepsis.    Plan:  -Currently on rate control with diltiazem in the ED. Will likely transition to Pt's home medication of Lopressor 25mg BID.  -Continue home eliquis    Sepsis (HCC)  Assessment & Plan  Pt presented to our ED on 1/25 from [facility] after he was found to be hypoxic. He was placed on 15L NRB by EMS, but then began having increased WOB in the ED for which BiPAP was initiated. Received ceftriaxone and azithromycin in the ED.     Plan:  -Off BiPAP; resting comfortably on NC.  -Continue ceftriaxone and azithromycin  - Serial VBGs while on mechanical ventilation  -Trend WBC, Procalcitonin, Lactate  -F/u Bcx and narrow abx per sensitivities  -Monitor hemodynamics, if Pt begins to become hypotensive initiated fluid resuscitation until 30cc/kg has been achieved prior to starting vasopressors.    Dementia ([facility])  Assessment & Plan  -Dementia precautions           History of Present Illness     HPI: Satish Novak is a 89 y.o. who presents with h/o Afib, dementia presenting for hypoxia. Pt presents from [facility] after being found hypoxic. In the ED he was placed on Bipap for increased WOB, and diltiazem for Afib w/ RVR. CXR suspicious for PNA for which he was placed on ceftriaxone and azithromycin. Pt transferred to the ICU for further w/u and management.    History obtained from chart review.  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   Current Outpatient Medications   Medication Instructions    acetaminophen (TYLENOL) 325 mg, Oral, Every 4 hours PRN, 2 tablets    albuterol (2.5 mg/3 mL) 0.083 % nebulizer solution No dose, route, or frequency recorded.    aluminum-magnesium hydroxide-simethicone (MAALOX) 200-200-20 MG/5ML SUSP 30 mL, Oral, 4 times daily (before meals and at bedtime)    apixaban (ELIQUIS) 5 mg, Oral, 2 times daily    cholecalciferol (VITAMIN D3) 2,000 Units, Oral, Daily    colchicine (COLCRYS) 0.6 mg, Oral, 2 times daily    cyanocobalamin (VITAMIN B-12) 2,000 mcg, Oral, Daily    ipratropium-albuterol (DUO-NEB) 0.5-2.5 mg/3 mL nebulizer solution No dose, route, or frequency recorded.    loperamide (IMODIUM A-D) 2 mg, Oral, 4 times daily PRN    metoprolol tartrate (LOPRESSOR) 25 mg, Oral, Every 12 hours scheduled    QUEtiapine (SEROQUEL) 12.5 mg, Oral, Daily    No Known Allergies   Social History     Tobacco Use    Smoking status: Never     Passive exposure: Never    Smokeless tobacco: Never   Vaping Use    Vaping status: Never Used    Family History   Problem Relation Age of Onset    No Known Problems Mother     No Known Problems Father           Objective                            Vitals I/O      Most Recent Min/Max in 24hrs   Temp (!) 101.1 °F (38.4 °C) Temp  Min: 97.4 °F (36.3 °C)  Max: 101.1 °F (38.4 °C)   Pulse 100 Pulse  Min: 87  Max: 171   Resp 20 Resp  Min: 20  Max: 35   /50 BP  Min: 95/52  Max: 195/90   O2 Sat 97 % SpO2  Min: 93 %   Max: 97 %      Intake/Output Summary (Last 24 hours) at 1/25/2024 2343  Last data filed at 1/25/2024 2130  Gross per 24 hour   Intake 450 ml   Output --   Net 450 ml       Diet NPO; Sips with meds    Invasive Monitoring           Physical Exam   Physical Exam  Skin:     General: Skin is warm and dry.      Capillary Refill: Capillary refill takes less than 2 seconds.   HENT:      Mouth/Throat:      Mouth: Mucous membranes are moist.   Cardiovascular:      Rate and Rhythm: Normal rate. Rhythm irregular.      Pulses: Normal pulses.   Abdominal: General: Bowel sounds are normal.      Palpations: Abdomen is soft.   Constitutional:       General: He is not in acute distress.     Appearance: He is well-developed and well-nourished.   Pulmonary:      Effort: Pulmonary effort is normal.      Breath sounds: Normal breath sounds.            Diagnostic Studies      EKG (1/25): Afib 81bpm; QRS 70; Qtc 378  CXR (1/25): suspicious for RLL infiltrate I have personally reviewed pertinent reports.       Medications:  Scheduled PRN   [START ON 1/26/2024] apixaban, 2.5 mg, BID  cefTRIAXone, 1,000 mg, Q24H  chlorhexidine, 15 mL, Q12H PROSPER  magnesium sulfate, 2 g, Once  metoprolol tartrate, 25 mg, Q12H PROSPER          Continuous    diltiazem, 1-15 mg/hr, Last Rate: 5 mg/hr (01/25/24 1903)         Labs:    CBC    Recent Labs     01/25/24  1844   WBC 14.44*   HGB 16.1   HCT 49.4*   *     BMP    Recent Labs     01/25/24  1844   SODIUM 138   K 4.7      CO2 24   AGAP 9   BUN 36*   CREATININE 1.37*   CALCIUM 9.5       Coags    Recent Labs     01/25/24  1844   INR 1.05   PTT 28        Additional Electrolytes  Recent Labs     01/25/24  1844   MG 1.8*          Blood Gas    No recent results  Recent Labs     01/25/24 2047   PHVEN 7.415*   [id number] 34.5*   PO2VEN 51.6*   CFW2ILA 21.6*   BEVEN -2.1   O3MWTDV 83.0*    LFTs  Recent Labs     01/25/24  1844   ALT 13   AST 21   ALKPHOS 70   ALB 4.3   TBILI 0.82       Infectious  No recent  results  Glucose  Recent Labs     01/25/24  1844   GLUC 117             Critical Care Time Statement: Upon my evaluation, this patient had a high probability of imminent or life-threatening deterioration due to hypoxic respiratory distress, which required my direct attention, intervention, and personal management.  I spent a total of 45 minutes directly providing critical care services, including interpretation of complex medical databases, evaluating for the presence of life-threatening injuries or illnesses, management of organ system failure(s) , complex medical decision making (to support/prevent further life-threatening deterioration)., interpretation of hemodynamic data, and ventilator management. This time is exclusive of procedures, teaching, family meetings, and any prior time recorded by providers other than myself.    Anticipated Length of Stay is > 2 midnights  Satish Luna MD

## 2024-01-26 NOTE — ASSESSMENT & PLAN NOTE
Pt with Afib presenting with RVR. Likely 2/2 sepsis.    Plan:  -Currently on rate control with diltiazem in the ED. Will likely transition to Pt's home medication of Lopressor 25mg BID.  -Continue home eliquis

## 2024-01-26 NOTE — ASSESSMENT & PLAN NOTE
Pt presented to our ED on 1/25 from [facility] after he was found to be hypoxic. He was placed on 15L NRB by EMS, but then began having increased WOB in the ED for which BiPAP was initiated. Received ceftriaxone and azithromycin in the ED.     Plan:  -Continue BiPAP. If respiratory status declines than intubation will follow.  -Continue ceftriaxone and azithromycin  - Serial VBGs  -Trend WBC, Procalcitonin, Lactate  -F/u Bcx and narrow abx per sensitivities  -Monitor Hds, if Pt begins to become hypotensive initiated fluid resuscitation until 30cc/kg has been achieved prior to starting vasopressors.

## 2024-01-26 NOTE — PLAN OF CARE
Problem: SAFETY,RESTRAINT: NV/NON-SELF DESTRUCTIVE BEHAVIOR  Goal: Remains free of harm/injury (restraint for non violent/non self-detsructive behavior)  Description: INTERVENTIONS:  - Instruct patient/family regarding restraint use   - Assess and monitor physiologic and psychological status   - Provide interventions and comfort measures to meet assessed patient needs   - Identify and implement measures to help patient regain control  - Assess readiness for release of restraint   Outcome: Progressing  Goal: Returns to optimal restraint-free functioning  Description: INTERVENTIONS:  - Assess the patient's behavior and symptoms that indicate continued need for restraint  - Identify and implement measures to help patient regain control  - Assess readiness for release of restraint   Outcome: Progressing     Problem: Prexisting or High Potential for Compromised Skin Integrity  Goal: Skin integrity is maintained or improved  Description: INTERVENTIONS:  - Identify patients at risk for skin breakdown  - Assess and monitor skin integrity  - Assess and monitor nutrition and hydration status  - Monitor labs   - Assess for incontinence   - Turn and reposition patient  - Assist with mobility/ambulation  - Relieve pressure over bony prominences  - Avoid friction and shearing  - Provide appropriate hygiene as needed including keeping skin clean and dry  - Evaluate need for skin moisturizer/barrier cream  - Collaborate with interdisciplinary team   - Patient/family teaching  - Consider wound care consult   Outcome: Progressing

## 2024-01-26 NOTE — ED ATTENDING ATTESTATION
1/25/2024  I, Tera Carroll MD, saw and evaluated the patient. I have discussed the patient with the resident/non-physician practitioner and agree with the resident's/non-physician practitioner's findings, Plan of Care, and MDM as documented in the resident's/non-physician practitioner's note, except where noted. All available labs and Radiology studies were reviewed.  I was present for key portions of any procedure(s) performed by the resident/non-physician practitioner and I was immediately available to provide assistance.       At this point I agree with the current assessment done in the Emergency Department.  I have conducted an independent evaluation of this patient a history and physical is as follows:    89-year-old male sent from nursing facility for evaluation of low oxygen saturation hypertension.  Patient arrives in respiratory distress, tachypneic using accessory muscles.  Has a POLST indicating DNR/DNI with limited interventions.  Arrives in rapid atrial fibrillation rate in the 170s.  Blood pressure elevated systolic 190s.  Breath sounds somewhat diminished with some slight expiratory wheezes.  Trace peripheral edema.    Placed on BiPAP, given diltiazem bolus followed by infusion for rapid atrial fibrillation.  If blood pressure becomes unstable we will plan cardioversion -pads in place.    ED Course  ED Course as of 01/26/24 0057   Thu Jan 25, 2024 1923 LACTIC ACID(!!): 3.5   1941 WBC(!): 14.44   1941 BUN(!): 36   1941 Creatinine(!): 1.37  Baseline.   2010 SARS-COV-2: Negative   2010 INFLU A PCR: Negative   2010 INFLU B PCR: Negative   2010 RSV PCR: Negative   2010 Patient's work of breathing has significantly improved on BiPAP and he appears comfortable but he does remain tachypneic.   2010 Treating patchy bilateral lower lobe infiltrates with ceftriaxone, azithromycin.   2011 Heart rate remains stable on diltiazem infusion.     Presentation consistent with severe sepsis secondary to  pneumonia.  Alert called.  Admitted to stepdown level bed.    Critical Care Time  CriticalCare Time    Date/Time: 1/25/2024 8:39 PM    Performed by: Tera Carroll MD  Authorized by: Tera Carroll MD    Critical care provider statement:     Critical care time (minutes):  40    Critical care time was exclusive of:  Separately billable procedures and treating other patients and teaching time    Critical care was necessary to treat or prevent imminent or life-threatening deterioration of the following conditions:  Respiratory failure and sepsis    Critical care was time spent personally by me on the following activities:  Obtaining history from patient or surrogate, development of treatment plan with patient or surrogate, discussions with consultants, evaluation of patient's response to treatment, examination of patient, ordering and performing treatments and interventions, ordering and review of laboratory studies, ordering and review of radiographic studies, re-evaluation of patient's condition and review of old charts

## 2024-01-27 PROBLEM — G93.41 METABOLIC ENCEPHALOPATHY: Status: ACTIVE | Noted: 2024-01-27

## 2024-01-27 LAB
ALBUMIN SERPL BCP-MCNC: 3.4 G/DL (ref 3.5–5)
ALP SERPL-CCNC: 53 U/L (ref 34–104)
ALT SERPL W P-5'-P-CCNC: 9 U/L (ref 7–52)
ANION GAP SERPL CALCULATED.3IONS-SCNC: 5 MMOL/L
AST SERPL W P-5'-P-CCNC: 18 U/L (ref 13–39)
BASOPHILS # BLD AUTO: 0.04 THOUSANDS/ÂΜL (ref 0–0.1)
BASOPHILS NFR BLD AUTO: 0 % (ref 0–1)
BILIRUB SERPL-MCNC: 1.38 MG/DL (ref 0.2–1)
BUN SERPL-MCNC: 28 MG/DL (ref 5–25)
CALCIUM ALBUM COR SERPL-MCNC: 9.4 MG/DL (ref 8.3–10.1)
CALCIUM SERPL-MCNC: 8.9 MG/DL (ref 8.4–10.2)
CHLORIDE SERPL-SCNC: 109 MMOL/L (ref 96–108)
CO2 SERPL-SCNC: 26 MMOL/L (ref 21–32)
CREAT SERPL-MCNC: 1.13 MG/DL (ref 0.6–1.3)
EOSINOPHIL # BLD AUTO: 0.08 THOUSAND/ÂΜL (ref 0–0.61)
EOSINOPHIL NFR BLD AUTO: 1 % (ref 0–6)
ERYTHROCYTE [DISTWIDTH] IN BLOOD BY AUTOMATED COUNT: 13.5 % (ref 11.6–15.1)
GFR SERPL CREATININE-BSD FRML MDRD: 56 ML/MIN/1.73SQ M
GLUCOSE SERPL-MCNC: 81 MG/DL (ref 65–140)
HCT VFR BLD AUTO: 42.8 % (ref 36.5–49.3)
HGB BLD-MCNC: 13.9 G/DL (ref 12–17)
IMM GRANULOCYTES # BLD AUTO: 0.05 THOUSAND/UL (ref 0–0.2)
IMM GRANULOCYTES NFR BLD AUTO: 1 % (ref 0–2)
LYMPHOCYTES # BLD AUTO: 1.39 THOUSANDS/ÂΜL (ref 0.6–4.47)
LYMPHOCYTES NFR BLD AUTO: 13 % (ref 14–44)
MAGNESIUM SERPL-MCNC: 2.4 MG/DL (ref 1.9–2.7)
MCH RBC QN AUTO: 33.6 PG (ref 26.8–34.3)
MCHC RBC AUTO-ENTMCNC: 32.5 G/DL (ref 31.4–37.4)
MCV RBC AUTO: 103 FL (ref 82–98)
MONOCYTES # BLD AUTO: 0.96 THOUSAND/ÂΜL (ref 0.17–1.22)
MONOCYTES NFR BLD AUTO: 9 % (ref 4–12)
MRSA NOSE QL CULT: NORMAL
NEUTROPHILS # BLD AUTO: 8.52 THOUSANDS/ÂΜL (ref 1.85–7.62)
NEUTS SEG NFR BLD AUTO: 76 % (ref 43–75)
NRBC BLD AUTO-RTO: 0 /100 WBCS
PHOSPHATE SERPL-MCNC: 2.3 MG/DL (ref 2.3–4.1)
PLATELET # BLD AUTO: 95 THOUSANDS/UL (ref 149–390)
PLATELET BLD QL SMEAR: ABNORMAL
PMV BLD AUTO: 10.8 FL (ref 8.9–12.7)
POTASSIUM SERPL-SCNC: 4.2 MMOL/L (ref 3.5–5.3)
PROCALCITONIN SERPL-MCNC: 14.97 NG/ML
PROT SERPL-MCNC: 6.3 G/DL (ref 6.4–8.4)
RBC # BLD AUTO: 4.14 MILLION/UL (ref 3.88–5.62)
RBC MORPH BLD: NORMAL
SODIUM SERPL-SCNC: 140 MMOL/L (ref 135–147)
WBC # BLD AUTO: 11.04 THOUSAND/UL (ref 4.31–10.16)

## 2024-01-27 PROCEDURE — 84100 ASSAY OF PHOSPHORUS: CPT | Performed by: NURSE PRACTITIONER

## 2024-01-27 PROCEDURE — 94640 AIRWAY INHALATION TREATMENT: CPT

## 2024-01-27 PROCEDURE — 94760 N-INVAS EAR/PLS OXIMETRY 1: CPT

## 2024-01-27 PROCEDURE — 84145 PROCALCITONIN (PCT): CPT | Performed by: NURSE PRACTITIONER

## 2024-01-27 PROCEDURE — 85025 COMPLETE CBC W/AUTO DIFF WBC: CPT | Performed by: NURSE PRACTITIONER

## 2024-01-27 PROCEDURE — 99233 SBSQ HOSP IP/OBS HIGH 50: CPT | Performed by: INTERNAL MEDICINE

## 2024-01-27 PROCEDURE — 80053 COMPREHEN METABOLIC PANEL: CPT | Performed by: NURSE PRACTITIONER

## 2024-01-27 PROCEDURE — 99232 SBSQ HOSP IP/OBS MODERATE 35: CPT | Performed by: INTERNAL MEDICINE

## 2024-01-27 PROCEDURE — 83735 ASSAY OF MAGNESIUM: CPT | Performed by: NURSE PRACTITIONER

## 2024-01-27 RX ORDER — POLYETHYLENE GLYCOL 3350 17 G/17G
17 POWDER, FOR SOLUTION ORAL DAILY
Status: DISCONTINUED | OUTPATIENT
Start: 2024-01-27 | End: 2024-01-31

## 2024-01-27 RX ORDER — SENNOSIDES 8.6 MG
2 TABLET ORAL
Status: DISCONTINUED | OUTPATIENT
Start: 2024-01-27 | End: 2024-02-01 | Stop reason: HOSPADM

## 2024-01-27 RX ORDER — VANCOMYCIN HYDROCHLORIDE 1 G/200ML
1000 INJECTION, SOLUTION INTRAVENOUS EVERY 24 HOURS
Status: DISCONTINUED | OUTPATIENT
Start: 2024-01-27 | End: 2024-01-27

## 2024-01-27 RX ADMIN — METOPROLOL TARTRATE 2.5 MG: 1 INJECTION, SOLUTION INTRAVENOUS at 19:04

## 2024-01-27 RX ADMIN — IPRATROPIUM BROMIDE 0.5 MG: 0.5 SOLUTION RESPIRATORY (INHALATION) at 07:14

## 2024-01-27 RX ADMIN — LEVALBUTEROL HYDROCHLORIDE 1.25 MG: 1.25 SOLUTION RESPIRATORY (INHALATION) at 07:14

## 2024-01-27 RX ADMIN — OLANZAPINE 2.5 MG: 2.5 TABLET, FILM COATED ORAL at 19:05

## 2024-01-27 RX ADMIN — METOPROLOL TARTRATE 25 MG: 25 TABLET, FILM COATED ORAL at 20:28

## 2024-01-27 RX ADMIN — OLANZAPINE 2.5 MG: 2.5 TABLET, FILM COATED ORAL at 01:22

## 2024-01-27 RX ADMIN — METOPROLOL TARTRATE 25 MG: 25 TABLET, FILM COATED ORAL at 08:43

## 2024-01-27 RX ADMIN — OLANZAPINE 2.5 MG: 2.5 TABLET, FILM COATED ORAL at 11:21

## 2024-01-27 RX ADMIN — METOPROLOL TARTRATE 2.5 MG: 1 INJECTION, SOLUTION INTRAVENOUS at 00:53

## 2024-01-27 RX ADMIN — APIXABAN 2.5 MG: 2.5 TABLET, FILM COATED ORAL at 08:43

## 2024-01-27 RX ADMIN — VANCOMYCIN HYDROCHLORIDE 1000 MG: 1 INJECTION, SOLUTION INTRAVENOUS at 09:40

## 2024-01-27 RX ADMIN — APIXABAN 2.5 MG: 2.5 TABLET, FILM COATED ORAL at 17:02

## 2024-01-27 RX ADMIN — CEFEPIME 2000 MG: 2 INJECTION, POWDER, FOR SOLUTION INTRAVENOUS at 20:40

## 2024-01-27 RX ADMIN — CEFEPIME 2000 MG: 2 INJECTION, POWDER, FOR SOLUTION INTRAVENOUS at 08:04

## 2024-01-27 RX ADMIN — POLYETHYLENE GLYCOL 3350 17 G: 17 POWDER, FOR SOLUTION ORAL at 09:44

## 2024-01-27 NOTE — ASSESSMENT & PLAN NOTE
At home No O2 at baseline, confirmed with NH.   2/2 PNA. Rx with iv Cefepime and Vancomycin.  MRSA screen negative.  Discontinue vancomycin.  May consider trending procalcitonin.  Follow-up culture data.

## 2024-01-27 NOTE — ASSESSMENT & PLAN NOTE
At baseline patient wanders around in the nursing home but is not restless.  Acute metabolic encephalopathy secondary to multifocal pneumonia.  Continue patient on IV antibiotic.  Follow culture data.  WBC trending down.  Appreciate pulmonary input.

## 2024-01-27 NOTE — ASSESSMENT & PLAN NOTE
A-fib with RVR: Secondary to pneumonia.  Currently on metoprolol 25 mg twice daily, continue Eliquis 2.5 mg twice daily.  On IV metoprolol as needed for heart rate greater than 140.  Continue the same.

## 2024-01-27 NOTE — PROGRESS NOTES
[facility]  Progress Note  Name: Satish Novak I  MRN: [id number]  Unit/Bed#: S -01 I Date of Admission: 1/25/2024   Date of Service: 1/27/2024 I Hospital Day: 2    Assessment/Plan   * Acute hypoxic respiratory failure (HCC)  Assessment & Plan  At home No O2 at baseline, confirmed with NH.   2/2 PNA. Rx with iv Cefepime and Vancomycin.  MRSA screen negative.  Discontinue vancomycin.  May consider trending procalcitonin.  Follow-up culture data.         Metabolic encephalopathy  Assessment & Plan  At baseline patient wanders around in the nursing home but is not restless.  Acute metabolic encephalopathy secondary to multifocal pneumonia.  Continue patient on IV antibiotic.  Follow culture data.  WBC trending down.  Appreciate pulmonary input.    Atrial fibrillation (HCC)  Assessment & Plan  A-fib with RVR: Secondary to pneumonia.  Currently on metoprolol 25 mg twice daily, continue Eliquis 2.5 mg twice daily.  On IV metoprolol as needed for heart rate greater than 140.  Continue the same.    Dementia (HCC)  Assessment & Plan  Continue supportive care.    Sepsis (HCC)  Assessment & Plan  Secondary to multifocal pneumonia.  Improving gradually.  Continue IV antibiotic.  O2 requirement improving also.                 Mobility:     Basic Mobility Inpatient Raw Score: 12  -HLM Goal: 4: Move to chair/commode  JH-HLM Achieved: 2: Bed activities/Dependent transfer  HLM Goal achieved. Continue to encourage appropriate mobility.    Pharmacologic VTE Prophylaxis: Yes Eliquis  Mechanical VTE Prophylaxis in Place: No   Patient Centered Rounds: I have performed bedside rounds with the Nursing staff today.   Current Length of Stay: 2 day(s)  Current Patient Status: Inpatient   Code Status: Level 3 - DNAR and DNI  Time Spent for Care:  35 minutes.  More than 50% of total time spent on counseling and coordination of care as described above.  Discussions with Specialists or Other Care Team Provider:  Yes pulmonary specialist, and recommend continue antibiotics IV.  Education and Discussions with Family / Patient: Yes, Updated family member.  POA/Guardian. Teena  Discharge Plan: 24-48 hrs.   Case Discussed with  regarding updating plan of care and disposition planning.   Certification Statement: The patient will continue to require additional inpatient hospital stay due to metabolic encephalopathy, multifocal pneumonia, acute hypoxic respiratory failure, A-fib with RVR.    Subjective:   I have seen and Examined the patient at the bedside. No CP or Sob. No fevers or chills, No nausea or vomiting. Overnight events reviewed with the RN. No Other complains.  Most of the subjective and review of system was obtained from patient's nurse.  Patient has dementia and is altered and confused and cannot give a good subjective and review of system.  Still continues to be in A-fib with RVR, not hypoxic though.  Restless and requiring restraints.    Review of System:     Objective:   Temp (24hrs), Av.4 °F (36.9 °C), Min:98.3 °F (36.8 °C), Max:98.4 °F (36.9 °C)    Temp:  [98.3 °F (36.8 °C)-98.4 °F (36.9 °C)] 98.3 °F (36.8 °C)  HR:  [] 124  BP: (115-142)/(65-75) 136/70  SpO2:  [97 %-100 %] 98 %  Body mass index is 24.01 kg/m².     Input and Output Summary (last 24 hours):     Intake/Output Summary (Last 24 hours) at 2024 1342  Last data filed at 2024 0857  Gross per 24 hour   Intake 420 ml   Output 650 ml   Net -230 ml     I/O          0701   0700  0701   0700  0701   0700    P.O.  240 420    I.V. (mL/kg) 34.8 (0.5) 1004.6 (13.2)     IV Piggyback 450 440     Total Intake(mL/kg) 484.8 (6.4) 1684.6 (22.2) 420 (5.5)    Urine (mL/kg/hr)  650 (0.4)     Total Output  650     Net +484.8 +1034.6 +420           Unmeasured Urine Occurrence 2 x              Physical Exam:   General : Alert, Awake but confused and oriented x 0, does not appear to be in any acute distress but  "restless.  Neck : Supple.   Eyes:  NOAH, EOMI.   CVS : S1, S2,irregularly irregular heart rhythm.  And tachycardic  R/S : Clear to auscultate anteriorly.  Decreased breath sound the bases however poor inspiratory effort.  Abd: Soft, NT, ND. Bs+ve  Extremity: No pedal edema noted.   Skin: No acute Rash noted.   CNS: No acute FND.     Additional Data:     Labs, Culture & Imaging Data Reviewed:    Results from last 7 days   Lab Units 01/27/24  0633   WBC Thousand/uL 11.04*   HEMOGLOBIN g/dL 13.9   HEMATOCRIT % 42.8   PLATELETS Thousands/uL 95*     Results from last 7 days   Lab Units 01/27/24  0633   POTASSIUM mmol/L 4.2   CHLORIDE mmol/L 109*   CO2 mmol/L 26   BUN mg/dL 28*   CREATININE mg/dL 1.13   CALCIUM mg/dL 8.9   ALK PHOS U/L 53   ALT U/L 9   AST U/L 18     Results from last 7 days   Lab Units 01/25/24  1844   INR  1.05     Lab Results   Component Value Date    HGBA1C 5.8 (H) 04/05/2023      CT chest wo contrast   Final Result by Riana Christy MD (01/26 1644)      No right upper lobe mass. The opacity over the right upper lung on the chest radiograph is due to the nebulizer overlying the patient.      Moderate bilateral lower lobe pneumonia.      Interstitial edema with trace effusions.      Pulmonary artery enlargement which can be seen with pulmonary hypertension.            Workstation performed: [id number]         XR chest 1 view portable   Final Result by Tito Harrison MD (01/26 0824)      Multifocal airspace opacities, suspicious for pneumonia. Recommend follow-up until radiographic clearing.      Trace left and possible trace right pleural effusions.      The study was marked in EPIC for immediate notification.                  Workstation performed: [id number]             Cultures:   Blood Culture:   Lab Results   Component Value Date    BLOODCX No Growth at 24 hrs. 01/25/2024    BLOODCX No Growth at 24 hrs. 01/25/2024     Urine Culture: No results found for: \"URINECX\"  Sputum Culture: No " "components found for: \"SPUTUMCX\"  Wound Culture: No results found for: \"WOUNDCULT\"    Last 24 Hours Medication List:   Current Facility-Administered Medications   Medication Dose Route Frequency Provider Last Rate    apixaban  2.5 mg Oral BID BEBO Galdamez      cefepime  2,000 mg Intravenous Q12H BEBO Galdamez 2,000 mg (01/27/24 0804)    chlorhexidine  15 mL Mouth/Throat Q12H [facility] BEBO Galdamez      metoprolol  2.5 mg Intravenous Q6H PRN Dari Vanessa DO      metoprolol tartrate  25 mg Oral Q12H PROSPER BEBO Galdamez      OLANZapine  2.5 mg Oral Q6H PRN Dari Vanessa DO      polyethylene glycol  17 g Oral Daily Stephen Peralta MD      senna  2 tablet Oral HS Stephen Peralta MD           Patient is at moderate risk for morbidity and mortality due to above mentioned illness and comorbidities.           "

## 2024-01-27 NOTE — PROGRESS NOTES
Satish Novak is a 89 y.o. male who is currently ordered Vancomycin IV with management by the Pharmacy Consult service.  Relevant clinical data and objective / subjective history reviewed.  Vancomycin Assessment:  Indication and Goal AUC/Trough: Pneumonia (goal -600, trough >10)  Clinical Status: stable  Micro:     Renal Function:  SCr: 1.13 mg/dL  CrCl: 38.3 mL/min  Renal replacement: Not on dialysis  Days of Therapy: 2  Current Dose: 750 mg every 24 hours  Vancomycin Plan:  New Dosin mg every 24 hours  Estimated AUC: 482 mcg*hr/mL  Estimated Trough: 15.2 mcg/mL  Next Level:  @ 0600  Renal Function Monitoring: Daily BMP and UOP  Pharmacy will continue to follow closely for s/sx of nephrotoxicity, infusion reactions and appropriateness of therapy.  BMP and CBC will be ordered per protocol. We will continue to follow the patient’s culture results and clinical progress daily.    Marta Bran, Pharmacist

## 2024-01-27 NOTE — ASSESSMENT & PLAN NOTE
Secondary to multifocal pneumonia.  Improving gradually.  Continue IV antibiotic.  O2 requirement improving also.

## 2024-01-27 NOTE — PROGRESS NOTES
"Progress Note - Pulmonary   Satish Novak 89 y.o. male MRN: [id number]  Unit/Bed#: S -01 Encounter: [id number]    Assessment/Plan:    1.  Acute on chronic hypoxic respiratory failure likely multifaceted as listed below        -Currently on home O2 requirements of 4 L 98%        -Nasal prongs were not even noted in nares and his sats were 93%        -Maintain saturations greater than 88%        -Pulmonary toileting: Deep breathing cough out of bed as tolerated incentive spirometry    2.  Multifocal PNA        -Procalcitonin- 14.50--19.90--14.97        -WBC trending down        -Day # 3/7-cefepime        -Negative: MRSA, Legionella, flu RSV        -Will need repeat imaging in 4 to 6 weeks    3.  Suspected acute on chronic diastolic CHF w/ afib        -1/26/2024-echo EF 55%  BNP-365        -Imaging shows vascular congestion/edema        -Overall appears euvolemic at this time        -s/p IV Lasix    4.  Dementia        -Will continue supportive care      -Outpatient follow-up per discharge instruction  -Pulmonary will sign off        Subjective:    Satish was comfortably sitting in his bed.  No significant overnight events reported.  Currently denying any fevers, chills, abscess, headaches, night sweats, pleuritic chest pain, or palpitations.    Objective:    Vitals: Blood pressure 136/70, pulse (!) 124, temperature 98.3 °F (36.8 °C), resp. rate 22, height 5' 10\" (1.778 m), weight 75.9 kg (167 lb 5.3 oz), SpO2 98%.,Body mass index is 24.01 kg/m².      Intake/Output Summary (Last 24 hours) at 1/27/2024 1112  Last data filed at 1/27/2024 0857  Gross per 24 hour   Intake 1864.63 ml   Output 650 ml   Net 1214.63 ml       Invasive Devices       Peripheral Intravenous Line  Duration             Peripheral IV 01/25/24 Left Antecubital 1 day    Peripheral IV 01/25/24 Right Antecubital 1 day              Drain  Duration             External Urinary Catheter 1 day                    Physical Exam:     Physical " "Exam  Constitutional:       General: He is not in acute distress.     Appearance: Normal appearance. He is normal weight. He is not ill-appearing.   HENT:      Head: Normocephalic and atraumatic.      Nose: Nose normal. No congestion or rhinorrhea.      Mouth/Throat:      Mouth: Mucous membranes are dry.      Pharynx: Oropharynx is clear. No oropharyngeal exudate or posterior oropharyngeal erythema.   Cardiovascular:      Rate and Rhythm: Normal rate and regular rhythm.      Pulses: Normal pulses.      Heart sounds: Normal heart sounds. No murmur heard.     No friction rub. No gallop.   Pulmonary:      Effort: Pulmonary effort is normal. No tachypnea, bradypnea, accessory muscle usage or respiratory distress.      Breath sounds: Decreased air movement present. No stridor. Decreased breath sounds and rhonchi present. No wheezing or rales.      Comments: Scattered rhonchi throughout lung fields  Chest:      Chest wall: No tenderness.   Abdominal:      General: Abdomen is flat. Bowel sounds are normal. There is no distension.      Palpations: Abdomen is soft. There is no mass.   Musculoskeletal:         General: No swelling or tenderness. Normal range of motion.      Cervical back: Normal range of motion. No rigidity or tenderness.   Skin:     General: Skin is warm and dry.      Coloration: Skin is not jaundiced or pale.   Neurological:      General: No focal deficit present.      Mental Status: He is alert. Mental status is at baseline. He is disoriented.   Psychiatric:         Mood and Affect: Mood normal.         Behavior: Behavior normal.         Labs: I have personally reviewed pertinent lab results., ABG: No results found for: \"PHART\", \"PSA4QBI\", \"PO2ART\", \"QRN5TYX\", \"Q0BQAQIV\", \"BEART\", \"SOURCE\", BNP: No results found for: \"BNP\", CBC:   Lab Results   Component Value Date    WBC 11.04 (H) 01/27/2024    HGB 13.9 01/27/2024    HCT 42.8 01/27/2024     (H) 01/27/2024    PLT 95 (L) 01/27/2024    RBC 4.14 " "01/27/2024    MCH 33.6 01/27/2024    MCHC 32.5 01/27/2024    RDW 13.5 01/27/2024    MPV 10.8 01/27/2024    NRBC 0 01/27/2024   , CMP:   Lab Results   Component Value Date    SODIUM 140 01/27/2024    K 4.2 01/27/2024     (H) 01/27/2024    CO2 26 01/27/2024    BUN 28 (H) 01/27/2024    CREATININE 1.13 01/27/2024    CALCIUM 8.9 01/27/2024    AST 18 01/27/2024    ALT 9 01/27/2024    ALKPHOS 53 01/27/2024    EGFR 56 01/27/2024   , PT/INR: No results found for: \"PT\", \"INR\"      Imaging and other studies: I have personally reviewed pertinent films in PACS    Chest CT-right upper lung opacity moderate bilateral pneumonia interstitial edema  "

## 2024-01-28 PROCEDURE — 99232 SBSQ HOSP IP/OBS MODERATE 35: CPT | Performed by: INTERNAL MEDICINE

## 2024-01-28 RX ORDER — QUETIAPINE FUMARATE 25 MG/1
12.5 TABLET, FILM COATED ORAL
Status: DISCONTINUED | OUTPATIENT
Start: 2024-01-28 | End: 2024-02-01 | Stop reason: HOSPADM

## 2024-01-28 RX ADMIN — QUETIAPINE FUMARATE 12.5 MG: 25 TABLET ORAL at 20:56

## 2024-01-28 RX ADMIN — CEFEPIME 2000 MG: 2 INJECTION, POWDER, FOR SOLUTION INTRAVENOUS at 09:26

## 2024-01-28 RX ADMIN — METOPROLOL TARTRATE 25 MG: 25 TABLET, FILM COATED ORAL at 20:56

## 2024-01-28 RX ADMIN — POLYETHYLENE GLYCOL 3350 17 G: 17 POWDER, FOR SOLUTION ORAL at 09:24

## 2024-01-28 RX ADMIN — METOPROLOL TARTRATE 2.5 MG: 1 INJECTION, SOLUTION INTRAVENOUS at 19:06

## 2024-01-28 RX ADMIN — OLANZAPINE 2.5 MG: 2.5 TABLET, FILM COATED ORAL at 03:39

## 2024-01-28 RX ADMIN — SENNOSIDES 17.2 MG: 8.6 TABLET, FILM COATED ORAL at 20:56

## 2024-01-28 RX ADMIN — METOPROLOL TARTRATE 25 MG: 25 TABLET, FILM COATED ORAL at 09:24

## 2024-01-28 RX ADMIN — CEFEPIME 2000 MG: 2 INJECTION, POWDER, FOR SOLUTION INTRAVENOUS at 20:56

## 2024-01-28 RX ADMIN — APIXABAN 2.5 MG: 2.5 TABLET, FILM COATED ORAL at 09:24

## 2024-01-28 RX ADMIN — APIXABAN 2.5 MG: 2.5 TABLET, FILM COATED ORAL at 18:23

## 2024-01-28 NOTE — UTILIZATION REVIEW
Date: 1/27/2024    Day 3: Has surpassed a 2nd midnight with active treatments and services, which include ongoing vitals, assessments on med surg level, labs, prn IV medication. See initial review completed by Faviola ROGER RN on 1/26/2024.

## 2024-01-28 NOTE — PROGRESS NOTES
[facility]  Progress Note  Name: Satish Novak I  MRN: [id number]  Unit/Bed#: S -01 I Date of Admission: 1/25/2024   Date of Service: 1/28/2024 I Hospital Day: 3    Assessment/Plan   * Acute hypoxic respiratory failure (HCC)  Assessment & Plan  At home No O2 at baseline, confirmed with NH.   2/2 PNA. Rx with iv Cefepime for now. MRSA screen negative.  Discontinue vancomycin.  May consider trending procalcitonin.           Metabolic encephalopathy  Assessment & Plan  At baseline patient wanders around in the nursing home but is not restless.  Acute metabolic encephalopathy secondary to multifocal pneumonia.  Continue patient on IV antibiotic.  Follow culture data.  WBC trending down.  Appreciate pulmonary input.    Atrial fibrillation (HCC)  Assessment & Plan  A-fib with RVR: Secondary to pneumonia.  Currently on metoprolol 25 mg twice daily, continue Eliquis 2.5 mg twice daily.  On IV metoprolol as needed for heart rate greater than 140.  Continue the same.    Dementia (HCC)  Assessment & Plan   with behavioral disturbance secondary to pneumonia, hypoxic respiratory failure.  And hospital-acquired delirium.  Currently on Zyprexa 2.5 every 6 hours as needed agitation.  May consider adding Seroquel 12.5 mg qHS.     Sepsis (HCC)  Assessment & Plan  Secondary to multifocal pneumonia.  Improving gradually.  Continue IV antibiotic.  O2 requirement improving also.                 Mobility:     Basic Mobility Inpatient Raw Score: 12  JH-HLM Goal: 4: Move to chair/commode  JH-HLM Achieved: 2: Bed activities/Dependent transfer  HLM Goal achieved. Continue to encourage appropriate mobility.    Pharmacologic VTE Prophylaxis: Yes Eliquis. .   Mechanical VTE Prophylaxis in Place: No   Patient Centered Rounds: I have performed bedside rounds with the Nursing staff today.   Current Length of Stay: 3 day(s)  Current Patient Status: Inpatient   Code Status: Level 3 - DNAR and DNI  Time Spent for  Care:  35 minutes.  More than 50% of total time spent on counseling and coordination of care as described above.  Discussions with Specialists or Other Care Team Provider: Yes, Pulmonary Team. Plan to continue patient on IV cefepime.  Initiate total of 7-day course of antibiotic.  Education and Discussions with Family / Patient: Yes, Updated POA/Guardian.   Discharge Plan: 24-48 hrs.   Case Discussed with  regarding updating plan of care and disposition planning.   Certification Statement: The patient will continue to require additional inpatient hospital stay due to ammonia, multifocal pneumonia, sepsis, dementia with behavioral disturbances.    Subjective:   I have seen and Examined the patient at the bedside. No CP or Sob. No fevers or chills, No nausea or vomiting. Overnight events reviewed with the RN. No Other complains.  Patient still continues to remain agitated and restless and required restraints.  However O2 requirement has improved.  Trend is improving as well.    Review of System:   Able to obtain secondary patient's mental status.    Objective:   Temp (24hrs), Av.4 °F (36.9 °C), Min:98 °F (36.7 °C), Max:99 °F (37.2 °C)    Temp:  [98 °F (36.7 °C)-99 °F (37.2 °C)] 98.2 °F (36.8 °C)  HR:  [] 77  Resp:  [24] 24  BP: (115-171)/(74-93) 149/93  SpO2:  [96 %-100 %] 98 %  Body mass index is 24.01 kg/m².     Input and Output Summary (last 24 hours):   No intake or output data in the 24 hours ending 24 1611  I/O          0701   0700  0701   0700  0701   0700    P.O. 240 420     I.V. (mL/kg) 1004.6 (13.2)      IV Piggyback 440      Total Intake(mL/kg) 1684.6 (22.2) 420 (5.5)     Urine (mL/kg/hr) 650 (0.4)      Total Output 650      Net +1034.6 +420                    Physical Exam:   General : Alert, Awake and oriented x 0-1, NAD.  Restless and still on restraints.  Still in restraints.  Neck : Supple.   Eyes:  NOAH, EOMI.   CVS : S1, S2, irregularly  "irregular heart rhythm, tachycardic.   R/S : Clear to auscultate anteriorly.  Decreased breath sound the bases however poor inspiratory effort.  No wheezes appreciated.  Abd: Soft, NT, ND. Bs+ve  Extremity: No pedal edema noted.   Skin: No acute Rash noted.   CNS: No acute FND.     Additional Data:     Labs, Culture & Imaging Data Reviewed:    Results from last 7 days   Lab Units 01/27/24  0633   WBC Thousand/uL 11.04*   HEMOGLOBIN g/dL 13.9   HEMATOCRIT % 42.8   PLATELETS Thousands/uL 95*     Results from last 7 days   Lab Units 01/27/24  0633   POTASSIUM mmol/L 4.2   CHLORIDE mmol/L 109*   CO2 mmol/L 26   BUN mg/dL 28*   CREATININE mg/dL 1.13   CALCIUM mg/dL 8.9   ALK PHOS U/L 53   ALT U/L 9   AST U/L 18     Results from last 7 days   Lab Units 01/25/24  1844   INR  1.05     Lab Results   Component Value Date    HGBA1C 5.8 (H) 04/05/2023      CT chest wo contrast   Final Result by Riana Christy MD (01/26 1644)      No right upper lobe mass. The opacity over the right upper lung on the chest radiograph is due to the nebulizer overlying the patient.      Moderate bilateral lower lobe pneumonia.      Interstitial edema with trace effusions.      Pulmonary artery enlargement which can be seen with pulmonary hypertension.            Workstation performed: [id number]         XR chest 1 view portable   Final Result by Tito Harrison MD (01/26 0824)      Multifocal airspace opacities, suspicious for pneumonia. Recommend follow-up until radiographic clearing.      Trace left and possible trace right pleural effusions.      The study was marked in EPIC for immediate notification.                  Workstation performed: [id number]             Cultures:   Blood Culture:   Lab Results   Component Value Date    BLOODCX No Growth at 48 hrs. 01/25/2024    BLOODCX No Growth at 48 hrs. 01/25/2024     Urine Culture: No results found for: \"URINECX\"  Sputum Culture: No components found for: \"SPUTUMCX\"  Wound Culture: No " "results found for: \"WOUNDCULT\"    Last 24 Hours Medication List:   Current Facility-Administered Medications   Medication Dose Route Frequency Provider Last Rate    apixaban  2.5 mg Oral BID BEBO Galdamez      cefepime  2,000 mg Intravenous Q12H BEBO Galdamez 2,000 mg (01/28/24 0926)    chlorhexidine  15 mL Mouth/Throat Q12H [facility] BEBO Galdamez      metoprolol  2.5 mg Intravenous Q6H PRN Dari Vanessa DO      metoprolol tartrate  25 mg Oral Q12H [facility] BEBO Galdamez      OLANZapine  2.5 mg Oral Q6H PRN Dari Vanessa DO      polyethylene glycol  17 g Oral Daily Stephen Peralta MD      QUEtiapine  12.5 mg Oral HS Stephen Peralta MD      senna  2 tablet Oral HS Stephen Peralta MD           Patient is at moderate risk for morbidity and mortality due to above mentioned illness and comorbidities.           "

## 2024-01-28 NOTE — ASSESSMENT & PLAN NOTE
At home No O2 at baseline, confirmed with NH.   2/2 PNA. Rx with iv Cefepime for now. MRSA screen negative.  Discontinue vancomycin.  May consider trending procalcitonin.

## 2024-01-28 NOTE — PROGRESS NOTES
Vancomycin IV Pharmacy-to-Dose Consultation     Vancomycin has been discontinued.  Pharmacy will sign off.  Please contact or re-consult with questions.    Brent Calvillo, Pharmacist

## 2024-01-28 NOTE — ASSESSMENT & PLAN NOTE
with behavioral disturbance secondary to pneumonia, hypoxic respiratory failure.  And hospital-acquired delirium.  Currently on Zyprexa 2.5 every 6 hours as needed agitation.  May consider adding Seroquel 12.5 mg qHS.

## 2024-01-29 PROBLEM — J96.01 ACUTE HYPOXIC RESPIRATORY FAILURE (HCC): Status: RESOLVED | Noted: 2024-01-25 | Resolved: 2024-01-29

## 2024-01-29 PROBLEM — J18.9 MULTIFOCAL PNEUMONIA: Status: ACTIVE | Noted: 2024-01-29

## 2024-01-29 PROBLEM — H91.90 HARD OF HEARING: Status: ACTIVE | Noted: 2024-01-29

## 2024-01-29 PROBLEM — G47.00 INSOMNIA: Status: ACTIVE | Noted: 2024-01-29

## 2024-01-29 LAB
ANION GAP SERPL CALCULATED.3IONS-SCNC: 6 MMOL/L
BASOPHILS # BLD AUTO: 0.05 THOUSANDS/ÂΜL (ref 0–0.1)
BASOPHILS NFR BLD AUTO: 1 % (ref 0–1)
BUN SERPL-MCNC: 29 MG/DL (ref 5–25)
CALCIUM SERPL-MCNC: 9.3 MG/DL (ref 8.4–10.2)
CHLORIDE SERPL-SCNC: 107 MMOL/L (ref 96–108)
CO2 SERPL-SCNC: 26 MMOL/L (ref 21–32)
CREAT SERPL-MCNC: 1.29 MG/DL (ref 0.6–1.3)
EOSINOPHIL # BLD AUTO: 0.15 THOUSAND/ÂΜL (ref 0–0.61)
EOSINOPHIL NFR BLD AUTO: 2 % (ref 0–6)
ERYTHROCYTE [DISTWIDTH] IN BLOOD BY AUTOMATED COUNT: 13.3 % (ref 11.6–15.1)
GFR SERPL CREATININE-BSD FRML MDRD: 47 ML/MIN/1.73SQ M
GLUCOSE SERPL-MCNC: 96 MG/DL (ref 65–140)
HCT VFR BLD AUTO: 48.7 % (ref 36.5–49.3)
HGB BLD-MCNC: 15.9 G/DL (ref 12–17)
IMM GRANULOCYTES # BLD AUTO: 0.04 THOUSAND/UL (ref 0–0.2)
IMM GRANULOCYTES NFR BLD AUTO: 1 % (ref 0–2)
LYMPHOCYTES # BLD AUTO: 1.7 THOUSANDS/ÂΜL (ref 0.6–4.47)
LYMPHOCYTES NFR BLD AUTO: 20 % (ref 14–44)
MCH RBC QN AUTO: 33.5 PG (ref 26.8–34.3)
MCHC RBC AUTO-ENTMCNC: 32.6 G/DL (ref 31.4–37.4)
MCV RBC AUTO: 103 FL (ref 82–98)
MONOCYTES # BLD AUTO: 1.01 THOUSAND/ÂΜL (ref 0.17–1.22)
MONOCYTES NFR BLD AUTO: 12 % (ref 4–12)
NEUTROPHILS # BLD AUTO: 5.56 THOUSANDS/ÂΜL (ref 1.85–7.62)
NEUTS SEG NFR BLD AUTO: 64 % (ref 43–75)
NRBC BLD AUTO-RTO: 0 /100 WBCS
PLATELET # BLD AUTO: 118 THOUSANDS/UL (ref 149–390)
PMV BLD AUTO: 10.6 FL (ref 8.9–12.7)
POTASSIUM SERPL-SCNC: 4.7 MMOL/L (ref 3.5–5.3)
PROCALCITONIN SERPL-MCNC: 4.93 NG/ML
RBC # BLD AUTO: 4.74 MILLION/UL (ref 3.88–5.62)
SODIUM SERPL-SCNC: 139 MMOL/L (ref 135–147)
TSH SERPL DL<=0.05 MIU/L-ACNC: 1.56 UIU/ML (ref 0.45–4.5)
WBC # BLD AUTO: 8.51 THOUSAND/UL (ref 4.31–10.16)

## 2024-01-29 PROCEDURE — 99232 SBSQ HOSP IP/OBS MODERATE 35: CPT | Performed by: PHYSICIAN ASSISTANT

## 2024-01-29 PROCEDURE — 99223 1ST HOSP IP/OBS HIGH 75: CPT | Performed by: FAMILY MEDICINE

## 2024-01-29 PROCEDURE — 84443 ASSAY THYROID STIM HORMONE: CPT | Performed by: FAMILY MEDICINE

## 2024-01-29 PROCEDURE — 84145 PROCALCITONIN (PCT): CPT | Performed by: INTERNAL MEDICINE

## 2024-01-29 PROCEDURE — 85025 COMPLETE CBC W/AUTO DIFF WBC: CPT | Performed by: INTERNAL MEDICINE

## 2024-01-29 PROCEDURE — 80048 BASIC METABOLIC PNL TOTAL CA: CPT | Performed by: INTERNAL MEDICINE

## 2024-01-29 RX ORDER — ACETAMINOPHEN 325 MG/1
650 TABLET ORAL 3 TIMES DAILY
Status: DISCONTINUED | OUTPATIENT
Start: 2024-01-29 | End: 2024-02-01 | Stop reason: HOSPADM

## 2024-01-29 RX ORDER — DILTIAZEM HYDROCHLORIDE 5 MG/ML
10 INJECTION INTRAVENOUS ONCE
Status: COMPLETED | OUTPATIENT
Start: 2024-01-29 | End: 2024-01-29

## 2024-01-29 RX ADMIN — ACETAMINOPHEN 650 MG: 325 TABLET, FILM COATED ORAL at 22:34

## 2024-01-29 RX ADMIN — METOPROLOL TARTRATE 2.5 MG: 1 INJECTION, SOLUTION INTRAVENOUS at 15:06

## 2024-01-29 RX ADMIN — DILTIAZEM HYDROCHLORIDE 10 MG: 5 INJECTION INTRAVENOUS at 18:09

## 2024-01-29 RX ADMIN — QUETIAPINE FUMARATE 12.5 MG: 25 TABLET ORAL at 22:34

## 2024-01-29 RX ADMIN — APIXABAN 2.5 MG: 2.5 TABLET, FILM COATED ORAL at 09:11

## 2024-01-29 RX ADMIN — CEFEPIME 2000 MG: 2 INJECTION, POWDER, FOR SOLUTION INTRAVENOUS at 20:21

## 2024-01-29 RX ADMIN — POLYETHYLENE GLYCOL 3350 17 G: 17 POWDER, FOR SOLUTION ORAL at 09:10

## 2024-01-29 RX ADMIN — ACETAMINOPHEN 650 MG: 325 TABLET, FILM COATED ORAL at 16:30

## 2024-01-29 RX ADMIN — SENNOSIDES 17.2 MG: 8.6 TABLET, FILM COATED ORAL at 22:34

## 2024-01-29 RX ADMIN — CEFEPIME 2000 MG: 2 INJECTION, POWDER, FOR SOLUTION INTRAVENOUS at 07:58

## 2024-01-29 RX ADMIN — METOPROLOL TARTRATE 25 MG: 25 TABLET, FILM COATED ORAL at 09:10

## 2024-01-29 RX ADMIN — APIXABAN 2.5 MG: 2.5 TABLET, FILM COATED ORAL at 17:33

## 2024-01-29 NOTE — ASSESSMENT & PLAN NOTE
Patient does not use any assistive device for ambulation at baseline  Per nursing staff at the facility he had a few recent falls with no major injury  Monitor orthostatic vital signs  Encourage p.o. hydration  Avoid hypotension and hypoglycemia   PT OT to follow

## 2024-01-29 NOTE — CONSULTS
Consultation - Geriatric Medicine   Satish Novak 89 y.o. male MRN: [id number]  Unit/Bed#: S -01 Encounter: [id number]      Assessment/Plan     Hard of hearing  Assessment & Plan  Use hearing amplifier  Speak loud and clear    Insomnia  Assessment & Plan  Add melatonin 3 mg nightly  Avoid nighttime interruptions and caffeine use in the afternoon  Avoid sedative hypnotics    Metabolic encephalopathy  Assessment & Plan    -Patient is high risk of delirium due to dementia at baseline, pneumonia, change in environment, metabolic imbalance, cefepime  -Continue delirium precautions  -maintain normal sleep/wake cycle, add melatonin 3 mg qhs  -minimize overnight interruptions, group overnight vitals/labs/nursing checks as possible  -dim lights, close blinds and turn off tv to minimize stimulation and encourage sleep environment in evenings  -ensure that pain is well controlled  consider Tylenol 650 mg 3 times a day  -monitor for fecal and urinary retention which may precipitate delirium  -encourage early mobilization and ambulation  -provide frequent reorientation and redirection  -encourage family and friends at the bedside to help calm patient if anxious  -avoid medications which may precipitate or worsen delirium such as tramadol, benzodiazepine, anticholinergics, and antihistaminics  -encourage hydration and nutrition , assist with feeding if needed  -redirect unwanted behaviors as first line, avoid physical restraints.   -Consult PT OT  -Continue Seroquel 12.5 mg nightly-monitor Qtc  -observe without physical restraints  -consider gabapentin 100 mg p.o. twice daily  -For severe agitation may use Zyprexa 2.5 mg every 8 hours intramuscular    Atrial fibrillation (HCC)  Assessment & Plan  Currently rate controlled  Continue metoprolol and anticoagulation with Eliquis  Monitor closely  Monitor electrolytes    Dementia (HCC)  Assessment & Plan  Patient has advanced dementia with behavioral disturbance at baseline  He  lives in a dementia unit distance with all IADLs and some ADLs  Will continue to provide supportive care, reorient as needed.  Patient is at high risk for delirium, will monitor closely and place on delirium precautions.  Maintain sleep/wake cycle.  Optimize pain regimen.  Monitor for constipation and urinary retention and manage as needed.  Check B12 level and TSH  Encourage family to visit.  Encourage to wear glasses and hearing aids while awake.  Encourage po intake, assist with feeding if needed.     Fall  Assessment & Plan  Patient does not use any assistive device for ambulation at baseline  Per nursing staff at the facility he had a few recent falls with no major injury  Monitor orthostatic vital signs  Encourage p.o. hydration  Avoid hypotension and hypoglycemia   Consult PT OT    * Multifocal pneumonia  Assessment & Plan      Currently on cefepime monitor renal function and adjust dose as needed  Out of bed as tolerated and consult PT OT  Encourage incentive spirometry  Pulmonology follows appreciate input  CT chest on admission showed no right upper lobe mass. The opacity over the right upper lung on the chest radiograph is due to the nebulizer overlying the patient. Moderate bilateral lower lobe pneumonia. Interstitial edema with trace effusions. Pulmonary artery enlargement which can be seen with pulmonary hypertension.              History of Present Illness   Physician Requesting Consult: Stephen Peralta MD  Reason for Consult / Principal Problem: Acute encephalopathy  Hx and PE limited by: dementia  Additional history obtained from: nursing staff at [facility]      HPI: Satish Novak is a 89 y.o. year old male who presented to the hospital with change in mental status and hypoxia.  He was found to have pneumonia and placed on antibiotic treatment.  Currently respiratory status improved patient able to saturate well on room air, kamari afebrile white blood count trending down.  Patient has advanced  dementia at baseline and he lives in dementia unit at [facility].  Per nursing staff at the facility patient is ambulating independently had a few falls recently without major injury.  He needs assistance with all IADLs and some ADLs.  Patient is found to wander once in a while and he can get agitated at times, however he can be redirected. also noted to have insomnia.  At the time of encounter patient response to name, able to follow few simple commands, seems comfortable.  Per nursing staff he was able to eat with assistance today.  No behaviors reported today and he was able to sleep overnight.    Inpatient consult to Gerontology  Consult performed by: Alissa Hicks MD  Consult ordered by: Axel Giron PA-C          Review of Systems   Unable to perform ROS: Dementia           Historical Information   Past Medical History:   Diagnosis Date    Anxiety     Atrial fibrillation (HCC)     Dementia (HCC)     Disorder of kidney and ureter     Dupuytren's disease     Fibromatosis     Hyperlipidemia     Hypertension     PAF (paroxysmal atrial fibrillation) (HCC)     Thrombocytopenia (HCC)     Tinea unguium     Vascular disease      History reviewed. No pertinent surgical history.  Social History   Social History     Substance and Sexual Activity   Alcohol Use Not Currently     Social History     Substance and Sexual Activity   Drug Use Never     Social History     Tobacco Use   Smoking Status Never    Passive exposure: Never   Smokeless Tobacco Never     Family History:   Family History   Problem Relation Age of Onset    No Known Problems Mother     No Known Problems Father        Meds/Allergies   current meds:   Current Facility-Administered Medications   Medication Dose Route Frequency    acetaminophen (TYLENOL) tablet 650 mg  650 mg Oral TID    apixaban (ELIQUIS) tablet 2.5 mg  2.5 mg Oral BID    ceFEPime (MAXIPIME) 2,000 mg in dextrose 5 % 50 mL IVPB  2,000 mg Intravenous Q12H    chlorhexidine (PERIDEX) 0.12 % oral  rinse 15 mL  15 mL Mouth/Throat Q12H PROSPER    metoprolol (LOPRESSOR) injection 2.5 mg  2.5 mg Intravenous Q6H PRN    metoprolol tartrate (LOPRESSOR) tablet 25 mg  25 mg Oral Q12H PROSPER    OLANZapine (ZyPREXA) tablet 2.5 mg  2.5 mg Oral Q6H PRN    polyethylene glycol (MIRALAX) packet 17 g  17 g Oral Daily    QUEtiapine (SEROquel) tablet 12.5 mg  12.5 mg Oral HS    senna (SENOKOT) tablet 17.2 mg  2 tablet Oral HS     Home medications    Vitamin D 2000 units daily  Eliquis 2.5 mg twice daily  Toprol 25 mg twice daily  Tylenol as needed for pain  MiraLAX as needed for indigestion  Imodium as needed for diarrhea    Allergies   Allergen Reactions    Levaquin [Levofloxacin] Other (See Comments)     unknown       Objective     Intake/Output Summary (Last 24 hours) at 1/29/2024 1425  Last data filed at 1/29/2024 1201  Gross per 24 hour   Intake 221 ml   Output 496 ml   Net -275 ml     Invasive Devices       Peripheral Intravenous Line  Duration             Peripheral IV 01/29/24 Dorsal (posterior);Right Wrist <1 day              Drain  Duration             External Urinary Catheter 3 days                    Physical Exam  Vitals and nursing note reviewed.   Constitutional:       General: He is not in acute distress.     Appearance: He is well-developed.   HENT:      Head: Normocephalic and atraumatic.      Ears:      Comments: [place]     Mouth/Throat:      Mouth: Mucous membranes are dry.   Eyes:      Conjunctiva/sclera: Conjunctivae normal.   Cardiovascular:      Rate and Rhythm: Normal rate. Rhythm irregular.      Heart sounds: No murmur heard.  Pulmonary:      Effort: Pulmonary effort is normal. No respiratory distress.      Breath sounds: Normal breath sounds.   Abdominal:      Palpations: Abdomen is soft.      Tenderness: There is no abdominal tenderness.   Musculoskeletal:         General: No swelling.      Cervical back: Neck supple.      Right lower leg: No edema.      Left lower leg: No edema.   Skin:     General: Skin is  warm and dry.      Capillary Refill: Capillary refill takes less than 2 seconds.   Neurological:      Mental Status: He is alert. He is disoriented.      Comments: Responds to name   Psychiatric:      Comments: Agitated at times  Waist and wrist restraints         Lab Results:   I have personally reviewed pertinent lab results including the following:  - Cbc CMP    I have personally reviewed the following imaging study reports in PACS:  - CT chest      Therapies:   PT: Evaluation pending    VTE Prophylaxis: eliquis    Code Status: Level 3 - DNAR and DNI  Advance Directive and Living Will:      Power of :    POLST:      Family and Social Support:   Patient lives at [facility] in a dementia unit        Thank you for allowing me to participate in your patients' care. Please do not hesitate to call with any additional questions.  Alissa Hicks MD

## 2024-01-29 NOTE — ASSESSMENT & PLAN NOTE
Currently rate controlled.   Continue metoprolol and anticoagulation with Eliquis  Monitor closely  Monitor electrolytes  Manage as per primary team

## 2024-01-29 NOTE — ASSESSMENT & PLAN NOTE
CT scan with moderate BL lower lobe pneumonia  IV cefepime day 4/7  See assessment and plan under sepsis

## 2024-01-29 NOTE — ASSESSMENT & PLAN NOTE
With behavioral disturbance secondary to pneumonia, hypoxic respiratory failure.  And hospital-acquired delirium.  Currently on Zyprexa 2.5 every 6 hours as needed agitation.  Geriatrics consultation, appreciate recomendations

## 2024-01-29 NOTE — UTILIZATION REVIEW
NOTIFICATION OF INPATIENT ADMISSION   AUTHORIZATION REQUEST   SERVICING FACILITY:   [place] [address]  Tax ID: [id number]  NPI: [id number]   ATTENDING PROVIDER:  Attending Name and NPI#: Stephen Peralta Md [[id number]]  Address: [address] [place] [address]  Phone: [phone]     ADMISSION INFORMATION:  Place of Service: Inpatient [facility]  Place of Service Code: 21  Inpatient Admission Date/Time: 1/25/24  9:15 PM  Discharge Date/Time: No discharge date for patient encounter.  Admitting Diagnosis Code/Description:  Atrial fibrillation (HCC) [I48.91]  Pneumonia [J18.9]  Dyspnea [R06.00]  Hypoxia [R09.02]  Severe sepsis (HCC) [A41.9, R65.20]     UTILIZATION REVIEW CONTACT:  Griffin Roche Utilization   Network Utilization Review Department  Phone: [phone]  Fax: [phone]  Email: [email or web address]  Contact for approvals/pending authorizations, clinical reviews, and discharge.     PHYSICIAN ADVISORY SERVICES:  Medical Necessity Denial & Dfpk-ad-Maly Review  Phone: [phone]  Fax: [phone]  Email: [email or web address]     DISCHARGE SUPPORT TEAM:  For Patients Discharge Needs & Updates  Phone: [phone] opt. [phone] Fax: [phone]  Email: [email or web address]           Faviola Marion RN  Registered Nurse  Specialty: Utilization Review     Utilization Review      Signed     Date of Service: 1/26/2024 11:12 AM     Signed         Initial Clinical Review     Admission: Date/Time/Statement:   Admission Orders (From admission, onward)          Ordered         01/25/24 2115   INPATIENT ADMISSION  Once                                     Orders Placed This Encounter   Procedures    INPATIENT ADMISSION       Standing Status:   Standing       Number of Occurrences:   1       Order Specific Question:   Level of Care       Answer:   Critical Care [15]       Order Specific Question:    Estimated length of stay       Answer:   Not Applicable      ED Arrival Information         Expected   -    Arrival   1/25/2024 18:28    Acuity   Emergent                 Means of arrival   Ambulance    Escorted by   [facility]    Service   Hospitalist    Admission type   Emergency                 Arrival complaint   A-fib - EMS TWSP                     Chief Complaint   Patient presents with    Shortness of Breath       Arrives via EMS from [facility]. Staff called for SOB and decreased oxygen levels. Oxygen saturation in the 80s on 4L NC, which is chronic. EMS started patient on 15L NRB. Patient newly agitated. Hx of afib, with rapid HR on the monitor in triage.         Initial Presentation: 89 y.o. male presents to ED via  EMS  from [facility] after being found hypoxic. Placed on  BiPap in ED for increased WOB and given cardizem for Afib/RVR. CXR suspicious for  PNA and given ALTAGRACIA>   Met sepsis criteria in ED.  PMH  is Afib, dementia.  Admit  Ip Stepdown LOC  with Acute respiratory failure with hypoxia,  Sepsis, Afib, likely  D/T  sepsis and plan is  15 L  NRB,  monitor labs, ALTAGRACIA, dementia precautions and continue  current meds.          Date:   1/26   Day 2:   Denies  SOB, has  wet sounding cough, non productive.  Continue  O@  to keep sats  >  92  %, currently on  4 L NC, can wean as tolerated.  Continue  ALTAGRACIA.   Need strict I & O.  Need CT  chest.   Monitor labs.   Monitor  BP.                 ED Triage Vitals   Temperature Pulse Respirations Blood Pressure SpO2   01/25/24 1832 01/25/24 1832 01/25/24 1832 01/25/24 1835 01/25/24 1832   99.4 °F (37.4 °C) (!) 171 (!) 24 (!) 195/90 93 %       Temp Source Heart Rate Source Patient Position - Orthostatic VS BP Location FiO2 (%)   01/25/24 1832 01/25/24 1832 01/25/24 1832 01/25/24 1832 --   Oral Monitor Sitting Right arm         Pain Score           01/26/24 0000           No Pain                  Wt Readings from Last 1 Encounters:   01/26/24 75.8 kg (167 lb 1.7 oz)       Additional Vital Signs:   1/26/24 0400 -- 71 17 92/46 Abnormal  67 97 % -- -- -- -- --   01/26/24 0300 -- 72 19 88/58 Abnormal  67 97 % -- -- -- -- --   01/26/24 0233 -- 76 19 85/44 Abnormal  61 Abnormal  95 % -- -- -- -- --   01/26/24 0200 98.4 °F (36.9 °C) -- -- -- -- -- -- -- -- -- --   01/26/24 0103 -- 76 25 Abnormal  101/57 75 97 % -- -- -- -- --   01/26/24 0100 -- 81 46 Abnormal  88/56 Abnormal  67 95 % -- -- -- -- --   01/26/24 0000 -- 77 20 95/54 72 97 % -- -- -- -- --   01/25/24 2300 -- 79 21 103/57 76 99 % -- -- -- -- --   01/25/24 2200 -- 100 20 100/50 71 97 % -- -- -- Full face mask --   01/25/24 2144 -- 91 20 102/51 -- 95 % -- -- BiPAP -- --   01/25/24 2130 -- 96 28 Abnormal  95/52 70 96 % -- -- -- -- --   01/25/24 2115 -- 97 26 Abnormal  105/56 76 95 % -- -- BiPAP -- --   01/25/24 2104 -- 112 Abnormal  27 Abnormal  111/79 -- 95 % -- -- BiPAP -- Sitting   01/25/24 2057 101.1 °F (38.4 °C) Abnormal  -- -- -- -- -- -- -- -- -- --   01/25/24 2030 -- 100 -- 107/75 86 97 % -- -- BiPAP -- Sitting   01/25/24 2021 -- 94 -- 98/67 78 96 % -- -- BiPAP -- Sitting   01/25/24 2015 -- 98 26 Abnormal  119/57 82 95 % -- -- -- -- --   01/25/24 2010 -- -- -- -- -- 96 % -- -- -- -- --   01/25/24 2000 -- 101 30 Abnormal  120/63 86 94 % -- -- -- -- --   01/25/24 1945 -- 98 26 Abnormal  129/59 85 95 % -- -- BiPAP -- --   01/25/24 1930 -- 109 Abnormal  35 Abnormal  121/71 93 94 % -- -- BiPAP -- Sitting   01/25/24 1915 -- 101 33 Abnormal  118/69 81 96 % -- -- -- -- --   01/25/24 1911 -- -- -- -- -- -- -- -- BiPAP -- --   01/25/24 1903 -- 99 -- -- -- -- -- -- -- -- --   01/25/24 1902 -- 87 30 Abnormal  124/55 -- 94 % -- -- BiPAP -- Sitting   01/25/24 1855 -- 93 -- -- -- -- -- -- -- -- --   01/25/24 1847 -- 162 Abnormal  24 Abnormal  180/89 Abnormal  128 97 % -- -- BiPAP -- Sitting   01/25/24 1844 -- -- -- -- -- -- -- -- -- Face mask --   01/25/24 1840 97.4 °F (36.3 °C) Abnormal  -- -- -- -- -- -- -- -- -- --   01/25/24 1835 --  -- -- 195/90 Abnormal  -- -- -- -- -- -- --   01/25/24 1832 99.4 °F (37.4 °C) 171 Abnormal  24 Abnormal  -- -- 93 % 44 6 L/min Nasal cannula -- Sitting      Pertinent Labs/Diagnostic Test Results:   EKG  Afib/RVR       XR chest 1 view portable   Final Result by Tito Harrison MD (01/26 0824)       Multifocal airspace opacities, suspicious for pneumonia. Recommend follow-up until radiographic clearing.       Trace left and possible trace right pleural effusions.       The study was marked in EPIC for immediate notification.                       Workstation performed: [id number]           CT chest wo contrast    (Results Pending)           Results from last 7 days   Lab Units 01/25/24  1845   SARS-COV-2   Negative            Results from last 7 days   Lab Units 01/26/24 0451 01/25/24  1844   WBC Thousand/uL 19.09* 14.44*   HEMOGLOBIN g/dL 13.2 16.1   HEMATOCRIT % 40.4 49.4*   PLATELETS Thousands/uL 95* 145*   NEUTROS ABS Thousands/µL 16.59* 10.62*                Results from last 7 days   Lab Units 01/26/24  0451 01/25/24  1844   SODIUM mmol/L 139 138   POTASSIUM mmol/L 4.9 4.7   CHLORIDE mmol/L 108 105   CO2 mmol/L 22 24   ANION GAP mmol/L 9 9   BUN mg/dL 37* 36*   CREATININE mg/dL 1.41* 1.37*   EGFR ml/min/1.73sq m 42 44   CALCIUM mg/dL 8.5 9.5   CALCIUM, IONIZED mmol/L 1.09*  --    MAGNESIUM mg/dL 3.2* 1.8*   PHOSPHORUS mg/dL 3.9  --             Results from last 7 days   Lab Units 01/26/24  0451 01/25/24  1844   AST U/L 15 21   ALT U/L 9 13   ALK PHOS U/L 46 70   TOTAL PROTEIN g/dL 5.6* 7.4   ALBUMIN g/dL 3.2* 4.3   TOTAL BILIRUBIN mg/dL 1.23* 0.82                Results from last 7 days   Lab Units 01/26/24  0451 01/25/24  1844   GLUCOSE RANDOM mg/dL 114 117                Results from last 7 days   Lab Units 01/25/24 2047 01/25/24  1845   PH JEN   7.415* 7.353   PCO2 JEN mm Hg 34.5* 38.6*   PO2 JEN mm Hg 51.6* 40.3   HCO3 JEN mmol/L 21.6* 21.0*   BASE EXC JEN mmol/L -2.1 -4.0   O2 CONTENT JEN ml/dL 18.0  17.1   O2 HGB, VENOUS % 83.0* 69.6                     Results from last 7 days   Lab Units 01/25/24 2251 01/25/24 2047 01/25/24 1844   HS TNI 0HR ng/L  --   --  11   HS TNI 2HR ng/L  --  13  --    HSTNI D2 ng/L  --  2  --    HS TNI 4HR ng/L 17  --   --    HSTNI D4 ng/L 6  --   --            Results from last 7 days   Lab Units 01/25/24 1844   D-DIMER QUANTITATIVE ug/ml FEU 0.88*           Results from last 7 days   Lab Units 01/25/24 1844   PROTIME seconds 14.3   INR   1.05   PTT seconds 28                Results from last 7 days   Lab Units 01/26/24  0451 01/25/24 2340   PROCALCITONIN ng/ml 19.90* 14.50*            Results from last 7 days   Lab Units 01/25/24 2047 01/25/24 1844   LACTIC ACID mmol/L 1.3 3.5*                   Results from last 7 days   Lab Units 01/25/24 1844   BNP pg/mL 365*                           Results from last 7 days   Lab Units 01/25/24 1844   LIPASE u/L 36                       Results from last 7 days   Lab Units 01/25/24 1959   CLARITY UA   Clear   COLOR UA   Light Yellow   SPEC GRAV UA   1.014   PH UA   5.5   GLUCOSE UA mg/dl Negative   KETONES UA mg/dl Negative   BLOOD UA   Negative   PROTEIN UA mg/dl Negative   NITRITE UA   Negative   BILIRUBIN UA   Negative   UROBILINOGEN UA (BE) mg/dl <2.0   LEUKOCYTES UA   Negative             Results from last 7 days   Lab Units 01/25/24 2047 01/25/24 1954 01/25/24 1845   STREP PNEUMONIAE ANTIGEN, URINE    --  Negative  --    LEGIONELLA URINARY ANTIGEN   Negative  --   --    INFLUENZA A PCR    --   --  Negative   INFLUENZA B PCR    --   --  Negative   RSV PCR    --   --  Negative                                    Results from last 7 days   Lab Units 01/25/24 1844 01/25/24 1842   BLOOD CULTURE   Received in Microbiology Lab. Culture in Progress. Received in Microbiology Lab. Culture in Progress.                     ED Treatment:   Medication Administration from 01/25/2024 1828 to 01/25/2024 2227           Date/Time Order Dose  Route Action Comments       01/25/2024 1903 EST diltiazem (CARDIZEM) 125 mg in sodium chloride 0.9 % 125 mL infusion 5 mg/hr Intravenous New Bag --       01/25/2024 1848 EST diltiazem (CARDIZEM) injection 20 mg 20 mg Intravenous Given --       01/25/2024 2024 EST ceftriaxone (ROCEPHIN) 1 g/50 mL in dextrose IVPB 0 mg Intravenous Stopped --       01/25/2024 1955 EST ceftriaxone (ROCEPHIN) 1 g/50 mL in dextrose IVPB 1,000 mg Intravenous New Bag --       01/25/2024 2130 EST azithromycin (ZITHROMAX) 500 mg in sodium chloride 0.9% 250mL IVPB 500 mg 0 mg Intravenous Stopped --       01/25/2024 2023 EST azithromycin (ZITHROMAX) 500 mg in sodium chloride 0.9% 250mL IVPB 500 mg 500 mg Intravenous New Bag --       01/25/2024 2009 EST ipratropium-albuterol (DUO-NEB) 0.5-2.5 mg/3 mL inhalation solution 3 mL 3 mL Nebulization Given --       01/25/2024 2049 EST magnesium sulfate 2 g/50 mL IVPB (premix) 2 g 0 g Intravenous Stopped --       01/25/2024 2014 EST magnesium sulfate 2 g/50 mL IVPB (premix) 2 g 2 g Intravenous New Bag --       01/25/2024 2015 EST furosemide (LASIX) injection 20 mg 20 mg Intravenous Given --       01/25/2024 2125 EST acetaminophen (Ofirmev) injection 1,000 mg 0 mg Intravenous Stopped --       01/25/2024 2109 EST acetaminophen (Ofirmev) injection 1,000 mg 1,000 mg Intravenous New Bag --                Present on Admission:   Sepsis (HCC)   Dementia (HCC)        Admitting Diagnosis: Atrial fibrillation (HCC) [I48.91]  Pneumonia [J18.9]  Dyspnea [R06.00]  Hypoxia [R09.02]  Severe sepsis (HCC) [A41.9, R65.20]  Age/Sex: 89 y.o. male  Admission Orders:  Scheduled Medications:  apixaban, 2.5 mg, Oral, BID  cefepime, 2,000 mg, Intravenous, Q12H  chlorhexidine, 15 mL, Mouth/Throat, Q12H PROSPER  ipratropium, 0.5 mg, Nebulization, TID  levalbuterol, 1.25 mg, Nebulization, TID  metoprolol tartrate, 25 mg, Oral, Q12H PROSPER  vancomycin, 25 mg/kg, Intravenous, Once  [START ON 1/27/2024] vancomycin, 750 mg, Intravenous,  Q24H        Continuous IV Infusions:  PRN Meds:     IP CONSULT TO CASE MANAGEMENT  IP CONSULT TO PHARMACY     Network Utilization Review Department  ATTENTION: Please call with any questions or concerns to [phone] and carefully listen to the prompts so that you are directed to the right person. All voicemails are confidential.   For Discharge needs, contact Care Management DC Support Team at [phone] opt. 2  Send all requests for admission clinical reviews, approved or denied determinations and any other requests to dedicated fax number below belonging to the [facility] where the patient is receiving treatment. List of dedicated fax numbers for the Facilities:  FACILITY NAME UR FAX NUMBER   ADMISSION DENIALS (Administrative/Medical Necessity) [phone]   DISCHARGE SUPPORT TEAM (NETWORK) [phone]   PARENT CHILD HEALTH (Maternity/NICU/Pediatrics) [phone]   [facility] [phone]   [facility] [phone]   formerly [facility] [phone]   [facility] [phone]   [facility] [phone]   [facility] [phone]   [facility] [phone]   [facility] [phone]   [facility] [phone]   [facility] [phone]   [facility] [phone]   [facility] [phone]                            Anette Dodson RN  Registered Nurse  Specialty: Utilization Review     Utilization Review      Signed     Date of Service: 1/27/2024  4:38 PM     Signed         Date: 1/27/2024    Day 3: Has surpassed a 2nd midnight with active treatments and services, which include ongoing vitals, assessments on med surg level, labs, prn IV medication. See initial review completed by Faviola  Doni ROGER RN on 1/26/2024.

## 2024-01-29 NOTE — ASSESSMENT & PLAN NOTE
At baseline patient wanders around in the nursing home but is not restless.  Acute metabolic encephalopathy secondary to multifocal pneumonia.  Continue patient on IV cefepime, day 4/7  Blood cultures negative at 72 hours  WBC trending down.    Appreciate pulmonary input.

## 2024-01-29 NOTE — QUICK NOTE
Called for sustained HR in 140s by nursing - Yamile Kim. Given lopressor at 3pm.ordered one dose of cardizem . If continued HR > 110 may need gtt.

## 2024-01-29 NOTE — ASSESSMENT & PLAN NOTE
POA, A/E/B tachycardia, leukocytosis  Secondary to multifocal pneumonia.  Continue IV Cefepime, day 4/7  Blood culture negative at 72 hours  Legionella dn strep pneumo antigens negative

## 2024-01-29 NOTE — ASSESSMENT & PLAN NOTE
-Patient is high risk of delirium due to dementia at baseline, pneumonia, change in environment, metabolic imbalance, cefepime  -Continue delirium precautions  -maintain normal sleep/wake cycle, continue melatonin 3 mg qhs  -minimize overnight interruptions, group overnight vitals/labs/nursing checks as possible  -dim lights, close blinds and turn off tv to minimize stimulation and encourage sleep environment in evenings  -ensure that pain is well controlled  consider Tylenol 650 mg 3 times a day  -monitor for fecal and urinary retention which may precipitate delirium  -encourage early mobilization and ambulation  -provide frequent reorientation and redirection  -encourage family and friends at the bedside to help calm patient if anxious  -avoid medications which may precipitate or worsen delirium such as tramadol, benzodiazepine, anticholinergics, and antihistaminics  -encourage hydration and nutrition , assist with feeding   -redirect unwanted behaviors as first line, avoid physical restraints.   -PT OT  -Continue Seroquel 12.5 mg nightly-monitor Qtc  -observe without physical restraints  -continue gabapentin 100 mg p.o. QHS  -For severe agitation may use Zyprexa 2.5 mg every 8 hours intramuscular

## 2024-01-29 NOTE — ASSESSMENT & PLAN NOTE
At home No O2 at baseline, confirmed with NH.   2/2 PNA. Rx with iv Cefepime for now. MRSA screen negative.  Discontinue vancomycin.  May consider trending procalcitonin.  resolved

## 2024-01-29 NOTE — ASSESSMENT & PLAN NOTE
Add melatonin 3 mg nightly  Avoid nighttime interruptions and caffeine use in the afternoon  Avoid sedative hypnotics

## 2024-01-29 NOTE — ASSESSMENT & PLAN NOTE
Currently on cefepime monitor renal function and adjust dose as needed  Out of bed as tolerated and consult PT OT  Encourage incentive spirometry  Pulmonology follows appreciate input  CT chest on admission showed no right upper lobe mass. The opacity over the right upper lung on the chest radiograph is due to the nebulizer overlying the patient. Moderate bilateral lower lobe pneumonia. Interstitial edema with trace effusions. Pulmonary artery enlargement which can be seen with pulmonary hypertension.

## 2024-01-29 NOTE — PROGRESS NOTES
[facility]  Progress Note  Name: Satish Novak I  MRN: [id number]  Unit/Bed#: S MS Kari-01 I Date of Admission: 1/25/2024   Date of Service: 1/29/2024 I Hospital Day: 4    Assessment/Plan   * Multifocal pneumonia  Assessment & Plan  CT scan with moderate BL lower lobe pneumonia  IV cefepime day 4/7  See assessment and plan under sepsis    Sepsis (HCC)  Assessment & Plan  POA, A/E/B tachycardia, leukocytosis  Secondary to multifocal pneumonia.  Continue IV Cefepime, day 4/7  Blood culture negative at 72 hours  Legionella dn strep pneumo antigens negative    Atrial fibrillation (HCC)  Assessment & Plan  A-fib with RVR on admission due to sepsis from multifocal pneumonia  Currently on metoprolol 25 mg twice daily, continue Eliquis 2.5 mg twice daily.  On IV metoprolol as needed for heart rate greater than 140.  Continue the same.  RVR has since resolved    Metabolic encephalopathy  Assessment & Plan  At baseline patient wanders around in the nursing home but is not restless.  Acute metabolic encephalopathy secondary to multifocal pneumonia.  Continue patient on IV cefepime, day 4/7  Blood cultures negative at 72 hours  WBC trending down.    Appreciate pulmonary input.    Dementia (HCC)  Assessment & Plan  With behavioral disturbance secondary to pneumonia, hypoxic respiratory failure.  And hospital-acquired delirium.  Currently on Zyprexa 2.5 every 6 hours as needed agitation.  Geriatrics consultation, appreciate recomendations    Acute hypoxic respiratory failure (HCC)-resolved as of 1/29/2024  Assessment & Plan  At home No O2 at baseline, confirmed with NH.   2/2 PNA. Rx with iv Cefepime for now. MRSA screen negative.  Discontinue vancomycin.  May consider trending procalcitonin.  resolved            VTE Pharmacologic Prophylaxis:   Moderate Risk (Score 3-4) - Pharmacological DVT Prophylaxis Ordered: apixaban (Eliquis).    Mobility:   Basic Mobility Inpatient Raw Score: 12  [facility] Goal: 4:  "Move to chair/commode  [facility]-HLM Achieved: 2: Bed activities/Dependent transfer  HLM Goal NOT achieved. Continue with multidisciplinary rounding and encourage appropriate mobility to improve upon HLM goals.    Patient Centered Rounds: I performed bedside rounds with nursing staff today.   Discussions with Specialists or Other Care Team Provider: DEEPTI    Education and Discussions with Family / Patient: Updated  (guardian) via phone.    Total Time Spent on Date of Encounter in care of patient: 25 mins. This time was spent on one or more of the following: performing physical exam; counseling and coordination of care; obtaining or reviewing history; documenting in the medical record; reviewing/ordering tests, medications or procedures; communicating with other healthcare professionals and discussing with patient's family/caregivers.    Current Length of Stay: 4 day(s)  Current Patient Status: Inpatient   Certification Statement: The patient will continue to require additional inpatient hospital stay due to geriatrics eval  Discharge Plan: Anticipate discharge tomorrow to prior assisted or independent living facility.    Code Status: Level 3 - DNAR and DNI    Subjective:   The patient is seen resting in bed. He repeats \"be nice to me\" continuously. Appears comfortable.    Objective:     Vitals:   Temp (24hrs), Av.1 °F (36.7 °C), Min:97.2 °F (36.2 °C), Max:99.6 °F (37.6 °C)    Temp:  [97.2 °F (36.2 °C)-99.6 °F (37.6 °C)] 97.5 °F (36.4 °C)  HR:  [] 93  Resp:  [16] 16  BP: (114-171)/(61-93) 122/63  SpO2:  [91 %-98 %] 93 %  Body mass index is 23.69 kg/m².     Input and Output Summary (last 24 hours):     Intake/Output Summary (Last 24 hours) at 2024 1130  Last data filed at 2024 0503  Gross per 24 hour   Intake --   Output 496 ml   Net -496 ml       Physical Exam:   Physical Exam  Vitals and nursing note reviewed.   Constitutional:       General: He is not in acute distress.     Appearance: He is " ill-appearing. He is not toxic-appearing or diaphoretic.      Comments: Chronically ill appearing, dementia   HENT:      Head: Normocephalic and atraumatic.   Cardiovascular:      Rate and Rhythm: Normal rate. Rhythm irregular.      Heart sounds: No murmur heard.     No friction rub. No gallop.   Pulmonary:      Effort: Pulmonary effort is normal. No respiratory distress.      Breath sounds: No wheezing, rhonchi or rales.      Comments: Decreased breath sounds at bases    Abdominal:      General: Abdomen is flat. Bowel sounds are normal. There is no distension.      Palpations: Abdomen is soft.      Tenderness: There is no abdominal tenderness.   Musculoskeletal:      Right lower leg: No edema.      Left lower leg: No edema.   Skin:     General: Skin is warm and dry.      Coloration: Skin is not jaundiced or pale.   Neurological:      General: No focal deficit present.      Mental Status: He is alert and oriented to person, place, and time. Mental status is at baseline.        Additional Data:     Labs:  Results from last 7 days   Lab Units 01/29/24  0448   WBC Thousand/uL 8.51   HEMOGLOBIN g/dL 15.9   HEMATOCRIT % 48.7   PLATELETS Thousands/uL 118*   NEUTROS PCT % 64   LYMPHS PCT % 20   MONOS PCT % 12   EOS PCT % 2     Results from last 7 days   Lab Units 01/29/24  0717 01/27/24  0633   SODIUM mmol/L 139 140   POTASSIUM mmol/L 4.7 4.2   CHLORIDE mmol/L 107 109*   CO2 mmol/L 26 26   BUN mg/dL 29* 28*   CREATININE mg/dL 1.29 1.13   ANION GAP mmol/L 6 5   CALCIUM mg/dL 9.3 8.9   ALBUMIN g/dL  --  3.4*   TOTAL BILIRUBIN mg/dL  --  1.38*   ALK PHOS U/L  --  53   ALT U/L  --  9   AST U/L  --  18   GLUCOSE RANDOM mg/dL 96 81     Results from last 7 days   Lab Units 01/25/24  1844   INR  1.05             Results from last 7 days   Lab Units 01/29/24  0448 01/27/24  0633 01/26/24  0451 01/25/24  2340 01/25/24  2047 01/25/24  1844   LACTIC ACID mmol/L  --   --   --   --  1.3 3.5*   PROCALCITONIN ng/ml 4.93* 14.97* 19.90*  14.50*  --   --        Lines/Drains:  Invasive Devices       Peripheral Intravenous Line  Duration             Peripheral IV 01/29/24 Dorsal (posterior);Right Wrist <1 day              Drain  Duration             External Urinary Catheter 3 days                      Telemetry:  Telemetry Orders (From admission, onward)               24 Hour Telemetry Monitoring  Continuous x 24 Hours (Telem)        Question:  Reason for 24 Hour Telemetry  Answer:  Arrhythmias requiring acute medical intervention / PPM or ICD malfunction                     Telemetry Reviewed: Atrial fibrillation. HR averaging 90  Indication for Continued Telemetry Use: Arrthymias requiring medical therapy             Imaging: Reviewed radiology reports from this admission including: chest CT scan    Recent Cultures (last 7 days):   Results from last 7 days   Lab Units 01/25/24 2047 01/25/24  1844 01/25/24  1842   BLOOD CULTURE   --  No Growth at 72 hrs. No Growth at 72 hrs.   LEGIONELLA URINARY ANTIGEN  Negative  --   --        Last 24 Hours Medication List:   Current Facility-Administered Medications   Medication Dose Route Frequency Provider Last Rate    apixaban  2.5 mg Oral BID BEBO Galdamez      cefepime  2,000 mg Intravenous Q12H PHOENIX GaldamezNP 2,000 mg (01/29/24 0758)    chlorhexidine  15 mL Mouth/Throat Q12H PROSPER BEBO Galdamez      metoprolol  2.5 mg Intravenous Q6H PRN Dari Vanessa, DO      metoprolol tartrate  25 mg Oral Q12H PROSPER BEBO Galdamez      OLANZapine  2.5 mg Oral Q6H PRN Dari Vanessa,       polyethylene glycol  17 g Oral Daily Stephen Peralta MD      QUEtiapine  12.5 mg Oral HS Stephen Peralta MD      senna  2 tablet Oral HS Stephen Peralta MD          Today, Patient Was Seen By: Axel Giron PA-C    **Please Note: This note may have been constructed using a voice recognition system.**

## 2024-01-29 NOTE — ASSESSMENT & PLAN NOTE
A-fib with RVR on admission due to sepsis from multifocal pneumonia  Currently on metoprolol 25 mg twice daily, continue Eliquis 2.5 mg twice daily.  On IV metoprolol as needed for heart rate greater than 140.  Continue the same.  RVR has since resolved

## 2024-01-29 NOTE — ASSESSMENT & PLAN NOTE
Patient has advanced dementia with behavioral disturbance at baseline  He lives in a dementia unit distance with all IADLs and some ADLs  Will continue to provide supportive care, reorient as needed.  Patient is at high risk for delirium, will monitor closely and place on delirium precautions.  Maintain sleep/wake cycle.  Optimize pain regimen.  Monitor for constipation and urinary retention and manage as needed.  Continue B12 supplements goal B12 would be >400  Encourage family to visit.  Encourage to wear glasses and hearing aids while awake.  Encourage po intake, assist with feeding if needed.

## 2024-01-30 LAB
ANION GAP SERPL CALCULATED.3IONS-SCNC: 8 MMOL/L
BACTERIA UR QL AUTO: NORMAL /HPF
BILIRUB UR QL STRIP: NEGATIVE
BUN SERPL-MCNC: 33 MG/DL (ref 5–25)
CALCIUM SERPL-MCNC: 9.4 MG/DL (ref 8.4–10.2)
CHLORIDE SERPL-SCNC: 105 MMOL/L (ref 96–108)
CLARITY UR: CLEAR
CO2 SERPL-SCNC: 25 MMOL/L (ref 21–32)
COLOR UR: ABNORMAL
CREAT SERPL-MCNC: 1.38 MG/DL (ref 0.6–1.3)
ERYTHROCYTE [DISTWIDTH] IN BLOOD BY AUTOMATED COUNT: 13.2 % (ref 11.6–15.1)
GFR SERPL CREATININE-BSD FRML MDRD: 44 ML/MIN/1.73SQ M
GLUCOSE SERPL-MCNC: 100 MG/DL (ref 65–140)
GLUCOSE UR STRIP-MCNC: NEGATIVE MG/DL
HCT VFR BLD AUTO: 48.2 % (ref 36.5–49.3)
HGB BLD-MCNC: 15.8 G/DL (ref 12–17)
HGB UR QL STRIP.AUTO: NEGATIVE
KETONES UR STRIP-MCNC: ABNORMAL MG/DL
LEUKOCYTE ESTERASE UR QL STRIP: NEGATIVE
MCH RBC QN AUTO: 34.3 PG (ref 26.8–34.3)
MCHC RBC AUTO-ENTMCNC: 32.8 G/DL (ref 31.4–37.4)
MCV RBC AUTO: 105 FL (ref 82–98)
NITRITE UR QL STRIP: NEGATIVE
NON-SQ EPI CELLS URNS QL MICRO: NORMAL /HPF
PH UR STRIP.AUTO: 5 [PH]
PLATELET # BLD AUTO: 123 THOUSANDS/UL (ref 149–390)
PMV BLD AUTO: 10.8 FL (ref 8.9–12.7)
POTASSIUM SERPL-SCNC: 4.5 MMOL/L (ref 3.5–5.3)
PROT UR STRIP-MCNC: ABNORMAL MG/DL
RBC # BLD AUTO: 4.61 MILLION/UL (ref 3.88–5.62)
RBC #/AREA URNS AUTO: NORMAL /HPF
SODIUM SERPL-SCNC: 138 MMOL/L (ref 135–147)
SP GR UR STRIP.AUTO: 1.02 (ref 1–1.03)
UROBILINOGEN UR STRIP-ACNC: <2 MG/DL
VIT B12 SERPL-MCNC: 711 PG/ML (ref 180–914)
WBC # BLD AUTO: 8.76 THOUSAND/UL (ref 4.31–10.16)
WBC #/AREA URNS AUTO: NORMAL /HPF

## 2024-01-30 PROCEDURE — 99232 SBSQ HOSP IP/OBS MODERATE 35: CPT | Performed by: FAMILY MEDICINE

## 2024-01-30 PROCEDURE — 82607 VITAMIN B-12: CPT | Performed by: INTERNAL MEDICINE

## 2024-01-30 PROCEDURE — 85027 COMPLETE CBC AUTOMATED: CPT | Performed by: PHYSICIAN ASSISTANT

## 2024-01-30 PROCEDURE — 99232 SBSQ HOSP IP/OBS MODERATE 35: CPT | Performed by: INTERNAL MEDICINE

## 2024-01-30 PROCEDURE — 99223 1ST HOSP IP/OBS HIGH 75: CPT | Performed by: INTERNAL MEDICINE

## 2024-01-30 PROCEDURE — 81001 URINALYSIS AUTO W/SCOPE: CPT | Performed by: INTERNAL MEDICINE

## 2024-01-30 PROCEDURE — 80048 BASIC METABOLIC PNL TOTAL CA: CPT | Performed by: PHYSICIAN ASSISTANT

## 2024-01-30 PROCEDURE — 93005 ELECTROCARDIOGRAM TRACING: CPT

## 2024-01-30 RX ORDER — MELATONIN
2000 DAILY
Status: DISCONTINUED | OUTPATIENT
Start: 2024-01-30 | End: 2024-02-01 | Stop reason: HOSPADM

## 2024-01-30 RX ORDER — DILTIAZEM HYDROCHLORIDE 5 MG/ML
5 INJECTION INTRAVENOUS ONCE
Status: DISCONTINUED | OUTPATIENT
Start: 2024-01-30 | End: 2024-01-30

## 2024-01-30 RX ORDER — DILTIAZEM HYDROCHLORIDE 5 MG/ML
10 INJECTION INTRAVENOUS ONCE
Status: COMPLETED | OUTPATIENT
Start: 2024-01-30 | End: 2024-01-30

## 2024-01-30 RX ADMIN — APIXABAN 2.5 MG: 2.5 TABLET, FILM COATED ORAL at 09:06

## 2024-01-30 RX ADMIN — CYANOCOBALAMIN TAB 500 MCG 1000 MCG: 500 TAB at 12:03

## 2024-01-30 RX ADMIN — Medication 2000 UNITS: at 12:03

## 2024-01-30 RX ADMIN — SENNOSIDES 17.2 MG: 8.6 TABLET, FILM COATED ORAL at 21:22

## 2024-01-30 RX ADMIN — DILTIAZEM HYDROCHLORIDE 10 MG: 5 INJECTION INTRAVENOUS at 15:57

## 2024-01-30 RX ADMIN — CHLORHEXIDINE GLUCONATE 15 ML: 1.2 SOLUTION ORAL at 21:22

## 2024-01-30 RX ADMIN — CEFEPIME 2000 MG: 2 INJECTION, POWDER, FOR SOLUTION INTRAVENOUS at 09:18

## 2024-01-30 RX ADMIN — METOPROLOL TARTRATE 2.5 MG: 1 INJECTION, SOLUTION INTRAVENOUS at 21:16

## 2024-01-30 RX ADMIN — ACETAMINOPHEN 650 MG: 325 TABLET, FILM COATED ORAL at 09:06

## 2024-01-30 RX ADMIN — CEFEPIME 2000 MG: 2 INJECTION, POWDER, FOR SOLUTION INTRAVENOUS at 21:15

## 2024-01-30 RX ADMIN — ACETAMINOPHEN 650 MG: 325 TABLET, FILM COATED ORAL at 16:43

## 2024-01-30 RX ADMIN — METOPROLOL TARTRATE 25 MG: 25 TABLET, FILM COATED ORAL at 16:43

## 2024-01-30 RX ADMIN — METOPROLOL TARTRATE 2.5 MG: 1 INJECTION, SOLUTION INTRAVENOUS at 14:46

## 2024-01-30 RX ADMIN — OLANZAPINE 2.5 MG: 2.5 TABLET, FILM COATED ORAL at 23:36

## 2024-01-30 RX ADMIN — QUETIAPINE FUMARATE 12.5 MG: 25 TABLET ORAL at 21:22

## 2024-01-30 RX ADMIN — METOPROLOL TARTRATE 25 MG: 25 TABLET, FILM COATED ORAL at 09:06

## 2024-01-30 RX ADMIN — POLYETHYLENE GLYCOL 3350 17 G: 17 POWDER, FOR SOLUTION ORAL at 09:06

## 2024-01-30 RX ADMIN — APIXABAN 2.5 MG: 2.5 TABLET, FILM COATED ORAL at 17:24

## 2024-01-30 RX ADMIN — ACETAMINOPHEN 650 MG: 325 TABLET, FILM COATED ORAL at 21:22

## 2024-01-30 NOTE — QUICK NOTE
Discussed with nursing - as per nursing patient will be accepted to facility even if in restraints so restraints ordered at nursing request - soft limb.

## 2024-01-30 NOTE — PROGRESS NOTES
Progress Note - Geriatric Medicine   Satish Novak 89 y.o. male MRN: [id number]  Unit/Bed#: S -01 Encounter: [id number]      Assessment/Plan:  Hard of hearing  Assessment & Plan  Use hearing amplifier  Speak loud and clear    Insomnia  Assessment & Plan  Add melatonin 3 mg nightly  Avoid nighttime interruptions and caffeine use in the afternoon  Avoid sedative hypnotics    Metabolic encephalopathy  Assessment & Plan    -Patient is high risk of delirium due to dementia at baseline, pneumonia, change in environment, metabolic imbalance, cefepime  -Continue delirium precautions  -maintain normal sleep/wake cycle, add melatonin 3 mg qhs  -minimize overnight interruptions, group overnight vitals/labs/nursing checks as possible  -dim lights, close blinds and turn off tv to minimize stimulation and encourage sleep environment in evenings  -ensure that pain is well controlled  consider Tylenol 650 mg 3 times a day  -monitor for fecal and urinary retention which may precipitate delirium  -encourage early mobilization and ambulation  -provide frequent reorientation and redirection  -encourage family and friends at the bedside to help calm patient if anxious  -avoid medications which may precipitate or worsen delirium such as tramadol, benzodiazepine, anticholinergics, and antihistaminics  -encourage hydration and nutrition , assist with feeding if needed  -redirect unwanted behaviors as first line, avoid physical restraints.   -PT OT  -Continue Seroquel 12.5 mg nightly-monitor Qtc  -observe without physical restraints  -consider gabapentin 100 mg p.o. twice daily  -For severe agitation may use Zyprexa 2.5 mg every 8 hours intramuscular    Atrial fibrillation (HCC)  Assessment & Plan  Currently rate controlled.    yesterday afternoon  Continue metoprolol and anticoagulation with Eliquis  Monitor closely  Monitor electrolytes  Manage as per primary team    Dementia (HCC)  Assessment & Plan  Patient has advanced  "dementia with behavioral disturbance at baseline  He lives in a dementia unit distance with all IADLs and some ADLs  Will continue to provide supportive care, reorient as needed.  Patient is at high risk for delirium, will monitor closely and place on delirium precautions.  Maintain sleep/wake cycle.  Optimize pain regimen.  Monitor for constipation and urinary retention and manage as needed.  Start B12 supplements goal B12 would be >400  Encourage family to visit.  Encourage to wear glasses and hearing aids while awake.  Encourage po intake, assist with feeding if needed.     Fall  Assessment & Plan  Patient does not use any assistive device for ambulation at baseline  Per nursing staff at the facility he had a few recent falls with no major injury  Monitor orthostatic vital signs  Encourage p.o. hydration  Avoid hypotension and hypoglycemia   PT OT to follow    * Multifocal pneumonia  Assessment & Plan      Currently on cefepime monitor renal function and adjust dose as needed  Out of bed as tolerated and consult PT OT  Encourage incentive spirometry  Pulmonology follows appreciate input  CT chest on admission showed no right upper lobe mass. The opacity over the right upper lung on the chest radiograph is due to the nebulizer overlying the patient. Moderate bilateral lower lobe pneumonia. Interstitial edema with trace effusions. Pulmonary artery enlargement which can be seen with pulmonary hypertension.       Subjective:   Patient seen and examined at bedside for geriatric follow-up.  At the time of encounter patient is resting comfortably in bed.  Not able to provide any history.   No behaviors reported by nursing staff    Review of Systems   Unable to perform ROS: Dementia         Objective:     Vitals: Blood pressure 140/78, pulse 78, temperature 98.3 °F (36.8 °C), resp. rate 16, height 5' 10\" (1.778 m), weight 75.9 kg (167 lb 5.3 oz), SpO2 97%.,Body mass index is 24.01 kg/m².      Intake/Output Summary (Last " 24 hours) at 1/30/2024 1116  Last data filed at 1/29/2024 1201  Gross per 24 hour   Intake 221 ml   Output --   Net 221 ml       Current Medications: Reviewed    Physical Exam:   Physical Exam  Vitals and nursing note reviewed.   Constitutional:       General: He is not in acute distress.     Appearance: He is well-developed.      Comments: Frail looking   HENT:      Head: Normocephalic and atraumatic.      Ears:      Comments: [place]     Mouth/Throat:      Mouth: Mucous membranes are moist.   Eyes:      Conjunctiva/sclera: Conjunctivae normal.   Cardiovascular:      Rate and Rhythm: Normal rate and regular rhythm.      Heart sounds: No murmur heard.  Pulmonary:      Effort: Pulmonary effort is normal. No respiratory distress.      Breath sounds: Normal breath sounds.   Abdominal:      Palpations: Abdomen is soft.      Tenderness: There is no abdominal tenderness.   Musculoskeletal:         General: No swelling.      Cervical back: Neck supple.      Right lower leg: No edema.      Left lower leg: No edema.   Skin:     General: Skin is warm and dry.      Capillary Refill: Capillary refill takes less than 2 seconds.   Neurological:      Mental Status: He is alert.      Comments: Responds to name   Psychiatric:      Comments: Agitated at times  Wrist and waist restraints          Invasive Devices       Peripheral Intravenous Line  Duration             Peripheral IV 01/29/24 Dorsal (posterior);Right Wrist 1 day              Drain  Duration             External Urinary Catheter 4 days                    Lab, Imaging and other studies: I have personally reviewed pertinent reports.

## 2024-01-30 NOTE — ASSESSMENT & PLAN NOTE
At baseline patient wanders around in the nursing home but is not restless.  Acute metabolic encephalopathy secondary to multifocal pneumonia.  Continue patient on IV cefepime, day 4/7  Blood cultures negative at 72 hours  WBC trending down.    Appreciate pulmonary input.- they are recommending X ray given temp

## 2024-01-30 NOTE — ASSESSMENT & PLAN NOTE
CT scan with moderate BL lower lobe pneumonia  IV cefepime day 5/7  See assessment and plan under sepsis

## 2024-01-30 NOTE — PROGRESS NOTES
[facility]  Progress Note  Name: Satish Novak I  MRN: [id number]  Unit/Bed#: S MS Kari-01 I Date of Admission: 1/25/2024   Date of Service: 1/30/2024 I Hospital Day: 5    Assessment/Plan   Metabolic encephalopathy  Assessment & Plan  At baseline patient wanders around in the nursing home but is not restless.  Acute metabolic encephalopathy secondary to multifocal pneumonia.  Continue patient on IV cefepime, day 4/7  Blood cultures negative at 72 hours  WBC trending down.    Appreciate pulmonary input.- they are recommending X ray given temp    Atrial fibrillation (HCC)  Assessment & Plan  A-fib with RVR on admission due to sepsis from multifocal pneumonia  Currently on metoprolol 25 mg twice daily, continue Eliquis 2.5 mg twice daily.  On IV metoprolol as needed for heart rate greater than 140.  Continue the same.  RVR again last night- given cardizem one time   Will continue on telemetry       Sepsis ([facility])  Assessment & Plan  POA, A/E/B tachycardia, leukocytosis  Secondary to multifocal pneumonia.  Continue IV Cefepime, day 5/7  Blood culture negative at 72 hours  Legionella dn strep pneumo antigens negative    Dementia ([facility])  Assessment & Plan  With behavioral disturbance secondary to pneumonia, hypoxic respiratory failure.  And hospital-acquired delirium.  Currently on Zyprexa 2.5 every 6 hours as needed agitation.  Geriatrics consultation, appreciate recomendations    * Multifocal pneumonia  Assessment & Plan  CT scan with moderate BL lower lobe pneumonia  IV cefepime day 5/7  See assessment and plan under sepsis               VTE Pharmacologic Prophylaxis:   Moderate Risk (Score 3-4) - Pharmacological DVT Prophylaxis Ordered: heparin.    Mobility:   Basic Mobility Inpatient Raw Score: 11  -[facility] Goal: 4: Move to chair/commode  -HL Achieved: 2: Bed activities/Dependent transfer  Going to rehab.     Patient Centered Rounds: I performed bedside rounds with nursing staff today.    Discussions with Specialists or Other Care Team Provider: Discussed with pulmonology who are recommending X ray .    Education and Discussions with Family / Patient:  called guardian. . Left voicemail.     Total Time Spent on Date of Encounter in care of patient: 30 mins. This time was spent on one or more of the following: performing physical exam; counseling and coordination of care; obtaining or reviewing history; documenting in the medical record; reviewing/ordering tests, medications or procedures; communicating with other healthcare professionals and discussing with patient's family/caregivers.    Current Length of Stay: 5 day(s)  Current Patient Status: Inpatient   Certification Statement: The patient will continue to require additional inpatient hospital stay due to temp last night, tachycardic, getting X ray. Also still on IVABX.   Discharge Plan: Anticipate discharge in 24-48 hrs to to be determined.     Code Status: Level 3 - DNAR and DNI    Subjective:   Patient seen and examined   Confused no complaints.     Objective:     Vitals:   Temp (24hrs), Av.1 °F (37.3 °C), Min:98.3 °F (36.8 °C), Max:100.1 °F (37.8 °C)    Temp:  [98.3 °F (36.8 °C)-100.1 °F (37.8 °C)] 98.3 °F (36.8 °C)  HR:  [78-89] 78  BP: (117-140)/(61-78) 140/78  SpO2:  [97 %-98 %] 97 %  Body mass index is 24.01 kg/m².     Input and Output Summary (last 24 hours):   No intake or output data in the 24 hours ending 24 1405    Physical Exam:   Physical Exam  Constitutional:       General: He is not in acute distress.     Appearance: He is not ill-appearing, toxic-appearing or diaphoretic.   HENT:      Head: Normocephalic.      Mouth/Throat:      Mouth: Mucous membranes are moist.   Eyes:      General: No scleral icterus.        Right eye: No discharge.         Left eye: No discharge.      Pupils: Pupils are equal, round, and reactive to light.   Cardiovascular:      Rate and Rhythm: Normal rate. Rhythm irregular.      Heart sounds: No  murmur heard.     No friction rub. No gallop.   Pulmonary:      Effort: Pulmonary effort is normal. No respiratory distress.      Breath sounds: No stridor. No wheezing, rhonchi or rales.      Comments: Crackles both bases.   Chest:      Chest wall: No tenderness.   Abdominal:      General: There is no distension.      Palpations: There is no mass.      Tenderness: There is no abdominal tenderness. There is no right CVA tenderness, left CVA tenderness, guarding or rebound.      Hernia: No hernia is present.   Musculoskeletal:      Right lower leg: No edema.      Left lower leg: No edema.   Skin:     Coloration: Skin is pale. Skin is not jaundiced.      Findings: No bruising, erythema, lesion or rash.   Neurological:      Mental Status: He is alert.      Cranial Nerves: No cranial nerve deficit.      Sensory: No sensory deficit.      Gait: Gait normal.      Deep Tendon Reflexes: Reflexes normal.   Psychiatric:      Comments: Confused          Additional Data:     Labs:  Results from last 7 days   Lab Units 01/30/24  0559 01/29/24  0448   WBC Thousand/uL 8.76 8.51   HEMOGLOBIN g/dL 15.8 15.9   HEMATOCRIT % 48.2 48.7   PLATELETS Thousands/uL 123* 118*   NEUTROS PCT %  --  64   LYMPHS PCT %  --  20   MONOS PCT %  --  12   EOS PCT %  --  2     Results from last 7 days   Lab Units 01/30/24  0559 01/29/24  0717 01/27/24  0633   SODIUM mmol/L 138   < > 140   POTASSIUM mmol/L 4.5   < > 4.2   CHLORIDE mmol/L 105   < > 109*   CO2 mmol/L 25   < > 26   BUN mg/dL 33*   < > 28*   CREATININE mg/dL 1.38*   < > 1.13   ANION GAP mmol/L 8   < > 5   CALCIUM mg/dL 9.4   < > 8.9   ALBUMIN g/dL  --   --  3.4*   TOTAL BILIRUBIN mg/dL  --   --  1.38*   ALK PHOS U/L  --   --  53   ALT U/L  --   --  9   AST U/L  --   --  18   GLUCOSE RANDOM mg/dL 100   < > 81    < > = values in this interval not displayed.     Results from last 7 days   Lab Units 01/25/24  1844   INR  1.05             Results from last 7 days   Lab Units 01/29/24  0441  24  0633 24  0451 24  2340 24  2047 24  1844   LACTIC ACID mmol/L  --   --   --   --  1.3 3.5*   PROCALCITONIN ng/ml 4.93* 14.97* 19.90* 14.50*  --   --        Lines/Drains:  Invasive Devices       Peripheral Intravenous Line  Duration             Peripheral IV 24 Dorsal (posterior);Right Wrist 1 day              Drain  Duration             External Urinary Catheter 4 days                      Telemetry:  Telemetry Orders (From admission, onward)               24 Hour Telemetry Monitoring  Continuous x 24 Hours (Telem)           Question:  Reason for 24 Hour Telemetry  Answer:  Arrhythmias requiring acute medical intervention / PPM or ICD malfunction                     Telemetry Reviewed: Atrial fibrillation. HR averaging 110  Indication for Continued Telemetry Use: Arrthymias requiring medical therapy             Imaging: No pertinent imaging reviewed.    Recent Cultures (last 7 days):   Results from last 7 days   Lab Units 24  1844 24  184   BLOOD CULTURE   --  No Growth After 4 Days. No Growth After 4 Days.   LEGIONELLA URINARY ANTIGEN  Negative  --   --        Last 24 Hours Medication List:   Current Facility-Administered Medications   Medication Dose Route Frequency Provider Last Rate    acetaminophen  650 mg Oral TID Alissa Hicks MD      apixaban  2.5 mg Oral BID BEBO Galdamez      cefepime  2,000 mg Intravenous Q12H BEBO Galdamez 2,000 mg (24 0918)    chlorhexidine  15 mL Mouth/Throat Q12H BEBO Colmenares      cholecalciferol  2,000 Units Oral Daily Alissa Hicks MD      cyanocobalamin  1,000 mcg Oral Daily Alissa Hicks MD      metoprolol  2.5 mg Intravenous Q6H PRN Dari Vanessa DO      metoprolol tartrate  25 mg Oral Q12H BEBO Colmenares      OLANZapine  2.5 mg Oral Q6H PRN Dari Vanessa DO      polyethylene glycol  17 g Oral Daily Stephen Peralta MD      QUEtiapine  12.5 mg Oral HS  Stephen Peralta MD      senna  2 tablet Oral HS Stephen Peralta MD          Today, Patient Was Seen By: Grecia Desai MD    **Please Note: This note may have been constructed using a voice recognition system.**

## 2024-01-30 NOTE — CONSULTS
Consultation - Cardiology Team 1  Satish Novak 89 y.o. male MRN: [id number]  Unit/Bed#: S -01 Encounter: [id number]    Assessment/Plan     Principal Problem:    Multifocal pneumonia  Active Problems:    Fall    Dementia (HCC)    Sepsis (HCC)    Atrial fibrillation (HCC)    Metabolic encephalopathy    Insomnia    Hard of hearing      Assessment:  Atrial fibrillation with RVR  Atrial fibrillation is chronic  Reportedly worsens with agitation  It appears his outpatient regimen is Lopressor 25 mg twice a day (he did not receive his p.m. dose last night-documented blood pressures at that time 110-120s.  He responded well last night to 10mg IV cardizem  He is on Eliquis 2.5 mg every 12 hours  Normal TSH  Normal LV function with no significant valvular heart disease  Multifocal pneumonia-currently on cefepime.  Dementia  Sepsis-secondary to #2  Procalcitonin 19 peak, trending down  24-hour max heart rate 100.1 °F  Metabolic encephalopathy-gabriela the setting of sepsis    Plan:  Will increase lopressor to 25mg every 8 hrs with hold parameters for SBP under 110  Can use PRN lopressor. If not improvement he has responded to IV cardizem so reasonable. For now avoiding adding oral cardizem long term d/t prior hypotension while on  Anticipates HR's to improve as PNA clears and agitation improves.     History of Present Illness   Physician Requesting Consult: Grecia Desai MD  Reason for Consult / Principal Problem: Atrial fibrillation with RVR    HPI: Satish Novak is a 89 y.o. year old male with  chronic atrial fibrillation, dementia , HTN who presents with worsening hypoxia in the setting of multifocal pneumonia 5 days ago.  Patient resides at [facility]   He was placed on BiPAP for increased work of breathing..  In addition to oral beta-blocker patient has been given multiple doses over the past few days of IV Cardizem for tachycardia.  Cardiology consulted for atrial fibrillation with RVR.  She was seen in his room.   He is restrained.  He is confused.  He reportedly has periods of agitation.    Current echocardiogram normal LV function.  Mild left and right atrial dilatation.  Mild AI.  Mild MR.    Is followed by Dr. Kuo.  Patient's has had  variable heart rates requiring up and down titration of AV malia blockers in the past.  Last office visit 8/2023 his Lopressor was increased to 25 mg twice a day for heart rate  in the 120s.  At one point he was on metoprolol tartrate 12-1/2 mg every 12 hours and Cardizem 120 mg daily.  With this regimen he was hypotensive and bradycardic in the office.    Inpatient consult to Cardiology  Consult performed by: BEBO Strickland  Consult ordered by: Grecia Desai MD          Review of Systems   Reason unable to perform ROS: altered mental status.       Historical Information   Past Medical History:   Diagnosis Date    Anxiety     Atrial fibrillation (HCC)     Dementia (HCC)     Disorder of kidney and ureter     Dupuytren's disease     Fibromatosis     Hyperlipidemia     Hypertension     PAF (paroxysmal atrial fibrillation) (HCC)     Thrombocytopenia (HCC)     Tinea unguium     Vascular disease      History reviewed. No pertinent surgical history.  Social History     Substance and Sexual Activity   Alcohol Use Not Currently     Social History     Substance and Sexual Activity   Drug Use Never     Social History     Tobacco Use   Smoking Status Never    Passive exposure: Never   Smokeless Tobacco Never     Family History:   Family History   Problem Relation Age of Onset    No Known Problems Mother     No Known Problems Father        Meds/Allergies   current meds:   Current Facility-Administered Medications   Medication Dose Route Frequency    acetaminophen (TYLENOL) tablet 650 mg  650 mg Oral TID    apixaban (ELIQUIS) tablet 2.5 mg  2.5 mg Oral BID    ceFEPime (MAXIPIME) 2,000 mg in dextrose 5 % 50 mL IVPB  2,000 mg Intravenous Q12H    chlorhexidine (PERIDEX) 0.12 % oral rinse  "15 mL  15 mL Mouth/Throat Q12H PROSPER    cholecalciferol (VITAMIN D3) tablet 2,000 Units  2,000 Units Oral Daily    cyanocobalamin (VITAMIN B-12) tablet 1,000 mcg  1,000 mcg Oral Daily    diltiazem (CARDIZEM) injection 10 mg  10 mg Intravenous Once    metoprolol (LOPRESSOR) injection 2.5 mg  2.5 mg Intravenous Q6H PRN    metoprolol tartrate (LOPRESSOR) tablet 25 mg  25 mg Oral Q12H PROSPER    OLANZapine (ZyPREXA) tablet 2.5 mg  2.5 mg Oral Q6H PRN    polyethylene glycol (MIRALAX) packet 17 g  17 g Oral Daily    QUEtiapine (SEROquel) tablet 12.5 mg  12.5 mg Oral HS    senna (SENOKOT) tablet 17.2 mg  2 tablet Oral HS    and PTA meds:    Medications Prior to Admission   Medication    apixaban (ELIQUIS) 5 mg    cholecalciferol (VITAMIN D3) 1,000 units tablet    metoprolol tartrate (LOPRESSOR) 25 mg tablet    acetaminophen (TYLENOL) 325 mg tablet    albuterol (2.5 mg/3 mL) 0.083 % nebulizer solution    aluminum-magnesium hydroxide-simethicone (MAALOX) 200-200-20 MG/5ML SUSP    colchicine (COLCRYS) 0.6 mg tablet    cyanocobalamin (VITAMIN B-12) 2000 MCG tablet    ipratropium-albuterol (DUO-NEB) 0.5-2.5 mg/3 mL nebulizer solution    loperamide (Imodium A-D) 2 MG tablet    QUEtiapine (SEROquel) 25 mg tablet     Allergies   Allergen Reactions    Levaquin [Levofloxacin] Other (See Comments)     unknown       Objective   Vitals: Blood pressure 140/78, pulse 78, temperature 98.3 °F (36.8 °C), resp. rate 16, height 5' 10\" (1.778 m), weight 75.9 kg (167 lb 5.3 oz), SpO2 97%.  Orthostatic Blood Pressures      Flowsheet Row Most Recent Value   Blood Pressure 140/78 filed at 01/30/2024 0787   Patient Position - Orthostatic VS Lying filed at 01/28/2024 2200            No intake or output data in the 24 hours ending 01/30/24 1503    Invasive Devices       Peripheral Intravenous Line  Duration             Peripheral IV 01/29/24 Dorsal (posterior);Right Wrist 1 day              Drain  Duration             External Urinary Catheter 4 days      " "              Physical Exam: /78   Pulse 78   Temp 98.3 °F (36.8 °C)   Resp 16   Ht 5' 10\" (1.778 m)   Wt 75.9 kg (167 lb 5.3 oz)   SpO2 97%   BMI 24.01 kg/m²   General Appearance:    Alert, unooperative, no distress, appears stated age   Head:    Normocephalic, no scleral icterus   Eyes:    PERRL   Nose:   Nares normal, septum midline, mucosa normal, no drainage    Throat:   Lips, mucosa, and tongue normal   Neck:   Supple, symmetrical, trachea midline     no carotid bruit or JVD       Lungs:     Clear to auscultation bilaterally, respirations unlabored   Chest Wall:    No tenderness or deformity    Heart:    irregular rate and rhythm, S1 and S2 normal, no murmur, rub   or gallop   Abdomen:     Soft, non-tender   Extremities:   Extremities normal, atraumatic, no cyanosis or edema   Pulses:   2+ and symmetric all extremities   Skin:   Skin color, texture, turgor normal, no rashes or lesions   Neurologic:   Alert and disoriented.        Lab Results:   Recent Results (from the past 72 hour(s))   CBC and differential    Collection Time: 01/29/24  4:48 AM   Result Value Ref Range    WBC 8.51 4.31 - 10.16 Thousand/uL    RBC 4.74 3.88 - 5.62 Million/uL    Hemoglobin 15.9 12.0 - 17.0 g/dL    Hematocrit 48.7 36.5 - 49.3 %     (H) 82 - 98 fL    MCH 33.5 26.8 - 34.3 pg    MCHC 32.6 31.4 - 37.4 g/dL    RDW 13.3 11.6 - 15.1 %    MPV 10.6 8.9 - 12.7 fL    Platelets 118 (L) 149 - 390 Thousands/uL    nRBC 0 /100 WBCs    Neutrophils Relative 64 43 - 75 %    Immat GRANS % 1 0 - 2 %    Lymphocytes Relative 20 14 - 44 %    Monocytes Relative 12 4 - 12 %    Eosinophils Relative 2 0 - 6 %    Basophils Relative 1 0 - 1 %    Neutrophils Absolute 5.56 1.85 - 7.62 Thousands/µL    Immature Grans Absolute 0.04 0.00 - 0.20 Thousand/uL    Lymphocytes Absolute 1.70 0.60 - 4.47 Thousands/µL    Monocytes Absolute 1.01 0.17 - 1.22 Thousand/µL    Eosinophils Absolute 0.15 0.00 - 0.61 Thousand/µL    Basophils Absolute 0.05 0.00 - " 0.10 Thousands/µL   Procalcitonin    Collection Time: 01/29/24  4:48 AM   Result Value Ref Range    Procalcitonin 4.93 (H) <=0.25 ng/ml   Basic metabolic panel    Collection Time: 01/29/24  7:17 AM   Result Value Ref Range    Sodium 139 135 - 147 mmol/L    Potassium 4.7 3.5 - 5.3 mmol/L    Chloride 107 96 - 108 mmol/L    CO2 26 21 - 32 mmol/L    ANION GAP 6 mmol/L    BUN 29 (H) 5 - 25 mg/dL    Creatinine 1.29 0.60 - 1.30 mg/dL    Glucose 96 65 - 140 mg/dL    Calcium 9.3 8.4 - 10.2 mg/dL    eGFR 47 ml/min/1.73sq m   TSH, 3rd generation with Free T4 reflex    Collection Time: 01/29/24  7:17 AM   Result Value Ref Range    TSH 3RD GENERATON 1.564 0.450 - 4.500 uIU/mL   CBC    Collection Time: 01/30/24  5:59 AM   Result Value Ref Range    WBC 8.76 4.31 - 10.16 Thousand/uL    RBC 4.61 3.88 - 5.62 Million/uL    Hemoglobin 15.8 12.0 - 17.0 g/dL    Hematocrit 48.2 36.5 - 49.3 %     (H) 82 - 98 fL    MCH 34.3 26.8 - 34.3 pg    MCHC 32.8 31.4 - 37.4 g/dL    RDW 13.2 11.6 - 15.1 %    Platelets 123 (L) 149 - 390 Thousands/uL    MPV 10.8 8.9 - 12.7 fL   Basic metabolic panel    Collection Time: 01/30/24  5:59 AM   Result Value Ref Range    Sodium 138 135 - 147 mmol/L    Potassium 4.5 3.5 - 5.3 mmol/L    Chloride 105 96 - 108 mmol/L    CO2 25 21 - 32 mmol/L    ANION GAP 8 mmol/L    BUN 33 (H) 5 - 25 mg/dL    Creatinine 1.38 (H) 0.60 - 1.30 mg/dL    Glucose 100 65 - 140 mg/dL    Calcium 9.4 8.4 - 10.2 mg/dL    eGFR 44 ml/min/1.73sq m   Vitamin B12    Collection Time: 01/30/24  5:59 AM   Result Value Ref Range    Vitamin B-12 711 180 - 914 pg/mL     Imaging: I have personally reviewed pertinent reports.    EKG: Atrial fibrillation heart rate 169 bpm      Code Status: Level 3 - DNAR and DNI  Advance Directive and Living Will:      Power of :    POLST:      Counseling / Coordination of Care  Total floor / unit time spent today 35 minutes.  Greater than 50% of total time was spent with the patient and / or family  counseling and / or coordination of care.

## 2024-01-30 NOTE — ASSESSMENT & PLAN NOTE
A-fib with RVR on admission due to sepsis from multifocal pneumonia  Currently on metoprolol 25 mg twice daily, continue Eliquis 2.5 mg twice daily.  On IV metoprolol as needed for heart rate greater than 140.  Continue the same.  RVR again last night- given cardizem one time   Will continue on telemetry

## 2024-01-30 NOTE — ASSESSMENT & PLAN NOTE
POA, A/E/B tachycardia, leukocytosis  Secondary to multifocal pneumonia.  Continue IV Cefepime, day 5/7  Blood culture negative at 72 hours  Legionella dn strep pneumo antigens negative

## 2024-01-30 NOTE — QUICK NOTE
"Discussed with patient in person-   He is not confused or delirious, he states that he has been thinking about harming himself and when directly asked if he has thought about killing himself he states that he has. I asked him if he had a plan and he said no because \"it is kind of difficult to come up with one since I am blind\".   "

## 2024-01-31 LAB
ALBUMIN SERPL BCP-MCNC: 3.4 G/DL (ref 3.5–5)
ALP SERPL-CCNC: 53 U/L (ref 34–104)
ALT SERPL W P-5'-P-CCNC: 13 U/L (ref 7–52)
ANION GAP SERPL CALCULATED.3IONS-SCNC: 9 MMOL/L
AST SERPL W P-5'-P-CCNC: 21 U/L (ref 13–39)
ATRIAL RATE: 113 BPM
BACTERIA BLD CULT: NORMAL
BACTERIA BLD CULT: NORMAL
BASOPHILS # BLD AUTO: 0.04 THOUSANDS/ÂΜL (ref 0–0.1)
BASOPHILS NFR BLD AUTO: 1 % (ref 0–1)
BILIRUB SERPL-MCNC: 0.84 MG/DL (ref 0.2–1)
BUN SERPL-MCNC: 33 MG/DL (ref 5–25)
CALCIUM ALBUM COR SERPL-MCNC: 9.8 MG/DL (ref 8.3–10.1)
CALCIUM SERPL-MCNC: 9.3 MG/DL (ref 8.4–10.2)
CHLORIDE SERPL-SCNC: 109 MMOL/L (ref 96–108)
CO2 SERPL-SCNC: 20 MMOL/L (ref 21–32)
CREAT SERPL-MCNC: 1.15 MG/DL (ref 0.6–1.3)
EOSINOPHIL # BLD AUTO: 0.14 THOUSAND/ÂΜL (ref 0–0.61)
EOSINOPHIL NFR BLD AUTO: 2 % (ref 0–6)
ERYTHROCYTE [DISTWIDTH] IN BLOOD BY AUTOMATED COUNT: 13.1 % (ref 11.6–15.1)
GFR SERPL CREATININE-BSD FRML MDRD: 54 ML/MIN/1.73SQ M
GLUCOSE SERPL-MCNC: 98 MG/DL (ref 65–140)
HCT VFR BLD AUTO: 45.8 % (ref 36.5–49.3)
HGB BLD-MCNC: 15.2 G/DL (ref 12–17)
IMM GRANULOCYTES # BLD AUTO: 0.08 THOUSAND/UL (ref 0–0.2)
IMM GRANULOCYTES NFR BLD AUTO: 1 % (ref 0–2)
LYMPHOCYTES # BLD AUTO: 1.61 THOUSANDS/ÂΜL (ref 0.6–4.47)
LYMPHOCYTES NFR BLD AUTO: 19 % (ref 14–44)
MCH RBC QN AUTO: 33.4 PG (ref 26.8–34.3)
MCHC RBC AUTO-ENTMCNC: 33.2 G/DL (ref 31.4–37.4)
MCV RBC AUTO: 101 FL (ref 82–98)
MONOCYTES # BLD AUTO: 1.03 THOUSAND/ÂΜL (ref 0.17–1.22)
MONOCYTES NFR BLD AUTO: 12 % (ref 4–12)
NEUTROPHILS # BLD AUTO: 5.38 THOUSANDS/ÂΜL (ref 1.85–7.62)
NEUTS SEG NFR BLD AUTO: 65 % (ref 43–75)
NRBC BLD AUTO-RTO: 0 /100 WBCS
PLATELET # BLD AUTO: 137 THOUSANDS/UL (ref 149–390)
PMV BLD AUTO: 10.4 FL (ref 8.9–12.7)
POTASSIUM SERPL-SCNC: 4.2 MMOL/L (ref 3.5–5.3)
PROT SERPL-MCNC: 6.6 G/DL (ref 6.4–8.4)
QRS AXIS: 70 DEGREES
QRSD INTERVAL: 78 MS
QT INTERVAL: 292 MS
QTC INTERVAL: 440 MS
RBC # BLD AUTO: 4.55 MILLION/UL (ref 3.88–5.62)
SODIUM SERPL-SCNC: 138 MMOL/L (ref 135–147)
T WAVE AXIS: -52 DEGREES
VENTRICULAR RATE: 137 BPM
WBC # BLD AUTO: 8.28 THOUSAND/UL (ref 4.31–10.16)

## 2024-01-31 PROCEDURE — 99232 SBSQ HOSP IP/OBS MODERATE 35: CPT | Performed by: FAMILY MEDICINE

## 2024-01-31 PROCEDURE — 80053 COMPREHEN METABOLIC PANEL: CPT | Performed by: FAMILY MEDICINE

## 2024-01-31 PROCEDURE — 93010 ELECTROCARDIOGRAM REPORT: CPT | Performed by: INTERNAL MEDICINE

## 2024-01-31 PROCEDURE — 99232 SBSQ HOSP IP/OBS MODERATE 35: CPT | Performed by: INTERNAL MEDICINE

## 2024-01-31 PROCEDURE — 85025 COMPLETE CBC W/AUTO DIFF WBC: CPT | Performed by: FAMILY MEDICINE

## 2024-01-31 RX ORDER — GABAPENTIN 100 MG/1
100 CAPSULE ORAL
Status: DISCONTINUED | OUTPATIENT
Start: 2024-01-31 | End: 2024-02-01 | Stop reason: HOSPADM

## 2024-01-31 RX ORDER — LANOLIN ALCOHOL/MO/W.PET/CERES
3 CREAM (GRAM) TOPICAL
Status: DISCONTINUED | OUTPATIENT
Start: 2024-01-31 | End: 2024-02-01 | Stop reason: HOSPADM

## 2024-01-31 RX ADMIN — ACETAMINOPHEN 650 MG: 325 TABLET, FILM COATED ORAL at 15:15

## 2024-01-31 RX ADMIN — METOPROLOL TARTRATE 2.5 MG: 1 INJECTION, SOLUTION INTRAVENOUS at 15:15

## 2024-01-31 RX ADMIN — POLYETHYLENE GLYCOL 3350 17 G: 17 POWDER, FOR SOLUTION ORAL at 09:28

## 2024-01-31 RX ADMIN — CHLORHEXIDINE GLUCONATE 15 ML: 1.2 SOLUTION ORAL at 22:04

## 2024-01-31 RX ADMIN — Medication 3 MG: at 22:02

## 2024-01-31 RX ADMIN — CEFEPIME 2000 MG: 2 INJECTION, POWDER, FOR SOLUTION INTRAVENOUS at 08:01

## 2024-01-31 RX ADMIN — OLANZAPINE 2.5 MG: 2.5 TABLET, FILM COATED ORAL at 18:15

## 2024-01-31 RX ADMIN — METOPROLOL TARTRATE 25 MG: 25 TABLET, FILM COATED ORAL at 09:34

## 2024-01-31 RX ADMIN — GABAPENTIN 100 MG: 100 CAPSULE ORAL at 22:04

## 2024-01-31 RX ADMIN — Medication 2000 UNITS: at 09:29

## 2024-01-31 RX ADMIN — APIXABAN 2.5 MG: 2.5 TABLET, FILM COATED ORAL at 18:15

## 2024-01-31 RX ADMIN — ACETAMINOPHEN 650 MG: 325 TABLET, FILM COATED ORAL at 09:29

## 2024-01-31 RX ADMIN — CEFEPIME 2000 MG: 2 INJECTION, POWDER, FOR SOLUTION INTRAVENOUS at 20:42

## 2024-01-31 RX ADMIN — CYANOCOBALAMIN TAB 500 MCG 1000 MCG: 500 TAB at 09:29

## 2024-01-31 RX ADMIN — ACETAMINOPHEN 650 MG: 325 TABLET, FILM COATED ORAL at 22:01

## 2024-01-31 RX ADMIN — QUETIAPINE FUMARATE 12.5 MG: 25 TABLET ORAL at 22:01

## 2024-01-31 RX ADMIN — METOPROLOL TARTRATE 25 MG: 25 TABLET, FILM COATED ORAL at 17:22

## 2024-01-31 RX ADMIN — APIXABAN 2.5 MG: 2.5 TABLET, FILM COATED ORAL at 09:29

## 2024-01-31 NOTE — ASSESSMENT & PLAN NOTE
A-fib with RVR on admission due to sepsis from multifocal pneumonia  Currently on metoprolol 25 mg every 8 H continue Eliquis 2.5 mg twice daily.  Seen by cardiology yesterday  Given 2x cardizem IV between last night and night before.   Still with rates 120s to 130   Will continue on telemetry

## 2024-01-31 NOTE — ASSESSMENT & PLAN NOTE
CT scan with moderate BL lower lobe pneumonia  IV cefepime day 6/7  As above can transition to levaquin for 1 day if DC today if not continue with 7 days of cefepime through tomorrow.   See assessment and plan under sepsis

## 2024-01-31 NOTE — PROGRESS NOTES
Progress Note - Geriatric Medicine   Satish Novak 89 y.o. male MRN: [id number]  Unit/Bed#: S -01 Encounter: [id number]      Assessment/Plan:  Hard of hearing  Assessment & Plan  Use hearing amplifier  Speak loud and clear    Insomnia  Assessment & Plan  Add melatonin 3 mg nightly  Avoid nighttime interruptions and caffeine use in the afternoon  Avoid sedative hypnotics    Metabolic encephalopathy  Assessment & Plan    -Patient is high risk of delirium due to dementia at baseline, pneumonia, change in environment, metabolic imbalance, cefepime  -Continue delirium precautions  -maintain normal sleep/wake cycle, add melatonin 3 mg qhs  -minimize overnight interruptions, group overnight vitals/labs/nursing checks as possible  -dim lights, close blinds and turn off tv to minimize stimulation and encourage sleep environment in evenings  -ensure that pain is well controlled  consider Tylenol 650 mg 3 times a day  -monitor for fecal and urinary retention which may precipitate delirium  -encourage early mobilization and ambulation  -provide frequent reorientation and redirection  -encourage family and friends at the bedside to help calm patient if anxious  -avoid medications which may precipitate or worsen delirium such as tramadol, benzodiazepine, anticholinergics, and antihistaminics  -encourage hydration and nutrition , assist with feeding if needed  -redirect unwanted behaviors as first line, avoid physical restraints.   -PT OT  -Continue Seroquel 12.5 mg nightly-monitor Qtc  -observe without physical restraints  -consider gabapentin 100 mg p.o. twice daily  -For severe agitation may use Zyprexa 2.5 mg every 8 hours intramuscular    Atrial fibrillation (HCC)  Assessment & Plan  Currently rate controlled.    yesterday afternoon  Continue metoprolol and anticoagulation with Eliquis  Monitor closely  Monitor electrolytes  Manage as per primary team    Dementia (HCC)  Assessment & Plan  Patient has advanced  "dementia with behavioral disturbance at baseline  He lives in a dementia unit distance with all IADLs and some ADLs  Will continue to provide supportive care, reorient as needed.  Patient is at high risk for delirium, will monitor closely and place on delirium precautions.  Maintain sleep/wake cycle.  Optimize pain regimen.  Monitor for constipation and urinary retention and manage as needed.  Continue B12 supplements goal B12 would be >400  Encourage family to visit.  Encourage to wear glasses and hearing aids while awake.  Encourage po intake, assist with feeding if needed.     Fall  Assessment & Plan  Patient does not use any assistive device for ambulation at baseline  Per nursing staff at the facility he had a few recent falls with no major injury  Monitor orthostatic vital signs  Encourage p.o. hydration  Avoid hypotension and hypoglycemia   PT OT to follow    * Multifocal pneumonia  Assessment & Plan      Currently on cefepime monitor renal function and adjust dose as needed  Out of bed as tolerated and consult PT OT  Encourage incentive spirometry  Pulmonology follows appreciate input  CT chest on admission showed no right upper lobe mass. The opacity over the right upper lung on the chest radiograph is due to the nebulizer overlying the patient. Moderate bilateral lower lobe pneumonia. Interstitial edema with trace effusions. Pulmonary artery enlargement which can be seen with pulmonary hypertension.       Subjective:   Patient seen and examined at bedside for geriatric follow up. Not able to provide history. Per chart review he received zyprexa last evening.    Review of Systems   Unable to perform ROS: Dementia         Objective:     Vitals: Blood pressure 142/78, pulse 100, temperature 97.7 °F (36.5 °C), resp. rate 16, height 5' 10\" (1.778 m), weight 75.9 kg (167 lb 5.3 oz), SpO2 98%.,Body mass index is 24.01 kg/m².      Intake/Output Summary (Last 24 hours) at 1/31/2024 0232  Last data filed at " 1/30/2024 2115  Gross per 24 hour   Intake 480 ml   Output 525 ml   Net -45 ml       Current Medications: Reviewed    Physical Exam:   Physical Exam  Vitals and nursing note reviewed.   Constitutional:       General: He is not in acute distress.     Appearance: He is well-developed.   HENT:      Head: Normocephalic and atraumatic.      Ears:      Comments: [place]     Mouth/Throat:      Mouth: Mucous membranes are dry.   Eyes:      Conjunctiva/sclera: Conjunctivae normal.   Cardiovascular:      Rate and Rhythm: Normal rate and regular rhythm.      Heart sounds: No murmur heard.  Pulmonary:      Effort: Pulmonary effort is normal. No respiratory distress.      Breath sounds: Normal breath sounds.   Abdominal:      Palpations: Abdomen is soft.      Tenderness: There is no abdominal tenderness.   Musculoskeletal:         General: No swelling.      Cervical back: Neck supple.      Right lower leg: No edema.      Left lower leg: No edema.   Skin:     General: Skin is warm and dry.      Capillary Refill: Capillary refill takes less than 2 seconds.   Neurological:      Mental Status: He is alert. He is disoriented.      Comments: Responds to name   Psychiatric:      Comments: Agitated at times          Invasive Devices       Peripheral Intravenous Line  Duration             Peripheral IV 01/29/24 Dorsal (posterior);Right Wrist 2 days              Drain  Duration             External Urinary Catheter 5 days                    Lab, Imaging and other studies: I have personally reviewed pertinent reports.

## 2024-01-31 NOTE — ASSESSMENT & PLAN NOTE
At baseline patient wanders around in the nursing home but is not restless.  Acute metabolic encephalopathy secondary to multifocal pneumonia.  Discussed with Pulmonology -Okay to transition to oral levaquin if cleared for DC by cards if not will complete 7 days tomorrow.  Blood cultures negative at 72 hours  WBC trending down.

## 2024-01-31 NOTE — ASSESSMENT & PLAN NOTE
POA, A/E/B tachycardia, leukocytosis  Secondary to multifocal pneumonia.  Continue IV Cefepime, day 6/7  Blood culture negative at 72 hours  Legionella dn strep pneumo antigens negative

## 2024-01-31 NOTE — PLAN OF CARE
Problem: SAFETY,RESTRAINT: NV/NON-SELF DESTRUCTIVE BEHAVIOR  Goal: Remains free of harm/injury (restraint for non violent/non self-detsructive behavior)  Description: INTERVENTIONS:  - Instruct patient/family regarding restraint use   - Assess and monitor physiologic and psychological status   - Provide interventions and comfort measures to meet assessed patient needs   - Identify and implement measures to help patient regain control  - Assess readiness for release of restraint   Outcome: Progressing  Goal: Returns to optimal restraint-free functioning  Description: INTERVENTIONS:  - Assess the patient's behavior and symptoms that indicate continued need for restraint  - Identify and implement measures to help patient regain control  - Assess readiness for release of restraint   Outcome: Progressing     Problem: Prexisting or High Potential for Compromised Skin Integrity  Goal: Skin integrity is maintained or improved  Description: INTERVENTIONS:  - Identify patients at risk for skin breakdown  - Assess and monitor skin integrity  - Assess and monitor nutrition and hydration status  - Monitor labs   - Assess for incontinence   - Turn and reposition patient  - Assist with mobility/ambulation  - Relieve pressure over bony prominences  - Avoid friction and shearing  - Provide appropriate hygiene as needed including keeping skin clean and dry  - Evaluate need for skin moisturizer/barrier cream  - Collaborate with interdisciplinary team   - Patient/family teaching  - Consider wound care consult   Outcome: Progressing     Problem: INFECTION - ADULT  Goal: Absence or prevention of progression during hospitalization  Description: INTERVENTIONS:  - Assess and monitor for signs and symptoms of infection  - Monitor lab/diagnostic results  - Monitor all insertion sites, i.e. indwelling lines, tubes, and drains  - Monitor endotracheal if appropriate and nasal secretions for changes in amount and color  - [place] appropriate  cooling/warming therapies per order  - Administer medications as ordered  - Instruct and encourage patient and family to use good hand hygiene technique  - Identify and instruct in appropriate isolation precautions for identified infection/condition  Outcome: Progressing     Problem: NEUROSENSORY - ADULT  Goal: Achieves stable or improved neurological status  Description: INTERVENTIONS  - Monitor and report changes in neurological status  - Monitor vital signs such as temperature, blood pressure, glucose, and any other labs ordered   - Initiate measures to prevent increased intracranial pressure  - Monitor for seizure activity and implement precautions if appropriate      Outcome: Progressing  Goal: Achieves maximal functionality and self care  Description: INTERVENTIONS  - Monitor swallowing and airway patency with patient fatigue and changes in neurological status  - Encourage and assist patient to increase activity and self care.   - Encourage visually impaired, hearing impaired and aphasic patients to use assistive/communication devices  Outcome: Progressing     Problem: RESPIRATORY - ADULT  Goal: Achieves optimal ventilation and oxygenation  Description: INTERVENTIONS:  - Assess for changes in respiratory status  - Assess for changes in mentation and behavior  - Position to facilitate oxygenation and minimize respiratory effort  - Oxygen administered by appropriate delivery if ordered  - Initiate smoking cessation education as indicated  - Encourage broncho-pulmonary hygiene including cough, deep breathe, Incentive Spirometry  - Assess the need for suctioning and aspirate as needed  - Assess and instruct to report SOB or any respiratory difficulty  - Respiratory Therapy support as indicated  Outcome: Progressing

## 2024-01-31 NOTE — PROGRESS NOTES
[facility]  Progress Note  Name: Satish Novak I  MRN: [id number]  Unit/Bed#: S MS Nikole01 I Date of Admission: 1/25/2024   Date of Service: 1/31/2024 I Hospital Day: 6    Assessment/Plan   Metabolic encephalopathy  Assessment & Plan  At baseline patient wanders around in the nursing home but is not restless.  Acute metabolic encephalopathy secondary to multifocal pneumonia.  Discussed with Pulmonology -Okay to transition to oral levaquin if cleared for DC by cards if not will complete 7 days tomorrow.  Blood cultures negative at 72 hours  WBC trending down.        Atrial fibrillation (HCC)  Assessment & Plan  A-fib with RVR on admission due to sepsis from multifocal pneumonia  Currently on metoprolol 25 mg every 8 H continue Eliquis 2.5 mg twice daily.  Seen by cardiology yesterday  Given 2x cardizem IV between last night and night before.   Still with rates 120s to 130   Will continue on telemetry       Sepsis (HCC)  Assessment & Plan  POA, A/E/B tachycardia, leukocytosis  Secondary to multifocal pneumonia.  Continue IV Cefepime, day 6/7  Blood culture negative at 72 hours  Legionella dn strep pneumo antigens negative    Dementia ([facility])  Assessment & Plan  With behavioral disturbance secondary to pneumonia, hypoxic respiratory failure.  And hospital-acquired delirium.  Currently on Zyprexa 2.5 every 6 hours as needed agitation.  Geriatrics consultation, appreciate recomendations    * Multifocal pneumonia  Assessment & Plan  CT scan with moderate BL lower lobe pneumonia  IV cefepime day 6/7  As above can transition to levaquin for 1 day if DC today if not continue with 7 days of cefepime through tomorrow.   See assessment and plan under sepsis               VTE Pharmacologic Prophylaxis:   Moderate Risk (Score 3-4) - Pharmacological DVT Prophylaxis Ordered: heparin.        Patient Centered Rounds: I performed bedside rounds with nursing staff today.   Discussions with Specialists or Other  "Care Team Provider: Discussed with Pulmonlogy regarding abx. Discussed with cardiology they will see patient today.     Education and Discussions with Family / Patient:  see quick note. .     Total Time Spent on Date of Encounter in care of patient: 30 mins. This time was spent on one or more of the following: performing physical exam; counseling and coordination of care; obtaining or reviewing history; documenting in the medical record; reviewing/ordering tests, medications or procedures; communicating with other healthcare professionals and discussing with patient's family/caregivers.    Current Length of Stay: 6 day(s)  Current Patient Status: Inpatient   Certification Statement: The patient will continue to require additional inpatient hospital stay due to afib with RVR.   Discharge Plan:  today or tomorrow depending on cardiac clearance for afib with RVR.     Code Status: Level 3 - DNAR and DNI    Subjective:   Patient seen and examined   Feeling \"okay\"  Confused   No complaints.     Objective:     Vitals:   Temp (24hrs), Av.8 °F (36.6 °C), Min:97.5 °F (36.4 °C), Max:98.3 °F (36.8 °C)    Temp:  [97.5 °F (36.4 °C)-98.3 °F (36.8 °C)] 97.7 °F (36.5 °C)  HR:  [] 100  Resp:  [16-18] 16  BP: (105-154)/(56-99) 142/78  SpO2:  [95 %-98 %] 98 %  Body mass index is 24.01 kg/m².     Input and Output Summary (last 24 hours):     Intake/Output Summary (Last 24 hours) at 2024 1319  Last data filed at 2024 2115  Gross per 24 hour   Intake 480 ml   Output 525 ml   Net -45 ml       Physical Exam:   Physical Exam  Constitutional:       General: He is not in acute distress.     Appearance: He is ill-appearing (chronically). He is not toxic-appearing or diaphoretic.   HENT:      Head: Normocephalic.      Mouth/Throat:      Mouth: Mucous membranes are moist.   Eyes:      General: No scleral icterus.        Right eye: No discharge.         Left eye: No discharge.      Pupils: Pupils are equal, round, and reactive " to light.   Cardiovascular:      Rate and Rhythm: Tachycardia present. Rhythm irregular.      Heart sounds: Murmur heard.   Pulmonary:      Effort: No respiratory distress.      Breath sounds: No stridor. No wheezing, rhonchi or rales.   Chest:      Chest wall: No tenderness.   Abdominal:      General: Abdomen is flat. There is no distension.      Palpations: There is no mass.      Tenderness: There is no abdominal tenderness. There is no right CVA tenderness, left CVA tenderness, guarding or rebound.      Hernia: No hernia is present.   Musculoskeletal:      Right lower leg: No edema.      Left lower leg: No edema.   Skin:     Coloration: Skin is not jaundiced or pale.      Findings: No bruising, erythema, lesion or rash.   Neurological:      General: No focal deficit present.      Mental Status: He is alert.      Cranial Nerves: No cranial nerve deficit.      Sensory: No sensory deficit.      Motor: No weakness.      Coordination: Coordination normal.      Gait: Gait normal.      Deep Tendon Reflexes: Reflexes normal.   Psychiatric:         Mood and Affect: Mood normal.      Comments: Confused            Additional Data:     Labs:  Results from last 7 days   Lab Units 01/31/24  0416   WBC Thousand/uL 8.28   HEMOGLOBIN g/dL 15.2   HEMATOCRIT % 45.8   PLATELETS Thousands/uL 137*   NEUTROS PCT % 65   LYMPHS PCT % 19   MONOS PCT % 12   EOS PCT % 2     Results from last 7 days   Lab Units 01/31/24  0416   SODIUM mmol/L 138   POTASSIUM mmol/L 4.2   CHLORIDE mmol/L 109*   CO2 mmol/L 20*   BUN mg/dL 33*   CREATININE mg/dL 1.15   ANION GAP mmol/L 9   CALCIUM mg/dL 9.3   ALBUMIN g/dL 3.4*   TOTAL BILIRUBIN mg/dL 0.84   ALK PHOS U/L 53   ALT U/L 13   AST U/L 21   GLUCOSE RANDOM mg/dL 98     Results from last 7 days   Lab Units 01/25/24  1844   INR  1.05             Results from last 7 days   Lab Units 01/29/24  0448 01/27/24  0633 01/26/24  0451 01/25/24  2340 01/25/24  2047 01/25/24  1844   LACTIC ACID mmol/L  --   --   --    --  1.3 3.5*   PROCALCITONIN ng/ml 4.93* 14.97* 19.90* 14.50*  --   --        Lines/Drains:  Invasive Devices       Peripheral Intravenous Line  Duration             Peripheral IV 01/29/24 Dorsal (posterior);Right Wrist 2 days              Drain  Duration             External Urinary Catheter 5 days                      Telemetry:  Telemetry Orders (From admission, onward)               24 Hour Telemetry Monitoring  Continuous x 24 Hours (Telem)        Expiring   Question:  Reason for 24 Hour Telemetry  Answer:  Arrhythmias requiring acute medical intervention / PPM or ICD malfunction                     Telemetry Reviewed: Atrial fibrillation. HR averaging 130s  Indication for Continued Telemetry Use: Arrthymias requiring medical therapy             Imaging: No pertinent imaging reviewed.    Recent Cultures (last 7 days):   Results from last 7 days   Lab Units 01/25/24 2047 01/25/24 1844 01/25/24  1842   BLOOD CULTURE   --  No Growth After 5 Days. No Growth After 5 Days.   LEGIONELLA URINARY ANTIGEN  Negative  --   --        Last 24 Hours Medication List:   Current Facility-Administered Medications   Medication Dose Route Frequency Provider Last Rate    acetaminophen  650 mg Oral TID Alissa Hicks MD      apixaban  2.5 mg Oral BID BEBO Galdamez      cefepime  2,000 mg Intravenous Q12H BEBO Galdamez 2,000 mg (01/31/24 0801)    chlorhexidine  15 mL Mouth/Throat Q12H [facility] BEBO Galdamez      cholecalciferol  2,000 Units Oral Daily Alissa Hicks MD      cyanocobalamin  1,000 mcg Oral Daily Alissa Hicks MD      gabapentin  100 mg Oral HS Alissa Hicks MD      melatonin  3 mg Oral HS Alissa Hicks MD      metoprolol  2.5 mg Intravenous Q6H PRN Dari Vanessa DO      metoprolol tartrate  25 mg Oral Q8H BEBO Strickland      OLANZapine  2.5 mg Oral Q6H PRN Dari Vanessa DO      polyethylene glycol  17 g Oral Daily Stephen Peralta MD      QUEtiapine  12.5 mg Oral HS Stephen Phillips  MD Fabian      senna  2 tablet Oral HS Stephen Peralta MD          Today, Patient Was Seen By: Grecia Desai MD    **Please Note: This note may have been constructed using a voice recognition system.**

## 2024-02-01 VITALS
OXYGEN SATURATION: 99 % | HEART RATE: 81 BPM | HEIGHT: 70 IN | RESPIRATION RATE: 16 BRPM | DIASTOLIC BLOOD PRESSURE: 83 MMHG | WEIGHT: 164.24 LBS | SYSTOLIC BLOOD PRESSURE: 134 MMHG | TEMPERATURE: 99.6 F | BODY MASS INDEX: 23.51 KG/M2

## 2024-02-01 LAB
ANION GAP SERPL CALCULATED.3IONS-SCNC: 7 MMOL/L
BASOPHILS # BLD AUTO: 0.04 THOUSANDS/ÂΜL (ref 0–0.1)
BASOPHILS NFR BLD AUTO: 1 % (ref 0–1)
BUN SERPL-MCNC: 33 MG/DL (ref 5–25)
CALCIUM SERPL-MCNC: 9.2 MG/DL (ref 8.4–10.2)
CHLORIDE SERPL-SCNC: 111 MMOL/L (ref 96–108)
CO2 SERPL-SCNC: 22 MMOL/L (ref 21–32)
CREAT SERPL-MCNC: 1.21 MG/DL (ref 0.6–1.3)
EOSINOPHIL # BLD AUTO: 0.19 THOUSAND/ÂΜL (ref 0–0.61)
EOSINOPHIL NFR BLD AUTO: 3 % (ref 0–6)
ERYTHROCYTE [DISTWIDTH] IN BLOOD BY AUTOMATED COUNT: 13.3 % (ref 11.6–15.1)
GFR SERPL CREATININE-BSD FRML MDRD: 51 ML/MIN/1.73SQ M
GLUCOSE SERPL-MCNC: 94 MG/DL (ref 65–140)
HCT VFR BLD AUTO: 45.8 % (ref 36.5–49.3)
HGB BLD-MCNC: 15.5 G/DL (ref 12–17)
IMM GRANULOCYTES # BLD AUTO: 0.13 THOUSAND/UL (ref 0–0.2)
IMM GRANULOCYTES NFR BLD AUTO: 2 % (ref 0–2)
LYMPHOCYTES # BLD AUTO: 1.31 THOUSANDS/ÂΜL (ref 0.6–4.47)
LYMPHOCYTES NFR BLD AUTO: 22 % (ref 14–44)
MAGNESIUM SERPL-MCNC: 2.1 MG/DL (ref 1.9–2.7)
MCH RBC QN AUTO: 34.5 PG (ref 26.8–34.3)
MCHC RBC AUTO-ENTMCNC: 33.8 G/DL (ref 31.4–37.4)
MCV RBC AUTO: 102 FL (ref 82–98)
MONOCYTES # BLD AUTO: 0.82 THOUSAND/ÂΜL (ref 0.17–1.22)
MONOCYTES NFR BLD AUTO: 14 % (ref 4–12)
NEUTROPHILS # BLD AUTO: 3.41 THOUSANDS/ÂΜL (ref 1.85–7.62)
NEUTS SEG NFR BLD AUTO: 58 % (ref 43–75)
NRBC BLD AUTO-RTO: 0 /100 WBCS
PLATELET # BLD AUTO: 145 THOUSANDS/UL (ref 149–390)
PMV BLD AUTO: 10.5 FL (ref 8.9–12.7)
POTASSIUM SERPL-SCNC: 4.5 MMOL/L (ref 3.5–5.3)
RBC # BLD AUTO: 4.49 MILLION/UL (ref 3.88–5.62)
SODIUM SERPL-SCNC: 140 MMOL/L (ref 135–147)
WBC # BLD AUTO: 5.9 THOUSAND/UL (ref 4.31–10.16)

## 2024-02-01 PROCEDURE — 99232 SBSQ HOSP IP/OBS MODERATE 35: CPT | Performed by: FAMILY MEDICINE

## 2024-02-01 PROCEDURE — 99232 SBSQ HOSP IP/OBS MODERATE 35: CPT | Performed by: INTERNAL MEDICINE

## 2024-02-01 PROCEDURE — 83735 ASSAY OF MAGNESIUM: CPT | Performed by: INTERNAL MEDICINE

## 2024-02-01 PROCEDURE — 99239 HOSP IP/OBS DSCHRG MGMT >30: CPT | Performed by: INTERNAL MEDICINE

## 2024-02-01 PROCEDURE — 80048 BASIC METABOLIC PNL TOTAL CA: CPT | Performed by: INTERNAL MEDICINE

## 2024-02-01 PROCEDURE — 85025 COMPLETE CBC W/AUTO DIFF WBC: CPT | Performed by: INTERNAL MEDICINE

## 2024-02-01 RX ORDER — GABAPENTIN 100 MG/1
100 CAPSULE ORAL
Start: 2024-02-01

## 2024-02-01 RX ORDER — CHLORHEXIDINE GLUCONATE ORAL RINSE 1.2 MG/ML
15 SOLUTION DENTAL EVERY 12 HOURS SCHEDULED
Start: 2024-02-01

## 2024-02-01 RX ORDER — QUETIAPINE FUMARATE 25 MG/1
12.5 TABLET, FILM COATED ORAL
Start: 2024-02-01

## 2024-02-01 RX ORDER — SENNOSIDES 8.6 MG
17.2 TABLET ORAL
Start: 2024-02-01

## 2024-02-01 RX ORDER — LANOLIN ALCOHOL/MO/W.PET/CERES
3 CREAM (GRAM) TOPICAL
Start: 2024-02-01

## 2024-02-01 RX ORDER — METOPROLOL TARTRATE 37.5 MG/1
37.5 TABLET, FILM COATED ORAL EVERY 12 HOURS SCHEDULED
Start: 2024-02-01

## 2024-02-01 RX ADMIN — CHLORHEXIDINE GLUCONATE 15 ML: 1.2 SOLUTION ORAL at 09:43

## 2024-02-01 RX ADMIN — APIXABAN 2.5 MG: 2.5 TABLET, FILM COATED ORAL at 09:43

## 2024-02-01 RX ADMIN — ACETAMINOPHEN 650 MG: 325 TABLET, FILM COATED ORAL at 09:43

## 2024-02-01 RX ADMIN — CEFEPIME 2000 MG: 2 INJECTION, POWDER, FOR SOLUTION INTRAVENOUS at 10:24

## 2024-02-01 RX ADMIN — CYANOCOBALAMIN TAB 500 MCG 1000 MCG: 500 TAB at 09:43

## 2024-02-01 RX ADMIN — Medication 2000 UNITS: at 09:43

## 2024-02-01 RX ADMIN — METOPROLOL TARTRATE 25 MG: 25 TABLET, FILM COATED ORAL at 04:05

## 2024-02-01 RX ADMIN — METOPROLOL TARTRATE 2.5 MG: 1 INJECTION, SOLUTION INTRAVENOUS at 13:11

## 2024-02-01 NOTE — CASE MANAGEMENT
Case Management Progress Note    Patient name Satish Novak  Location S /S -01 MRN [id number]  : 1931 Date 2024       LOS (days): 7  Geometric Mean LOS (GMLOS) (days): 5.1  Days to GMLOS:-1.4        OBJECTIVE:        Current admission status: Inpatient  Preferred Pharmacy:   UNKNOWN - FOLLOW UP PRIOR TO DISCHARGE TO E-PRESCRIBE  No address on file      Primary Care Provider: Cecelia Jarrell MD    Primary Insurance: [employer]  Secondary Insurance: [facility]    PROGRESS NOTE:    Weekly Care Management Length of Stay Review     Current LOS: 7 Days    Most Recent Labs:     Lab Results   Component Value Date/Time    WBC 5.90 2024 04:19 AM    HGB 15.5 2024 04:19 AM    HCT 45.8 2024 04:19 AM     (L) 2024 04:19 AM    SODIUM 140 2024 04:19 AM    K 4.5 2024 04:19 AM     (H) 2024 04:19 AM    CO2 22 2024 04:19 AM    BUN 33 (H) 2024 04:19 AM    CREATININE 1.21 2024 04:19 AM    GLUC 94 2024 04:19 AM    ALKPHOS 53 2024 04:16 AM    ALT 13 2024 04:16 AM    AST 21 2024 04:16 AM    ALB 3.4 (L) 2024 04:16 AM    TBILI 0.84 2024 04:16 AM       Most Recent Vitals:   Vitals:    24 0404   BP: 131/85   Pulse: 81   Resp:    Temp:    SpO2: 99%        Identified Barriers to Discharge/Discharge Goals/Care Management Interventions: PNA, dementia w/agitation    Intended Discharge Disposition: LTC at [facility]    Expected Discharge Date: today at 3pm

## 2024-02-01 NOTE — DISCHARGE SUMMARY
Discharge Summary - [facility]    Patient Information: Satish Novak 89 y.o. male MRN: [id number]  Unit/Bed#: S -01 Encounter: [id number]    Discharging Physician / Practitioner: Roxy Ramos MD  PCP: Cecelia Jarrell MD  Admission Date: 1/25/2024  Discharge Date: 02/01/24    Reason for Admission: Multifocal pneumonia    Discharge Diagnoses:     Principal Problem:    Multifocal pneumonia  Active Problems:    Fall    Dementia (HCC)    Sepsis (HCC)    Atrial fibrillation (HCC)    Metabolic encephalopathy    Insomnia    Hard of hearing      Consultations During Hospital Stay:  Geriatric medicine  Cardiology    Procedures Performed:   None    Significant findings:  Chest x-ray -multifocal airspace opacities, suspicious for pneumonia.  CT chest - No right upper lobe mass. The opacity over the right upper lung on the chest radiograph is due to the nebulizer overlying the patient. Moderate bilateral lower lobe pneumonia. Interstitial edema with trace effusions. Pulmonary artery enlargement which can be seen with pulmonary hypertension.    Hospital Course:   Satish Novak is a 89 y.o. male patient who originally presented to the hospital on 1/25/2024 due to hypoxia.  The patient came in from his nursing facility after he was found to be hypoxic with increased work of breathing.  He required BiPAP and was also noted with A-fib with RVR.  He was admitted with a diagnosis of acute hypoxic respiratory failure, rapid A-fib and sepsis.  He was treated with IV antibiotics and IV Cardizem.  He was subsequently transitioned to oral metoprolol 25 mg every 8 hours with improved rate control.  He was seen by cardiology during his hospital course to continue to adjust medication.  The patient does have a history of dementia and required restraints during his hospital course secondary to behavioral disturbance.  He completed 7 days IV antibiotic course with cefepime on 2/1/2024 and was cleared for  "discharge back to his facility.    Condition at Discharge: stable     Discharge Day Visit / Exam:     Subjective: No acute overnight events.  The patient was pleasantly confused on my exam, no distress noted.  Tolerating p.o., no vomiting.    Vitals: Blood Pressure: 131/85 (02/01/24 0404)  Pulse: 81 (02/01/24 0404)  Temperature: 99.6 °F (37.6 °C) (01/31/24 2157)  Temp Source: Axillary (01/28/24 2202)  Respirations: 16 (01/31/24 0804)  Height: 5' 10\" (177.8 cm) (01/26/24 1400)  Weight - Scale: 74.5 kg (164 lb 3.9 oz) (02/01/24 0404)  SpO2: 99 % (02/01/24 0404)    General Appearance:    Alert, pleasantly confused, no distress noted   Head:    Normocephalic, mucous membranes moist   Eyes:    Conjunctiva/corneas clear   Neck:   Supple   Lungs:   Rations nonlabored, lungs are clear bilaterally    Heart:  Irregularly irregular, rate controlled   Abdomen:     Soft, non-tender, nondistended   Extremities: No lower extremity edema   Neurologic: Confused and unable to fully follow commands.  Moving all extremities, no facial droop or asymmetry noted      Discharge instructions/Information to patient and family:   See after visit summary for information provided to patient and family.      Provisions for Follow-Up Care:  See after visit summary for information related to follow-up care and any pertinent home health orders.      Disposition: Skilled nursing facility at [facility]    Unable to reach guardian on the phone.  Left voicemail.  Updated the patient's niece Amy.  All questions answered.    Discharge Statement:  I spent >30 minutes discharging the patient. This time was spent on the day of discharge. I had direct contact with the patient on the day of discharge. Greater than 50% of the total time was spent examining patient, answering all patient questions, arranging and discussing plan of care with patient as well as directly providing post-discharge instructions.  Additional time then spent on discharge " activities.    Discharge Medications:  See after visit summary for reconciled discharge medications provided to patient and family.      ** Please Note: Dragon 360 Dictation voice to text software may have been used in the creation of this document. **

## 2024-02-01 NOTE — PLAN OF CARE
Problem: SAFETY,RESTRAINT: NV/NON-SELF DESTRUCTIVE BEHAVIOR  Goal: Remains free of harm/injury (restraint for non violent/non self-detsructive behavior)  Description: INTERVENTIONS:  - Instruct patient/family regarding restraint use   - Assess and monitor physiologic and psychological status   - Provide interventions and comfort measures to meet assessed patient needs   - Identify and implement measures to help patient regain control  - Assess readiness for release of restraint   Outcome: Progressing     Problem: Prexisting or High Potential for Compromised Skin Integrity  Goal: Skin integrity is maintained or improved  Description: INTERVENTIONS:  - Identify patients at risk for skin breakdown  - Assess and monitor skin integrity  - Assess and monitor nutrition and hydration status  - Monitor labs   - Assess for incontinence   - Turn and reposition patient  - Assist with mobility/ambulation  - Relieve pressure over bony prominences  - Avoid friction and shearing  - Provide appropriate hygiene as needed including keeping skin clean and dry  - Evaluate need for skin moisturizer/barrier cream  - Collaborate with interdisciplinary team   - Patient/family teaching  - Consider wound care consult   Outcome: Progressing     Problem: INFECTION - ADULT  Goal: Absence or prevention of progression during hospitalization  Description: INTERVENTIONS:  - Assess and monitor for signs and symptoms of infection  - Monitor lab/diagnostic results  - Monitor all insertion sites, i.e. indwelling lines, tubes, and drains  - Monitor endotracheal if appropriate and nasal secretions for changes in amount and color  - [place] appropriate cooling/warming therapies per order  - Administer medications as ordered  - Instruct and encourage patient and family to use good hand hygiene technique  - Identify and instruct in appropriate isolation precautions for identified infection/condition  Outcome: Progressing     Problem: RESPIRATORY -  ADULT  Goal: Achieves optimal ventilation and oxygenation  Description: INTERVENTIONS:  - Assess for changes in respiratory status  - Assess for changes in mentation and behavior  - Position to facilitate oxygenation and minimize respiratory effort  - Oxygen administered by appropriate delivery if ordered  - Initiate smoking cessation education as indicated  - Encourage broncho-pulmonary hygiene including cough, deep breathe, Incentive Spirometry  - Assess the need for suctioning and aspirate as needed  - Assess and instruct to report SOB or any respiratory difficulty  - Respiratory Therapy support as indicated  Outcome: Progressing

## 2024-02-01 NOTE — PROGRESS NOTES
Progress Note - Geriatric Medicine   Satish Novak 89 y.o. male MRN: [id number]  Unit/Bed#: S -01 Encounter: [id number]      Assessment/Plan:  Hard of hearing  Assessment & Plan  Use hearing amplifier  Speak loud and clear    Insomnia  Assessment & Plan  Add melatonin 3 mg nightly  Avoid nighttime interruptions and caffeine use in the afternoon  Avoid sedative hypnotics    Metabolic encephalopathy  Assessment & Plan    -Patient is high risk of delirium due to dementia at baseline, pneumonia, change in environment, metabolic imbalance, cefepime  -Continue delirium precautions  -maintain normal sleep/wake cycle, continue melatonin 3 mg qhs  -minimize overnight interruptions, group overnight vitals/labs/nursing checks as possible  -dim lights, close blinds and turn off tv to minimize stimulation and encourage sleep environment in evenings  -ensure that pain is well controlled  consider Tylenol 650 mg 3 times a day  -monitor for fecal and urinary retention which may precipitate delirium  -encourage early mobilization and ambulation  -provide frequent reorientation and redirection  -encourage family and friends at the bedside to help calm patient if anxious  -avoid medications which may precipitate or worsen delirium such as tramadol, benzodiazepine, anticholinergics, and antihistaminics  -encourage hydration and nutrition , assist with feeding   -redirect unwanted behaviors as first line, avoid physical restraints.   -PT OT  -Continue Seroquel 12.5 mg nightly-monitor Qtc  -observe without physical restraints  -continue gabapentin 100 mg p.o. QHS  -For severe agitation may use Zyprexa 2.5 mg every 8 hours intramuscular    Atrial fibrillation (HCC)  Assessment & Plan  Currently rate controlled.   Continue metoprolol and anticoagulation with Eliquis  Monitor closely  Monitor electrolytes  Manage as per primary team    Dementia (HCC)  Assessment & Plan  Patient has advanced dementia with behavioral disturbance at  "baseline  He lives in a dementia unit distance with all IADLs and some ADLs  Will continue to provide supportive care, reorient as needed.  Patient is at high risk for delirium, will monitor closely and place on delirium precautions.  Maintain sleep/wake cycle.  Optimize pain regimen.  Monitor for constipation and urinary retention and manage as needed.  Continue B12 supplements goal B12 would be >400  Encourage family to visit.  Encourage to wear glasses and hearing aids while awake.  Encourage po intake, assist with feeding if needed.     Fall  Assessment & Plan  Patient does not use any assistive device for ambulation at baseline  Per nursing staff at the facility he had a few recent falls with no major injury  Monitor orthostatic vital signs  Encourage p.o. hydration  Avoid hypotension and hypoglycemia   PT OT to follow    * Multifocal pneumonia  Assessment & Plan      Currently on cefepime monitor renal function and adjust dose as needed  Out of bed as tolerated and consult PT OT  Encourage incentive spirometry  Pulmonology follows appreciate input  CT chest on admission showed no right upper lobe mass. The opacity over the right upper lung on the chest radiograph is due to the nebulizer overlying the patient. Moderate bilateral lower lobe pneumonia. Interstitial edema with trace effusions. Pulmonary artery enlargement which can be seen with pulmonary hypertension.       Subjective:   Patient seen and examined at bedside for geriatric follow-up.  At the time of encounter patient was eating his breakfast.  Seems comfortable.  Last bowel movement yesterday.  Slept well overnight  Per nursing staff no acute events to report    Review of Systems   Unable to perform ROS: Dementia         Objective:     Vitals: Blood pressure 131/85, pulse 81, temperature 99.6 °F (37.6 °C), resp. rate 16, height 5' 10\" (1.778 m), weight 74.5 kg (164 lb 3.9 oz), SpO2 99%.,Body mass index is 23.57 kg/m².      Intake/Output Summary " (Last 24 hours) at 2/1/2024 1238  Last data filed at 2/1/2024 1000  Gross per 24 hour   Intake 900 ml   Output --   Net 900 ml       Current Medications: Reviewed    Physical Exam:   Physical Exam  Vitals and nursing note reviewed.   Constitutional:       General: He is not in acute distress.     Appearance: He is well-developed.   HENT:      Head: Normocephalic and atraumatic.      Ears:      Comments: [place]     Mouth/Throat:      Mouth: Mucous membranes are dry.   Eyes:      Conjunctiva/sclera: Conjunctivae normal.   Cardiovascular:      Rate and Rhythm: Normal rate and regular rhythm.      Heart sounds: Heart sounds are distant. No murmur heard.  Pulmonary:      Effort: Pulmonary effort is normal. No respiratory distress.      Breath sounds: Normal breath sounds.   Abdominal:      Palpations: Abdomen is soft.      Tenderness: There is no abdominal tenderness.   Musculoskeletal:         General: No swelling.      Cervical back: Neck supple.      Right lower leg: No edema.      Left lower leg: No edema.   Skin:     General: Skin is warm and dry.      Capillary Refill: Capillary refill takes less than 2 seconds.   Neurological:      Mental Status: He is alert. Mental status is at baseline. He is disoriented.      Comments: Responds to name   Psychiatric:         Mood and Affect: Mood normal.          Invasive Devices       Peripheral Intravenous Line  Duration             Peripheral IV 01/29/24 Dorsal (posterior);Right Wrist 3 days              Drain  Duration             External Urinary Catheter 6 days                    Lab, Imaging and other studies: I have personally reviewed pertinent reports.

## 2024-02-01 NOTE — DISCHARGE INSTR - AVS FIRST PAGE
Dear Satish Novak,     It was our pleasure to care for you here at [facility].  It is our hope that we were always able to exceed the expected standards for your care during your stay.  You were hospitalized due to multifocal pneumonia and rapid atrial fibrillation.  You were cared for on the South second floor under the service of Roxy Ramos MD with the [facility]ist Group who covers for your primary care physician (PCP), Cecelia Jarrell MD, while you were hospitalized.  If you have any questions or concerns related to this hospitalization, you may contact us at .  For follow up as well as medication refills, we recommend that you follow up with your primary care physician.  A registered nurse will reach out to you by phone within a few days after your discharge to answer any additional questions that you may have after going home.  However, at this time we provide for you here, the most important instructions / recommendations at discharge:       Notable Medication Adjustments -   Take metoprolol 37.5 mg every 12 hours    Testing Required after Discharge -   None    Important follow up information -   Follow-up with your primary care physician and cardiologist in 1 to 2 weeks    Other Instructions -   We hope you feel better soon. If your symptoms worsen, please call 911 immediately or go to the nearest Emergency Department.    Please review this entire after visit summary as additional general instructions including medication list, appointments, activity, diet, any pertinent wound care, and other additional recommendations from your care team that may be provided for you.      Sincerely,     Roxy Ramos MD

## 2024-02-01 NOTE — CASE MANAGEMENT
Case Management Discharge Planning Note    Patient name Satish Novak  Location S /S -01 MRN [id number]  : 1931 Date 2024       Current Admission Date: 2024  Current Admission Diagnosis:Multifocal pneumonia   Patient Active Problem List    Diagnosis Date Noted    Multifocal pneumonia 2024    Insomnia 2024    Hard of hearing 2024    Metabolic encephalopathy 2024    Atrial fibrillation (HCC) 2024    Hospital discharge follow-up 2023    Acute gout of left foot 2023    Sepsis (HCC) 2023    Chronic thrombocytopenia (HCC) 2023    Sensory neural hearing loss 2023    Hyperlipidemia 2023    Prediabetes 2023    Fall 2023    Paroxysmal atrial fibrillation (HCC) 2023    Dementia (HCC) 2023      LOS (days): 7  Geometric Mean LOS (GMLOS) (days): 5.1  Days to GMLOS:-1.6     OBJECTIVE:  Risk of Unplanned Readmission Score: 16.01         Current admission status: Inpatient   Preferred Pharmacy:   UNKNOWN - FOLLOW UP PRIOR TO DISCHARGE TO E-PRESCRIBE  No address on file      Primary Care Provider: Cecelia Jarrell MD    Primary Insurance: [employer]  Secondary Insurance: [facility]    DISCHARGE DETAILS:    Discharge planning discussed with:: Eduardo Valencia  Freedom of Choice: Yes  Comments - Freedom of Choice: Return to [facility] contacted family/caregiver?: Yes  Were Treatment Team discharge recommendations reviewed with patient/caregiver?: Yes  Did patient/caregiver verbalize understanding of patient care needs?: N/A- going to facility  Were patient/caregiver advised of the risks associated with not following Treatment Team discharge recommendations?: Yes    Contacts  Patient Contacts: Eduardo Dickinson  Relationship to Patient:: Other (Comment) (Guardian)  Contact Method: Phone  Phone Number: see facesheet - Voice message left  Reason/Outcome: Discharge Planning    Requested Home  Health Care         Is the patient interested in HHC at discharge?: No    DME Referral Provided  Referral made for DME?: No    Other Referral/Resources/Interventions Provided:  Interventions: SNF  Referral Comments: Doreen cassidy    Would you like to participate in our [facility] service program?  : No - Declined    Treatment Team Recommendation: SNF  Discharge Destination Plan:: SNF  Transport at Discharge : hospitals Ambulance  Dispatcher Contacted: Yes  Number/Name of Dispatcher: [employer]  Transported by (Company and Unit #): [employer]  ETA of Transport (Date): 02/01/24  ETA of Transport (Time): 1500     Transfer Mode: Stretcher  Accompanied by: EMS personnel     IMM Given (Date):: 02/01/24  IMM Given to:: Family   ..IMM reviewed with patient's caregiver, patient's caregiver agrees with discharge determination.

## 2024-02-01 NOTE — PROGRESS NOTES
"Cardiology Progress Note - Satish Novak 89 y.o. male MRN: [id number]    Unit/Bed#: S -01 Encounter: [id number]      Assessment/Recommendations:  1.  Atrial fibrillation with rapid ventricular response: Chronic A-fib noted.  Heart rates do worsen with agitation.  Currently on beta-blocker and has not required intermittent IV Cardizem dosing overnight.  Beta-blocker at 25 mg every 8 hours has improved heart rate control.  Will change to 37.5 mg twice daily for ease of administration at home.  Heart rate seems to be improving with improvement of multifocal pneumonia.  Patient had been on p.o. Cardizem in the past for rate control, however this was discontinued due to hypotension.  Continued on Eliquis for anticoagulation.  2.  Multifocal pneumonia: Continued on antibiotics as per primary team.  3.  Dementia  4.  Sepsis: Secondary to #2.  Continue management as per primary team.  5.  Metabolic encephalopathy: Likely in setting of sepsis and pneumonia.  6.  Stable from cardiac standpoint for discharge with outpatient follow-up.    Subjective:   Patient seen and examined.  No significant events overnight.  Review of systems unreliable secondary to dementia.    Objective:     Vitals: Blood pressure 131/85, pulse 81, temperature 99.6 °F (37.6 °C), resp. rate 16, height 5' 10\" (1.778 m), weight 74.5 kg (164 lb 3.9 oz), SpO2 99%., Body mass index is 23.57 kg/m².,   Orthostatic Blood Pressures      Flowsheet Row Most Recent Value   Blood Pressure 131/85 filed at 02/01/2024 0404   Patient Position - Orthostatic VS Lying filed at 01/28/2024 2202              Intake/Output Summary (Last 24 hours) at 2/1/2024 0901  Last data filed at 1/31/2024 2130  Gross per 24 hour   Intake 480 ml   Output --   Net 480 ml       TELE: Atrial fibrillation with well-controlled heart rates    Physical Exam:    GEN: Satish Novak appears well, alert  HEENT: pupils equal, round, and reactive to light; extraocular muscles intact  NECK: supple, " no carotid bruits   HEART: Irregular rhythm, normal S1 and S2, + systolic murmur, no clicks, gallops or rubs   LUNGS: clear to auscultation bilaterally; no wheezes, rales, or rhonchi   ABDOMEN: normal bowel sounds, soft, no tenderness, no distention  EXTREMITIES: peripheral pulses normal; no clubbing, cyanosis, or edema  NEURO: no focal findings   SKIN: normal without suspicious lesions on exposed skin    Medications:      Current Facility-Administered Medications:     acetaminophen (TYLENOL) tablet 650 mg, 650 mg, Oral, TID, Alissa Hicks MD, 650 mg at 01/31/24 2201    apixaban (ELIQUIS) tablet 2.5 mg, 2.5 mg, Oral, BID, BEBO Galdamez, 2.5 mg at 01/31/24 1815    ceFEPime (MAXIPIME) 2,000 mg in dextrose 5 % 50 mL IVPB, 2,000 mg, Intravenous, Q12H, BEBO Galdamez, Last Rate: 100 mL/hr at 01/31/24 2042, 2,000 mg at 01/31/24 2042    chlorhexidine (PERIDEX) 0.12 % oral rinse 15 mL, 15 mL, Mouth/Throat, Q12H PROSPER, BEBO Galdamez, 15 mL at 01/31/24 2204    cholecalciferol (VITAMIN D3) tablet 2,000 Units, 2,000 Units, Oral, Daily, Alissa Hicks MD, 2,000 Units at 01/31/24 0929    cyanocobalamin (VITAMIN B-12) tablet 1,000 mcg, 1,000 mcg, Oral, Daily, Alissa Hicks MD, 1,000 mcg at 01/31/24 0929    gabapentin (NEURONTIN) capsule 100 mg, 100 mg, Oral, HS, Alissa Hicks MD, 100 mg at 01/31/24 2204    melatonin tablet 3 mg, 3 mg, Oral, HS, Alissa Hicks MD, 3 mg at 01/31/24 2202    metoprolol (LOPRESSOR) injection 2.5 mg, 2.5 mg, Intravenous, Q6H PRN, Dari Vanessa DO, 2.5 mg at 01/31/24 1515    metoprolol tartrate (LOPRESSOR) tablet 25 mg, 25 mg, Oral, Q8H, BEBO Strickland, 25 mg at 02/01/24 0405    OLANZapine (ZyPREXA) tablet 2.5 mg, 2.5 mg, Oral, Q6H PRN, Dari Vanessa DO, 2.5 mg at 01/31/24 1815    QUEtiapine (SEROquel) tablet 12.5 mg, 12.5 mg, Oral, HS, Stephen Peralta MD, 12.5 mg at 01/31/24 2201    senna (SENOKOT) tablet 17.2 mg, 2 tablet, Oral, HS, Stephen Peralta MD, 17.2 mg  at 01/30/24 2122     Labs & Results:        Results from last 7 days   Lab Units 02/01/24 0419 01/31/24 0416 01/30/24  0559   WBC Thousand/uL 5.90 8.28 8.76   HEMOGLOBIN g/dL 15.5 15.2 15.8   HEMATOCRIT % 45.8 45.8 48.2   PLATELETS Thousands/uL 145* 137* 123*         Results from last 7 days   Lab Units 02/01/24 0419 01/31/24 0416 01/30/24  0559 01/29/24  0717 01/27/24  0633 01/26/24  0451   POTASSIUM mmol/L 4.5 4.2 4.5   < > 4.2 4.9   CHLORIDE mmol/L 111* 109* 105   < > 109* 108   CO2 mmol/L 22 20* 25   < > 26 22   BUN mg/dL 33* 33* 33*   < > 28* 37*   CREATININE mg/dL 1.21 1.15 1.38*   < > 1.13 1.41*   CALCIUM mg/dL 9.2 9.3 9.4   < > 8.9 8.5   ALK PHOS U/L  --  53  --   --  53 46   ALT U/L  --  13  --   --  9 9   AST U/L  --  21  --   --  18 15    < > = values in this interval not displayed.     Results from last 7 days   Lab Units 01/25/24  1844   INR  1.05   PTT seconds 28     Results from last 7 days   Lab Units 02/01/24 0419 01/27/24  0633 01/26/24  0451   MAGNESIUM mg/dL 2.1 2.4 3.2*       Echo: personally reviewed -normal LV size and function with mildly dilated RV and moderately reduced function.  Biatrial enlargement.  Mild regurgitation, aortic, mitral, and tricuspid valves.    EKG personally reviewed by Phani Ruvalcaba MD.

## 2024-02-02 NOTE — UTILIZATION REVIEW
NOTIFICATION OF ADMISSION DISCHARGE   This is a Notification of Discharge from [facility]. Please be advised that this patient has been discharge from our facility. Below you will find the admission and discharge date and time including the patient’s disposition.   UTILIZATION REVIEW CONTACT:  Karol Villagomez  Utilization   Network Utilization Review Department  Phone: [phone] x carefully listen to the prompts. All voicemails are confidential.  Email: [email or web address]     ADMISSION INFORMATION  PRESENTATION DATE: 1/25/2024  6:29 PM  OBERVATION ADMISSION DATE:   INPATIENT ADMISSION DATE: 1/25/24  9:15 PM   DISCHARGE DATE: 2/1/2024  3:15 PM   DISPOSITION:Discharged/Transferred to Long Term Care/Personal Care Home/Assisted Living    Network Utilization Review Department  ATTENTION: Please call with any questions or concerns to [phone] and carefully listen to the prompts so that you are directed to the right person. All voicemails are confidential.   For Discharge needs, contact Care Management DC Support Team at [phone] opt. 2  Send all requests for admission clinical reviews, approved or denied determinations and any other requests to dedicated fax number below belonging to the campus where the patient is receiving treatment. List of dedicated fax numbers for the Facilities:  FACILITY NAME UR FAX NUMBER   ADMISSION DENIALS (Administrative/Medical Necessity) [phone]   DISCHARGE SUPPORT TEAM ([facility]) [phone]   PARENT CHILD HEALTH (Maternity/NICU/Pediatrics) [phone]   [facility] [phone]   [facility] [phone]   [facility] [phone]   [facility] [phone]   [facility] [phone]   [facility] [phone]   [facility] [phone]   [facility]  Critical access hospital [phone]   [facility] [phone]   [facility] [phone]   [facility] [phone]   [facility] [phone]

## 2024-02-21 PROBLEM — A41.9 SEPSIS (HCC): Status: RESOLVED | Noted: 2023-04-06 | Resolved: 2024-02-21

## 2024-02-21 PROBLEM — J18.9 MULTIFOCAL PNEUMONIA: Status: RESOLVED | Noted: 2024-01-29 | Resolved: 2024-02-21

## 2025-01-01 ENCOUNTER — HOSPITAL ENCOUNTER (INPATIENT)
Facility: HOSPITAL | Age: OVER 89
LOS: 5 days | Discharge: NURSING, REHAB OR LONG-TERM CARE | DRG: 872 | End: 2025-01-06
Attending: EMERGENCY MEDICINE | Admitting: INTERNAL MEDICINE
Payer: COMMERCIAL

## 2025-01-01 ENCOUNTER — APPOINTMENT (INPATIENT)
Dept: RADIOLOGY | Facility: HOSPITAL | Age: OVER 89
DRG: 872 | End: 2025-01-01
Payer: COMMERCIAL

## 2025-01-01 ENCOUNTER — APPOINTMENT (EMERGENCY)
Dept: RADIOLOGY | Facility: HOSPITAL | Age: OVER 89
DRG: 872 | End: 2025-01-01
Payer: COMMERCIAL

## 2025-01-01 DIAGNOSIS — I48.91 ATRIAL FIBRILLATION WITH RVR (HCC): ICD-10-CM

## 2025-01-01 DIAGNOSIS — R06.03 RESPIRATORY DISTRESS: Primary | ICD-10-CM

## 2025-01-01 DIAGNOSIS — J18.9 PNA (PNEUMONIA): ICD-10-CM

## 2025-01-01 DIAGNOSIS — R06.02 SHORTNESS OF BREATH: ICD-10-CM

## 2025-01-01 DIAGNOSIS — F03.90 DEMENTIA (HCC): ICD-10-CM

## 2025-01-01 DIAGNOSIS — R06.2 WHEEZING: ICD-10-CM

## 2025-01-01 PROBLEM — R65.10 SIRS (SYSTEMIC INFLAMMATORY RESPONSE SYNDROME) (HCC): Status: ACTIVE | Noted: 2025-01-01

## 2025-01-01 PROBLEM — J96.01 ACUTE HYPOXIC RESPIRATORY FAILURE (HCC): Status: RESOLVED | Noted: 2024-01-25 | Resolved: 2025-01-01

## 2025-01-01 LAB
2HR DELTA HS TROPONIN: 0 NG/L
ALBUMIN SERPL BCG-MCNC: 3.8 G/DL (ref 3.5–5)
ALP SERPL-CCNC: 70 U/L (ref 34–104)
ALT SERPL W P-5'-P-CCNC: 17 U/L (ref 7–52)
ANION GAP SERPL CALCULATED.3IONS-SCNC: 6 MMOL/L (ref 4–13)
APTT PPP: 32 SECONDS (ref 23–34)
AST SERPL W P-5'-P-CCNC: 32 U/L (ref 13–39)
ATRIAL RATE: 129 BPM
BACTERIA UR QL AUTO: NORMAL /HPF
BASOPHILS # BLD AUTO: 0.02 THOUSANDS/ΜL (ref 0–0.1)
BASOPHILS NFR BLD AUTO: 0 % (ref 0–1)
BILIRUB SERPL-MCNC: 0.82 MG/DL (ref 0.2–1)
BILIRUB UR QL STRIP: NEGATIVE
BUN SERPL-MCNC: 29 MG/DL (ref 5–25)
CALCIUM SERPL-MCNC: 9.3 MG/DL (ref 8.4–10.2)
CARDIAC TROPONIN I PNL SERPL HS: 7 NG/L (ref ?–50)
CARDIAC TROPONIN I PNL SERPL HS: 7 NG/L (ref ?–50)
CHLORIDE SERPL-SCNC: 105 MMOL/L (ref 96–108)
CLARITY UR: CLEAR
CO2 SERPL-SCNC: 28 MMOL/L (ref 21–32)
COLOR UR: ABNORMAL
CREAT SERPL-MCNC: 1.36 MG/DL (ref 0.6–1.3)
EOSINOPHIL # BLD AUTO: 0.04 THOUSAND/ΜL (ref 0–0.61)
EOSINOPHIL NFR BLD AUTO: 1 % (ref 0–6)
ERYTHROCYTE [DISTWIDTH] IN BLOOD BY AUTOMATED COUNT: 14.4 % (ref 11.6–15.1)
FLUAV AG UPPER RESP QL IA.RAPID: NEGATIVE
FLUBV AG UPPER RESP QL IA.RAPID: NEGATIVE
GFR SERPL CREATININE-BSD FRML MDRD: 44 ML/MIN/1.73SQ M
GLUCOSE SERPL-MCNC: 104 MG/DL (ref 65–140)
GLUCOSE UR STRIP-MCNC: NEGATIVE MG/DL
HCT VFR BLD AUTO: 47.6 % (ref 36.5–49.3)
HGB BLD-MCNC: 15.4 G/DL (ref 12–17)
HGB UR QL STRIP.AUTO: NEGATIVE
IMM GRANULOCYTES # BLD AUTO: 0.07 THOUSAND/UL (ref 0–0.2)
IMM GRANULOCYTES NFR BLD AUTO: 1 % (ref 0–2)
INR PPP: 1.1 (ref 0.85–1.19)
KETONES UR STRIP-MCNC: NEGATIVE MG/DL
LACTATE SERPL-SCNC: 1.7 MMOL/L (ref 0.5–2)
LEUKOCYTE ESTERASE UR QL STRIP: NEGATIVE
LYMPHOCYTES # BLD AUTO: 2.27 THOUSANDS/ΜL (ref 0.6–4.47)
LYMPHOCYTES NFR BLD AUTO: 30 % (ref 14–44)
MCH RBC QN AUTO: 33 PG (ref 26.8–34.3)
MCHC RBC AUTO-ENTMCNC: 32.4 G/DL (ref 31.4–37.4)
MCV RBC AUTO: 102 FL (ref 82–98)
MONOCYTES # BLD AUTO: 0.74 THOUSAND/ΜL (ref 0.17–1.22)
MONOCYTES NFR BLD AUTO: 10 % (ref 4–12)
NEUTROPHILS # BLD AUTO: 4.46 THOUSANDS/ΜL (ref 1.85–7.62)
NEUTS SEG NFR BLD AUTO: 58 % (ref 43–75)
NITRITE UR QL STRIP: NEGATIVE
NON-SQ EPI CELLS URNS QL MICRO: NORMAL /HPF
NRBC BLD AUTO-RTO: 0 /100 WBCS
PH UR STRIP.AUTO: 5.5 [PH]
PLATELET # BLD AUTO: 130 THOUSANDS/UL (ref 149–390)
PLATELET BLD QL SMEAR: ADEQUATE
PMV BLD AUTO: 10.3 FL (ref 8.9–12.7)
POTASSIUM SERPL-SCNC: 4.9 MMOL/L (ref 3.5–5.3)
PROCALCITONIN SERPL-MCNC: 0.09 NG/ML
PROT SERPL-MCNC: 7.3 G/DL (ref 6.4–8.4)
PROT UR STRIP-MCNC: ABNORMAL MG/DL
PROTHROMBIN TIME: 14.9 SECONDS (ref 12.3–15)
QRS AXIS: 62 DEGREES
QRSD INTERVAL: 78 MS
QT INTERVAL: 322 MS
QTC INTERVAL: 413 MS
RBC # BLD AUTO: 4.66 MILLION/UL (ref 3.88–5.62)
RBC #/AREA URNS AUTO: NORMAL /HPF
RBC MORPH BLD: NORMAL
SARS-COV+SARS-COV-2 AG RESP QL IA.RAPID: NEGATIVE
SODIUM SERPL-SCNC: 139 MMOL/L (ref 135–147)
SP GR UR STRIP.AUTO: 1.02 (ref 1–1.03)
T WAVE AXIS: 1 DEGREES
UROBILINOGEN UR STRIP-ACNC: <2 MG/DL
VENTRICULAR RATE: 99 BPM
WBC # BLD AUTO: 7.6 THOUSAND/UL (ref 4.31–10.16)
WBC #/AREA URNS AUTO: NORMAL /HPF

## 2025-01-01 PROCEDURE — 87804 INFLUENZA ASSAY W/OPTIC: CPT

## 2025-01-01 PROCEDURE — 87811 SARS-COV-2 COVID19 W/OPTIC: CPT

## 2025-01-01 PROCEDURE — 83605 ASSAY OF LACTIC ACID: CPT

## 2025-01-01 PROCEDURE — 85610 PROTHROMBIN TIME: CPT

## 2025-01-01 PROCEDURE — 96375 TX/PRO/DX INJ NEW DRUG ADDON: CPT

## 2025-01-01 PROCEDURE — 81001 URINALYSIS AUTO W/SCOPE: CPT

## 2025-01-01 PROCEDURE — 96366 THER/PROPH/DIAG IV INF ADDON: CPT

## 2025-01-01 PROCEDURE — 80053 COMPREHEN METABOLIC PANEL: CPT

## 2025-01-01 PROCEDURE — 74019 RADEX ABDOMEN 2 VIEWS: CPT

## 2025-01-01 PROCEDURE — 87040 BLOOD CULTURE FOR BACTERIA: CPT

## 2025-01-01 PROCEDURE — 85025 COMPLETE CBC W/AUTO DIFF WBC: CPT

## 2025-01-01 PROCEDURE — 71047 X-RAY EXAM CHEST 3 VIEWS: CPT

## 2025-01-01 PROCEDURE — 84145 PROCALCITONIN (PCT): CPT

## 2025-01-01 PROCEDURE — 99285 EMERGENCY DEPT VISIT HI MDM: CPT

## 2025-01-01 PROCEDURE — 99223 1ST HOSP IP/OBS HIGH 75: CPT | Performed by: INTERNAL MEDICINE

## 2025-01-01 PROCEDURE — 99285 EMERGENCY DEPT VISIT HI MDM: CPT | Performed by: EMERGENCY MEDICINE

## 2025-01-01 PROCEDURE — 93005 ELECTROCARDIOGRAM TRACING: CPT

## 2025-01-01 PROCEDURE — 71045 X-RAY EXAM CHEST 1 VIEW: CPT

## 2025-01-01 PROCEDURE — 84484 ASSAY OF TROPONIN QUANT: CPT

## 2025-01-01 PROCEDURE — 96365 THER/PROPH/DIAG IV INF INIT: CPT

## 2025-01-01 PROCEDURE — 36415 COLL VENOUS BLD VENIPUNCTURE: CPT

## 2025-01-01 PROCEDURE — 96367 TX/PROPH/DG ADDL SEQ IV INF: CPT

## 2025-01-01 PROCEDURE — 85730 THROMBOPLASTIN TIME PARTIAL: CPT

## 2025-01-01 PROCEDURE — 94640 AIRWAY INHALATION TREATMENT: CPT

## 2025-01-01 RX ORDER — IPRATROPIUM BROMIDE AND ALBUTEROL SULFATE .5; 3 MG/3ML; MG/3ML
1 SOLUTION RESPIRATORY (INHALATION) ONCE
Status: COMPLETED | OUTPATIENT
Start: 2025-01-01 | End: 2025-01-01

## 2025-01-01 RX ORDER — SENNOSIDES 8.6 MG
17.2 TABLET ORAL
Status: DISCONTINUED | OUTPATIENT
Start: 2025-01-01 | End: 2025-01-06 | Stop reason: HOSPADM

## 2025-01-01 RX ORDER — LEVALBUTEROL INHALATION SOLUTION 0.63 MG/3ML
0.63 SOLUTION RESPIRATORY (INHALATION) ONCE
Status: COMPLETED | OUTPATIENT
Start: 2025-01-01 | End: 2025-01-01

## 2025-01-01 RX ORDER — GABAPENTIN 100 MG/1
100 CAPSULE ORAL
Status: DISCONTINUED | OUTPATIENT
Start: 2025-01-01 | End: 2025-01-06 | Stop reason: HOSPADM

## 2025-01-01 RX ORDER — IPRATROPIUM BROMIDE AND ALBUTEROL SULFATE 2.5; .5 MG/3ML; MG/3ML
3 SOLUTION RESPIRATORY (INHALATION) 3 TIMES DAILY PRN
Status: DISCONTINUED | OUTPATIENT
Start: 2025-01-01 | End: 2025-01-02

## 2025-01-01 RX ORDER — ACETAMINOPHEN 325 MG/1
650 TABLET ORAL EVERY 4 HOURS PRN
Status: DISCONTINUED | OUTPATIENT
Start: 2025-01-01 | End: 2025-01-06 | Stop reason: HOSPADM

## 2025-01-01 RX ORDER — DILTIAZEM HYDROCHLORIDE 5 MG/ML
10 INJECTION INTRAVENOUS ONCE
Status: COMPLETED | OUTPATIENT
Start: 2025-01-01 | End: 2025-01-01

## 2025-01-01 RX ORDER — METOPROLOL TARTRATE 25 MG/1
25 TABLET, FILM COATED ORAL EVERY 6 HOURS
Status: DISCONTINUED | OUTPATIENT
Start: 2025-01-01 | End: 2025-01-06 | Stop reason: HOSPADM

## 2025-01-01 RX ORDER — METOPROLOL TARTRATE 25 MG/1
25 TABLET, FILM COATED ORAL EVERY 6 HOURS
Status: DISCONTINUED | OUTPATIENT
Start: 2025-01-01 | End: 2025-01-01

## 2025-01-01 RX ORDER — ALBUTEROL SULFATE 0.83 MG/ML
2.5 SOLUTION RESPIRATORY (INHALATION) ONCE
Status: COMPLETED | OUTPATIENT
Start: 2025-01-01 | End: 2025-01-01

## 2025-01-01 RX ORDER — CHLORHEXIDINE GLUCONATE ORAL RINSE 1.2 MG/ML
15 SOLUTION DENTAL EVERY 12 HOURS SCHEDULED
Status: DISCONTINUED | OUTPATIENT
Start: 2025-01-01 | End: 2025-01-06 | Stop reason: HOSPADM

## 2025-01-01 RX ORDER — ACETAMINOPHEN 325 MG/1
650 TABLET ORAL 3 TIMES DAILY
Status: DISCONTINUED | OUTPATIENT
Start: 2025-01-01 | End: 2025-01-01

## 2025-01-01 RX ADMIN — GABAPENTIN 100 MG: 100 CAPSULE ORAL at 22:08

## 2025-01-01 RX ADMIN — CEFEPIME 2000 MG: 2 INJECTION, POWDER, FOR SOLUTION INTRAVENOUS at 11:44

## 2025-01-01 RX ADMIN — METOPROLOL TARTRATE 25 MG: 25 TABLET, FILM COATED ORAL at 17:40

## 2025-01-01 RX ADMIN — SENNOSIDES 17.2 MG: 8.6 TABLET, FILM COATED ORAL at 22:07

## 2025-01-01 RX ADMIN — IPRATROPIUM BROMIDE 0.5 MG: 0.5 SOLUTION RESPIRATORY (INHALATION) at 11:46

## 2025-01-01 RX ADMIN — LEVALBUTEROL HYDROCHLORIDE 0.63 MG: 0.63 SOLUTION RESPIRATORY (INHALATION) at 13:14

## 2025-01-01 RX ADMIN — ALBUTEROL SULFATE 2.5 MG: 2.5 SOLUTION RESPIRATORY (INHALATION) at 11:46

## 2025-01-01 RX ADMIN — DILTIAZEM HYDROCHLORIDE 10 MG: 5 INJECTION, SOLUTION INTRAVENOUS at 14:22

## 2025-01-01 RX ADMIN — Medication 3 MG: at 22:08

## 2025-01-01 RX ADMIN — ACETAMINOPHEN 650 MG: 325 TABLET, FILM COATED ORAL at 17:03

## 2025-01-01 RX ADMIN — VANCOMYCIN HYDROCHLORIDE 1750 MG: 5 INJECTION, POWDER, LYOPHILIZED, FOR SOLUTION INTRAVENOUS at 12:20

## 2025-01-01 RX ADMIN — SODIUM CHLORIDE 1000 ML: 0.9 INJECTION, SOLUTION INTRAVENOUS at 11:42

## 2025-01-01 RX ADMIN — APIXABAN 2.5 MG: 2.5 TABLET, FILM COATED ORAL at 17:40

## 2025-01-01 NOTE — ED ATTENDING ATTESTATION
1/1/2025  I, Noreen Tran MD, saw and evaluated the patient. I have discussed the patient with the resident/non-physician practitioner and agree with the resident's/non-physician practitioner's findings, Plan of Care, and MDM as documented in the resident's/non-physician practitioner's note, except where noted. All available labs and Radiology studies were reviewed.  I was present for key portions of any procedure(s) performed by the resident/non-physician practitioner and I was immediately available to provide assistance.       At this point I agree with the current assessment done in the Emergency Department.  I have conducted an independent evaluation of this patient a history and physical is as follows:    89-year-old male with history of dementia, A-fib with RVR on Eliquis.  Patient presents for evaluation of increased work of breathing from his skilled nursing facility.  He is not able to provide any history but is at his cognitive baseline per staff from the facility.  He was satting 88% on room air at the facility, was started on a nonrebreather and given a DuoNeb en route to the ED.     Physical Exam  Vitals and nursing note reviewed.   Constitutional:       General: He is not in acute distress.     Appearance: He is well-developed. He is ill-appearing. He is not toxic-appearing or diaphoretic.   HENT:      Head: Normocephalic and atraumatic.      Right Ear: External ear normal.      Left Ear: External ear normal.      Nose: Nose normal.   Eyes:      Conjunctiva/sclera: Conjunctivae normal.   Cardiovascular:      Rate and Rhythm: Normal rate and regular rhythm.      Pulses: Normal pulses.      Heart sounds: Normal heart sounds. No murmur heard.     No friction rub. No gallop.   Pulmonary:      Effort: Respiratory distress present.      Breath sounds: Wheezing (diffuse, end-expiratory, worse over the R lung fields) and rales (scattered) present.      Comments: Mildly tachpneic  Abdominal:      General:  Bowel sounds are normal. There is no distension.      Palpations: Abdomen is soft.      Tenderness: There is no abdominal tenderness. There is no guarding.   Musculoskeletal:         General: No deformity. Normal range of motion.      Cervical back: Normal range of motion and neck supple.      Comments: Race edema to the bilateral ankles, symmetric, no calf swelling or apparent tenderness   Skin:     General: Skin is warm and dry.   Neurological:      General: No focal deficit present.      Mental Status: He is alert.      Motor: No abnormal muscle tone.      Comments: Oriented to person only.  Follows basic commands.  Moves all 4 extremities spontaneously.  Face symmetric.  Speech clear.  At cognitive baseline per report from his facility.         ED Course  ED Course as of 01/01/25 1549   Wed Jan 01, 2025   1314 After discussion with Danisha, nurse at the patient's skilled nursing facility, he is at his baseline mental status.  He exhibits diffuse end expiratory wheezing, worse to the right lung fields with scattered Rales.  DuoNeb given on arrival, will give additional Xopenex.  I personally interpreted the patient's EKG which reveals A-fib with heart rate of 99 bpm, normal axis, normal intervals, no ST segment deviation, nonspecific T wave abnormality in leads III and aVF.  Patient is unable to provide any additional history as per his baseline.  Chest x-ray with concern for subtle infiltrate in the right lower lung fields.  Patient covered empirically with antibiotics on arrival as he meets SIRS criteria.  COVID, flu, and RSV negative.  Will admit to internal medicine for further management.  Patient is not currently requiring supplemental oxygen but did require supplemental O2 prehospital.    1352 Heart rate now in the 150s after receiving additional nebulizer treatment, suspect that this is the result of treatment with beta agonist.  Blood pressure remains stable.  Will continue to monitor.          Critical  Care Time  Procedures

## 2025-01-01 NOTE — ASSESSMENT & PLAN NOTE
Patient presenting initially with shortness of breath/tachypnea at his facility and was found to have O2 sats in the 80's with audible wheezing.  He received a total of 3 nebulizer treatments this morning with improvement.  He is now on room air saturating in the upper 90s.  No increased sputum production with cough, no history of COPD.  He has a home rescue inhaler but only uses it every couple of months per facility  Likely in the setting of A-fib with RVR or viral infection (although tested negative for COVID/Flu), as there are other sick residents with cough/rhinorrhea at the facility  Pro-Alfred and lactic acid within normal range.  Chest x-ray not showing any obvious infiltrates.  Patient is afebrile with no leukocytosis.  Received cefepime and vancomycin in the ER, at this time we will continue to monitor off antibiotics.  If patient develops a fever clinically worsens consider adding on antibiotics. Will order duo nebs PRN

## 2025-01-01 NOTE — SEPSIS NOTE
Sepsis Note   Satish Novak 89 y.o. male MRN: [id number]  Unit/Bed#: ED-02 Encounter: [id number]       Initial Sepsis Screening       Row Name 01/01/25 1323                Is the patient's history suggestive of a new or worsening infection? Yes (Proceed)  -MR        Suspected source of infection pneumonia  -MR        Indicate SIRS criteria Tachycardia > 90 bpm;Tachypnea > 20 resp per min  -MR        Are two or more of the above signs & symptoms of infection both present and new to the patient? Yes (Proceed)  -MR        Assess for evidence of organ dysfunction: Are any of the below criteria present within 6 hours of suspected infection and SIRS criteria that are NOT considered to be chronic conditions? --                  User Key  (r) = Recorded By, (t) = Taken By, (c) = Cosigned By      Initials Name Provider Type    MR Shasta Carrasco MD Resident                        Body mass index is 23 kg/m².  Wt Readings from Last 1 Encounters:   01/01/25 72.7 kg (160 lb 4.4 oz)        Ideal body weight: 73 kg (160 lb 15 oz)

## 2025-01-01 NOTE — ASSESSMENT & PLAN NOTE
Presenting with tachycardia and tachypnea.  Afebrile.  No leukocytosis.  Lactic acid within normal range.  Likely in the setting of A-fib with RVR or viral infection although he did not test positive for COVID/flu  Continue to monitor off antibiotics  Patient received 1 L fluid bolus in the ER, he appears euvolemic.  Will hold off on additional fluids and reassess need

## 2025-01-01 NOTE — ASSESSMENT & PLAN NOTE
History atrial fibrillation, on Eliquis 2.5 mg twice daily currently-confirmed with facility  Consider increasing to Eliquis 5 mg twice daily as patient does not meet the requirements for reduction in dosing  At home, patient takes Lopressor 37.5 mg twice daily.  After nebulizer therapies he was noted to be in A-fib with RVR rates in the 140s while hemodynamically stable.  He was given one-time dose of IV Cardizem 10 mg which he responded well during last admission in which he had A-fib with RVR during hospitalization for pneumonia  For now, will order Lopressor 25 mg every 6 hours and monitor on telemetry

## 2025-01-01 NOTE — H&P
H&P - Hospitalist   Name: Satish Novak 89 y.o. male I MRN: [id number]  Unit/Bed#: ED-02 I Date of Admission: 1/1/2025   Date of Service: 1/1/2025 I Hospital Day: 0     Assessment & Plan  Shortness of breath  Patient presenting initially with shortness of breath/tachypnea at his facility and was found to have O2 sats in the 80's with audible wheezing.  He received a total of 3 nebulizer treatments this morning with improvement.  He is now on room air saturating in the upper 90s.  No increased sputum production with cough, no history of COPD.  He has a home rescue inhaler but only uses it every couple of months per facility  Likely in the setting of A-fib with RVR or viral infection (although tested negative for COVID/Flu), as there are other sick residents with cough/rhinorrhea at the facility  Pro-Alfred and lactic acid within normal range.  Chest x-ray not showing any obvious infiltrates.  Patient is afebrile with no leukocytosis.  Received cefepime and vancomycin in the ER, at this time we will continue to monitor off antibiotics.  If patient develops a fever clinically worsens consider adding on antibiotics. Will order duo nebs PRN  Atrial fibrillation with RVR (HCC)  History atrial fibrillation, on Eliquis 2.5 mg twice daily currently-confirmed with facility  Consider increasing to Eliquis 5 mg twice daily as patient does not meet the requirements for reduction in dosing  At home, patient takes Lopressor 37.5 mg twice daily.  After nebulizer therapies he was noted to be in A-fib with RVR rates in the 140s while hemodynamically stable.  He was given one-time dose of IV Cardizem 10 mg which he responded well during last admission in which he had A-fib with RVR during hospitalization for pneumonia  For now, will order Lopressor 25 mg every 6 hours and monitor on telemetry  SIRS (systemic inflammatory response syndrome) (HCC)  Presenting with tachycardia and tachypnea.  Afebrile.  No leukocytosis.  Lactic acid within  normal range.  Likely in the setting of A-fib with RVR or viral infection although he did not test positive for COVID/flu  Continue to monitor off antibiotics  Patient received 1 L fluid bolus in the ER, he appears euvolemic.  Will hold off on additional fluids and reassess need  Dementia (HCC)  Spoke with [facility] staff member, at baseline patient understands his name but mostly uses word salad.  He does not understand the time, place, situation.  He is able to feed himself.  She reports that there were no acute mental changes noticed, he was at his baseline when EMS got there  Delirium precautions frequent reorienting.  Minimize overnight interruptions  Will order Tylenol 650 mg 3 times a day to ensure pain controlled   For severe agitation, consider Zyprexa 2.5 mg every 8 hours IM.  Per chart review, Seroquel 12.5 mg is listed as a nightly medication for patient however [facility] facility member confirms that he does not take that at all  Monitor for fecal and urinary retention  PT/OT  Chronic thrombocytopenia ([facility])  Platelets 130k, similar to prior values in the past.  No evidence of bleeding at this time.  Continue to monitor closely.  Liver enzymes within normal range.    VTE Pharmacologic Prophylaxis: VTE Score: 10 High Risk (Score >/= 5) - Pharmacological DVT Prophylaxis Ordered: apixaban (Eliquis). Sequential Compression Devices Ordered.  Code Status: Level 3 - DNAR and DNI   Discussion with family: Updated  (Teena who is his legal guardian) via phone.    Anticipated Length of Stay: Patient will be admitted on an inpatient basis with an anticipated length of stay of greater than 2 midnights secondary to acute respiratory distress, a fib with RVR.    History of Present Illness   Chief Complaint: Shortness of breath    Satish Novak is a 89 y.o. male with a PMH of dementia and atrial fibrillation on eliquis who presents from [facility] after he appeared to have trouble  breathing and his oxygen saturations were noted to be in the 80s per facility member.  I called [facility] and spoke with one of the care members.  She reported that Satish was in his normal state of health last night and this morning.  She reported that he was in the main room and was noted to have increased work of breathing and shortness of breath and EMS was called after his oxygen levels were checked. She noticed some wheezing as well and a nonproductive cough.  There are some other numbers at the facility who have upper respiratory symptoms.  At baseline with regards to his dementia, he understands his name but is not oriented to time, place, or situation. He frequently demonstrates word salad. She reports that he was completely at his baseline mentation status this morning when EMS was called.  He uses a walker and can feed himself. When EMS got there, patient was found sitting upright in a chair with a nonrebreather mask flow rate 10 L/min.  He received nebulizer treatment with EMS.  On arrival to the ER, his blood pressure was 126/72 and pulse was initially in the 80s.  He received additional nebulizer therapies with an increase in heart rate in the 130s and was found to be in A-fib with RVR.  He remains hemodynamically stable. Patient is a poor historian given his baseline dementia.  He was able to recognize his own name. Of note, patient has not had hospitalization last February which presented with hypoxia and increased work of breathing and required BiPAP therapy, he had multiple episodes of A-fib with RVR at the time and his Lopressor dose was increased to 37.5 mg twice daily.  Echo this past year showed ejection fraction 55%.    CBC not showing any signs of leukocytosis.  Hemoglobin stable.  Platelets 130K, consistent with prior values.  No signs of any bleeding at this time.  CMP notable for slightly elevated creatinine compared to value in October.  Electrolytes within normal range.   Lactic acid and Pro-Alfred within normal range.  Initial troponin 7.  Negative for COVID/flu.  Patient received 3 doses of nebulizer therapy and Tylenol this morning.  He also received a dose of vancomycin and cefepime 2 g in addition to 1 L fluid bolus.    Review of Systems   Reason unable to perform ROS: baseline dementia.       Historical Information   Past Medical History:   Diagnosis Date    Anxiety     Atrial fibrillation (HCC)     Dementia (HCC)     Disorder of kidney and ureter     Dupuytren's disease     Fibromatosis     Hyperlipidemia     Hypertension     PAF (paroxysmal atrial fibrillation) (HCC)     Thrombocytopenia (HCC)     Tinea unguium     Vascular disease      No past surgical history on file.  Social History     Tobacco Use    Smoking status: Never     Passive exposure: Never    Smokeless tobacco: Never   Vaping Use    Vaping status: Never Used   Substance and Sexual Activity    Alcohol use: Not Currently    Drug use: Never    Sexual activity: Not Currently     E-Cigarette/Vaping    E-Cigarette Use Never User      E-Cigarette/Vaping Substances    Nicotine No     THC No     CBD No     Flavoring No     Other No     Unknown No        Social History:  Marital Status: /Civil Union   Patient Pre-hospital Living Situation: Long Term Care Facility  Patient Pre-hospital Level of Mobility: walks with walker  Patient Pre-hospital Diet Restrictions: none     Meds/Allergies   I have reviewed home medications using recent Epic encounter.  Spoke with [facility] staff member and confirmed medications  Prior to Admission medications    Medication Sig Start Date End Date Taking? Authorizing Provider   acetaminophen (TYLENOL) 325 mg tablet Take 325 mg by mouth every 4 (four) hours as needed for mild pain 2 tablets    Historical Provider, MD   albuterol (2.5 mg/3 mL) 0.083 % nebulizer solution  3/8/23   Historical Provider, MD   aluminum-magnesium hydroxide-simethicone (MAALOX) 200-200-20 MG/5ML SUSP Take 30 mL by  mouth 4 (four) times a day (before meals and at bedtime)    Historical Provider, MD   apixaban (ELIQUIS) 5 mg Take 1 tablet (5 mg total) by mouth 2 (two) times a day 4/11/23   Sherly Hartman MD   chlorhexidine (PERIDEX) 0.12 % solution Apply 15 mL to the mouth or throat every 12 (twelve) hours 2/1/24   Roxy Ramos MD   cholecalciferol (VITAMIN D3) 1,000 units tablet Take 2 tablets (2,000 Units total) by mouth daily Do not start before April 12, 2023. 4/12/23   Sherly Hartman MD   cyanocobalamin (VITAMIN B-12) 2000 MCG tablet Take 1 tablet (2,000 mcg total) by mouth daily Do not start before April 12, 2023. 4/12/23   Sherly Hartman MD   gabapentin (NEURONTIN) 100 mg capsule Take 1 capsule (100 mg total) by mouth daily at bedtime 2/1/24   Roxy Ramos MD   melatonin 3 mg Take 1 tablet (3 mg total) by mouth daily at bedtime 2/1/24   Roxy Ramos MD   metoprolol tartrate 37.5 MG TABS Take 1 tablet (37.5 mg total) by mouth every 12 (twelve) hours 2/1/24   Roxy Ramos MD   QUEtiapine (SEROquel) 25 mg tablet Take 0.5 tablets (12.5 mg total) by mouth daily at bedtime 2/1/24   Roxy Ramos MD   senna (SENOKOT) 8.6 mg Take 2 tablets (17.2 mg total) by mouth daily at bedtime 2/1/24   Roxy Ramos MD     Allergies   Allergen Reactions    Levaquin [Levofloxacin] Other (See Comments)     unknown       Objective :  Temp:  [99.1 °F (37.3 °C)] 99.1 °F (37.3 °C)  HR:  [] 119  BP: (117-164)/(72-88) 164/88  Resp:  [28] 28  SpO2:  [94 %-97 %] 94 %  O2 Device: None (Room air)    Physical Exam  Vitals reviewed.   Constitutional:       Appearance: He is not diaphoretic.      Comments: Understands his name.  Is not oriented to place, time, situation.  Per discussion with [facility], patient is out of his baseline mentation status currently and this morning when EMS was called   HENT:      Head: Normocephalic and atraumatic.      Nose: No congestion  or rhinorrhea.      Mouth/Throat:      Mouth: Mucous membranes are dry.   Eyes:      Conjunctiva/sclera: Conjunctivae normal.   Cardiovascular:      Rate and Rhythm: Tachycardia present. Rhythm irregular.   Pulmonary:      Effort: Pulmonary effort is normal.      Comments: Saturating in the upper 90s on room air  No accessory muscle use  No rhonchi, wheezes, or rales  Abdominal:      Palpations: Abdomen is soft.      Tenderness: There is no abdominal tenderness. There is no guarding.   Musculoskeletal:      Comments: Trace pitting edema bilaterally  No tenderness upon calf squeeze  No significant erythema noted in the lower extremities   Skin:     General: Skin is warm and dry.   Neurological:      Mental Status: He is alert. Mental status is at baseline.      Comments: Moving all extremities spontaneously   Psychiatric:         Mood and Affect: Mood normal.         Behavior: Behavior normal.         Thought Content: Thought content normal.              Lab Results: I have reviewed the following results:  Results from last 7 days   Lab Units 01/01/25  1134   WBC Thousand/uL 7.60   HEMOGLOBIN g/dL 15.4   HEMATOCRIT % 47.6   PLATELETS Thousands/uL 130*   SEGS PCT % 58   LYMPHO PCT % 30   MONO PCT % 10   EOS PCT % 1     Results from last 7 days   Lab Units 01/01/25  1134   SODIUM mmol/L 139   POTASSIUM mmol/L 4.9   CHLORIDE mmol/L 105   CO2 mmol/L 28   BUN mg/dL 29*   CREATININE mg/dL 1.36*   ANION GAP mmol/L 6   CALCIUM mg/dL 9.3   ALBUMIN g/dL 3.8   TOTAL BILIRUBIN mg/dL 0.82   ALK PHOS U/L 70   ALT U/L 17   AST U/L 32   GLUCOSE RANDOM mg/dL 104     Results from last 7 days   Lab Units 01/01/25  1134   INR  1.10         Lab Results   Component Value Date    HGBA1C 5.8 (H) 04/05/2023    HGBA1C 5.9 (H) 12/20/2022    HGBA1C 5.9 (H) 06/20/2022     Results from last 7 days   Lab Units 01/01/25  1134   LACTIC ACID mmol/L 1.7   PROCALCITONIN ng/ml 0.09       Imaging Results Review: I reviewed radiology reports from this  admission including: chest xray.  Other Study Results Review: EKG was reviewed.     Administrative Statements       ** Please Note: This note has been constructed using a voice recognition system. **

## 2025-01-01 NOTE — ASSESSMENT & PLAN NOTE
Spoke with [facility] staff member, at baseline patient understands his name but mostly uses word salad.  He does not understand the time, place, situation.  He is able to feed himself.  She reports that there were no acute mental changes noticed, he was at his baseline when EMS got there  Delirium precautions frequent reorienting.  Minimize overnight interruptions  Will order Tylenol 650 mg 3 times a day to ensure pain controlled   For severe agitation, consider Zyprexa 2.5 mg every 8 hours IM.  Per chart review, Seroquel 12.5 mg is listed as a nightly medication for patient however [facility] facility member confirms that he does not take that at all  Monitor for fecal and urinary retention  PT/OT

## 2025-01-01 NOTE — ASSESSMENT & PLAN NOTE
Platelets 130k, similar to prior values in the past.  No evidence of bleeding at this time.  Continue to monitor closely.  Liver enzymes within normal range.

## 2025-01-01 NOTE — ED PROVIDER NOTES
Time reflects when diagnosis was documented in both MDM as applicable and the Disposition within this note       Time User Action Codes Description Comment    1/1/2025  1:12 PM Shasta Carrasco Add [R06.03] Respiratory distress     1/1/2025  1:12 PM Shasta Carrasco Add [R06.2] Wheezing     1/1/2025  1:20 PM Shasta Carrasco Add [J18.9] PNA (pneumonia)     1/1/2025  2:19 PM Shasta Carrasco Add [I48.91] Atrial fibrillation with RVR (HCC)           ED Disposition       ED Disposition   Admit    Condition   Stable    Date/Time   Wed Jan 1, 2025  1:19 PM    Comment   Case was discussed with Dr. Rausch and the patient's admission status was agreed to be Admission Status: inpatient status to the service of Dr. rausch .               Assessment & Plan       Medical Decision Making  89yoM with afib on eliquis, dementia, presents from [facility] for evaluation of respiratory distress. Facility noted him to be breathing rapidly and wheezing, O2sat read 88%. He was placed on a NRB at the facility and sats came up to the 90s. EMS put him on NRB and gave duoneb on the way and patient was able to be maintain O2 sats on RA. On arrival he is tachypneic to 28, with diffuse wheezing throughout both lung fields, satting 96% on RA.     Will get CXR to evaluate for PNA, PTX, or pleural effusion. Sepsis workup. Will repeat duoneb here and give steroids. See ED course for time stamped updates.     Amount and/or Complexity of Data Reviewed  Labs: ordered. Decision-making details documented in ED Course.  Radiology: ordered and independent interpretation performed.    Risk  Prescription drug management.  Decision regarding hospitalization.        ED Course as of 01/04/25 1503   Wed Jan 01, 2025   1132 Presents from [facility] in acute respiratory distress. Per EMS was satting 88% on RA at facility, placed on NRB with improvement to 90s. Given duoneb in route and able to be taken off NRB to RA satting mid 90s. RR 28. HR  114. O2sat 96% in ED. Diffuse wheezing. No hx of COPD or asthma. Dementia, patient confused at baseline per EMS. Unable to answer basic questions in the room. On eliquis, no signs of head trauma. Sepsis workup. 1L NS (last echo Jan 2024 and EF 55%). Cefepime and vanco since coming from a facility.    1250 Wheezing improved after neb. Patient satting 98% on RA. RR 28 still.    1252 Creatinine(!): 1.36  Cre was 1.12 two months ago   1301 LACTIC ACID: 1.7   1359 Called [facility], spoke to EDWIN Raman who confirms that patient is at baseline mental status.        Medications   chlorhexidine (PERIDEX) 0.12 % oral rinse 15 mL (15 mL Mouth/Throat Given 1/2/25 2130)   gabapentin (NEURONTIN) capsule 100 mg (100 mg Oral Given 1/2/25 2130)   melatonin tablet 3 mg (3 mg Oral Given 1/2/25 2128)   senna (SENOKOT) tablet 17.2 mg (17.2 mg Oral Given 1/2/25 2130)   metoprolol tartrate (LOPRESSOR) tablet 25 mg (25 mg Oral Given 1/2/25 1228)   acetaminophen (TYLENOL) tablet 650 mg (has no administration in time range)   ipratropium (ATROVENT) 0.02 % inhalation solution 0.5 mg (0.5 mg Nebulization Given 1/2/25 2000)   metoprolol (LOPRESSOR) injection 5 mg (has no administration in time range)   ipratropium-albuterol (FOR EMS ONLY) (DUO-NEB) 0.5-2.5 mg/3 mL inhalation solution 3 mL (0 mL Does not apply Given to EMS 1/1/25 1127)   sodium chloride 0.9 % bolus 1,000 mL (0 mL Intravenous Stopped 1/1/25 1421)   cefepime (MAXIPIME) 2 g/50 mL dextrose IVPB (0 mg Intravenous Stopped 1/1/25 1220)   vancomycin (VANCOCIN) 1,750 mg in sodium chloride 0.9 % 500 mL IVPB (0 mg Intravenous Stopped 1/1/25 1421)   albuterol inhalation solution 2.5 mg (2.5 mg Nebulization Given 1/1/25 1146)   ipratropium (ATROVENT) 0.02 % inhalation solution 0.5 mg (0.5 mg Nebulization Given 1/1/25 1146)   levalbuterol (XOPENEX) inhalation solution 0.63 mg (0.63 mg Nebulization Given 1/1/25 1314)   diltiazem (CARDIZEM) injection 10 mg (10 mg Intravenous Given 1/1/25 1422)        ED Risk Strat Scores                                              History of Present Illness       Chief Complaint   Patient presents with    Shortness of Breath     Pt from [facility]. Per EMS, pt O2 was 88% on RA. (+) Expiratory wheezing on arrival. Pt given one duoneb prehospital.        Past Medical History:   Diagnosis Date    Anxiety     Atrial fibrillation (HCC)     Dementia (HCC)     Disorder of kidney and ureter     Dupuytren's disease     Fibromatosis     Hyperlipidemia     Hypertension     PAF (paroxysmal atrial fibrillation) (HCC)     Thrombocytopenia (HCC)     Tinea unguium     Vascular disease       No past surgical history on file.   Family History   Problem Relation Age of Onset    No Known Problems Mother     No Known Problems Father       Social History     Tobacco Use    Smoking status: Never     Passive exposure: Never    Smokeless tobacco: Never   Vaping Use    Vaping status: Never Used   Substance Use Topics    Alcohol use: Not Currently    Drug use: Never      E-Cigarette/Vaping    E-Cigarette Use Never User       E-Cigarette/Vaping Substances    Nicotine No     THC No     CBD No     Flavoring No     Other No     Unknown No       I have reviewed and agree with the history as documented.     89yoM with afib on eliquis, dementia, presents from [facility] for evaluation of respiratory distress. I called and spoke with RN Danisha from [facility] who says patient was coming down for breakfast, noticed to be breathing fast and wheezing, O2sat read 88%. He was placed on a NRB at the facility and sats came up to the 90s. EMS put him on NRB and gave duoneb on the way and patient was able to be maintain O2 sats on RA. On arrival he is tachypneic to 28, with diffuse wheezing throughout both lung fields, satting 96% on RA. He is only oriented to self. Danisha says this is his baseline. There was no reports of fall or headstrike. On review of patient's chart, cannot find any indication that  he has known lung disease, though was admitted last year to ICU after similar episode of respiratory distress requiring bipap.           Review of Systems   Unable to perform ROS: Dementia           Objective       ED Triage Vitals   Temperature Pulse Blood Pressure Respirations SpO2 Patient Position - Orthostatic VS   01/01/25 1126 01/01/25 1126 01/01/25 1126 01/01/25 1126 01/01/25 1126 01/01/25 1215   99.1 °F (37.3 °C) 85 126/72 (!) 28 96 % Sitting      Temp Source Heart Rate Source BP Location FiO2 (%) Pain Score    01/01/25 1126 01/01/25 1126 01/01/25 1215 -- 01/02/25 0220    Oral Monitor Left arm  No Pain      Vitals      Date and Time Temp Pulse SpO2 Resp BP Pain Score FACES Pain Rating User   01/04/25 0809 -- -- 96 % -- -- -- -- JK   01/04/25 0743 -- 81 -- -- -- -- -- LC   01/04/25 0743 -- -- 99 % -- 136/77 -- -- DII   01/04/25 0228 -- 67 97 % -- 122/81 -- -- DII   01/03/25 2315 -- -- 97 % -- -- -- -- NA   01/03/25 2029 -- -- -- -- 143/85 -- -- NA   01/03/25 2029 -- 101 -- -- -- -- --    01/03/25 2026 -- -- -- -- 140/70 -- -- SW   01/03/25 1752 -- -- -- -- 108/58 -- -- AR   01/03/25 1743 -- 87 94 % -- 80/67 -- -- DII   01/03/25 1317 -- -- 94 % -- -- -- -- BM   01/03/25 1141 -- 101 94 % -- 134/80 -- -- DII   01/03/25 0749 -- -- 94 % -- -- -- -- BM   01/03/25 0731 98.4 °F (36.9 °C) 89 93 % -- 143/71 -- -- DII   01/03/25 0730 -- -- -- -- -- No Pain -- LK   01/03/25 0633 -- 108 -- -- 136/84 -- -- LB   01/03/25 0106 -- -- 92 % -- -- -- -- KN   01/02/25 2342 -- 113 -- -- 142/87 -- -- CO   01/02/25 2339 -- 92 92 % -- 142/87 -- -- DII   01/02/25 2148 -- -- -- 20 -- -- -- AC   01/02/25 2148 98.9 °F (37.2 °C) 95 95 % -- 116/78 -- -- DII   01/02/25 2119 98.3 °F (36.8 °C) 88 95 % -- 156/72 -- -- HR   01/02/25 1753 -- 89 95 % -- 143/87 -- -- HR   01/02/25 1500 -- -- -- 20 -- No Pain -- AN   01/02/25 1500 -- 93 99 % -- 157/70 -- -- HR   01/02/25 1200 -- 100 95 % 24 141/79 -- -- LR   01/02/25 0609 -- 96 -- -- 147/75 -- --  MM   01/02/25 0220 -- -- -- -- -- No Pain -- MM   01/02/25 0200 -- 85 95 % 22 103/58 -- -- MM   01/02/25 0045 -- 86 96 % 22 134/76 -- -- MM   01/02/25 0032 -- 105 -- -- 134/76 -- -- MM   01/01/25 1830 -- 87 97 % 22 108/51 -- --    01/01/25 1808 -- 99 98 % 22 121/59 -- --    01/01/25 1740 -- 110 -- -- 132/67 -- -- SE   01/01/25 1700 -- 103 95 % 22 116/85 -- --    01/01/25 1630 -- 93 94 % 22 130/56 -- --    01/01/25 1600 -- 97 94 % 22 114/56 -- --    01/01/25 1551 -- 112 97 % 22 114/56 -- --    01/01/25 1515 -- 98 94 % -- -- -- -- SE   01/01/25 1415 -- 119 94 % -- 164/88 -- -- DB   01/01/25 1315 -- 131 97 % -- 117/76 -- -- DB   01/01/25 1215 -- 107 96 % 28 132/73 -- -- DB   01/01/25 1129 -- 114 -- -- -- -- -- DB   01/01/25 1126 99.1 °F (37.3 °C) 85 96 % 28 126/72 -- -- DB            Physical Exam  Vitals and nursing note reviewed.   Constitutional:       General: He is in acute distress.      Appearance: He is well-developed. He is ill-appearing. He is not toxic-appearing or diaphoretic.      Interventions: He is not intubated.  HENT:      Head: Normocephalic and atraumatic.   Eyes:      Conjunctiva/sclera: Conjunctivae normal.   Cardiovascular:      Rate and Rhythm: Normal rate and regular rhythm.      Heart sounds: No murmur heard.  Pulmonary:      Effort: Pulmonary effort is normal. Tachypnea present. No bradypnea, accessory muscle usage or respiratory distress. He is not intubated.      Breath sounds: No stridor. Examination of the right-upper field reveals wheezing. Examination of the left-upper field reveals wheezing. Examination of the right-middle field reveals wheezing. Examination of the left-middle field reveals wheezing. Examination of the right-lower field reveals wheezing. Examination of the left-lower field reveals wheezing. Wheezing present. No decreased breath sounds, rhonchi or rales.   Chest:      Chest wall: No mass, tenderness, crepitus or edema.   Abdominal:      Palpations: Abdomen  is soft.      Tenderness: There is no abdominal tenderness. There is no guarding or rebound.   Musculoskeletal:         General: No swelling.      Cervical back: Neck supple.      Right lower leg: No tenderness. No edema.      Left lower leg: No tenderness. No edema.   Skin:     General: Skin is warm and dry.      Capillary Refill: Capillary refill takes less than 2 seconds.      Coloration: Skin is not cyanotic or pale.      Findings: No ecchymosis, erythema or rash.      Nails: There is no clubbing.   Neurological:      Mental Status: He is alert. Mental status is at baseline.      GCS: GCS eye subscore is 4. GCS verbal subscore is 4. GCS motor subscore is 6.      Motor: Motor function is intact.   Psychiatric:         Mood and Affect: Mood normal.         Results Reviewed       Procedure Component Value Units Date/Time    Blood culture #1 [[id number]] Collected: 01/01/25 1134    Lab Status: Preliminary result Specimen: Blood from Arm, Right Updated: 01/03/25 2101     Blood Culture No Growth at 48 hrs.    Blood culture #2 [[id number]] Collected: 01/01/25 1141    Lab Status: Preliminary result Specimen: Blood from Arm, Left Updated: 01/03/25 2101     Blood Culture No Growth at 48 hrs.    CBC and Platelet [[id number]]  (Abnormal) Collected: 01/03/25 0447    Lab Status: Final result Specimen: Blood from Hand, Right Updated: 01/03/25 0712     WBC 5.76 Thousand/uL      RBC 4.15 Million/uL      Hemoglobin 13.9 g/dL      Hematocrit 41.9 %       fL      MCH 33.5 pg      MCHC 33.2 g/dL      RDW 14.0 %      Platelets 101 Thousands/uL      MPV 10.5 fL     Basic metabolic panel [[id number]] Collected: 01/03/25 0447    Lab Status: Final result Specimen: Blood from Hand, Right Updated: 01/03/25 0614     Sodium 137 mmol/L      Potassium 4.0 mmol/L      Chloride 106 mmol/L      CO2 23 mmol/L      ANION GAP 8 mmol/L      BUN 21 mg/dL      Creatinine 1.05 mg/dL      Glucose 86 mg/dL      Calcium 8.5 mg/dL      eGFR 60  ml/min/1.73sq m     Narrative:      National Kidney Disease Foundation guidelines for Chronic Kidney Disease (CKD):     Stage 1 with normal or high GFR (GFR > 90 mL/min/1.73 square meters)    Stage 2 Mild CKD (GFR = 60-89 mL/min/1.73 square meters)    Stage 3A Moderate CKD (GFR = 45-59 mL/min/1.73 square meters)    Stage 3B Moderate CKD (GFR = 30-44 mL/min/1.73 square meters)    Stage 4 Severe CKD (GFR = 15-29 mL/min/1.73 square meters)    Stage 5 End Stage CKD (GFR <15 mL/min/1.73 square meters)  Note: GFR calculation is accurate only with a steady state creatinine    Magnesium [[id number]]  (Abnormal) Collected: 01/03/25 0447    Lab Status: Final result Specimen: Blood from Hand, Right Updated: 01/03/25 0614     Magnesium 1.7 mg/dL     Respiratory Panel 2.1(RP2)with COVID19 [[id number]]  (Abnormal) Collected: 01/02/25 1231    Lab Status: Final result Specimen: Nasopharyngeal Swab Updated: 01/02/25 1719     Adenovirus Not Detected     Bordetella parapertussis Not Detected     Bordetella pertussis Not Detected     Chlamydia pneumoniae Not Detected     SARS-CoV-2 Not Detected     Coronavirus 229E Not Detected     Coronavirus HKU1 Not Detected     Coronavirus NL63 Not Detected     Coronavirus OC43 Detected     Human Metapneumovirus Not Detected     Rhino/Enterovirus Not Detected     Influenza A Not Detected     Influenza B Not Detected     Mycoplasma pneumoniae Not Detected     Parainfluenza 1 Not Detected     Parainfluenza 2 Not Detected     Parainfluenza 3 Not Detected     Parainfluenza 4 Not Detected     Respiratory Syncytial Virus Not Detected    CBC and differential [[id number]]  (Abnormal) Collected: 01/02/25 0620    Lab Status: Final result Specimen: Blood from Arm, Right Updated: 01/02/25 0815     WBC 5.75 Thousand/uL      RBC 4.22 Million/uL      Hemoglobin 13.8 g/dL      Hematocrit 43.4 %       fL      MCH 32.7 pg      MCHC 31.8 g/dL      RDW 14.2 %      MPV 10.3 fL      Platelets 93 Thousands/uL       nRBC 0 /100 WBCs      Segmented % 52 %      Immature Grans % 1 %      Lymphocytes % 32 %      Monocytes % 13 %      Eosinophils Relative 2 %      Basophils Relative 0 %      Absolute Neutrophils 2.97 Thousands/µL      Absolute Immature Grans 0.04 Thousand/uL      Absolute Lymphocytes 1.84 Thousands/µL      Absolute Monocytes 0.76 Thousand/µL      Eosinophils Absolute 0.12 Thousand/µL      Basophils Absolute 0.02 Thousands/µL     Narrative:      This is an appended report.  These results have been appended to a previously verified report.    Smear Review(Phlebs Do Not Order) [[id number]]  (Abnormal) Collected: 01/02/25 0620    Lab Status: Final result Specimen: Blood from Arm, Right Updated: 01/02/25 0815     RBC Morphology Normal     Platelet Estimate Decreased    Procalcitonin [[id number]]  (Normal) Collected: 01/02/25 0620    Lab Status: Final result Specimen: Blood from Arm, Right Updated: 01/02/25 0705     Procalcitonin 0.11 ng/ml     Basic metabolic panel [[id number]]  (Abnormal) Collected: 01/02/25 0620    Lab Status: Final result Specimen: Blood from Arm, Right Updated: 01/02/25 0654     Sodium 139 mmol/L      Potassium 4.6 mmol/L      Chloride 108 mmol/L      CO2 25 mmol/L      ANION GAP 6 mmol/L      BUN 26 mg/dL      Creatinine 1.17 mg/dL      Glucose 90 mg/dL      Calcium 8.7 mg/dL      eGFR 53 ml/min/1.73sq m     Narrative:      National Kidney Disease Foundation guidelines for Chronic Kidney Disease (CKD):     Stage 1 with normal or high GFR (GFR > 90 mL/min/1.73 square meters)    Stage 2 Mild CKD (GFR = 60-89 mL/min/1.73 square meters)    Stage 3A Moderate CKD (GFR = 45-59 mL/min/1.73 square meters)    Stage 3B Moderate CKD (GFR = 30-44 mL/min/1.73 square meters)    Stage 4 Severe CKD (GFR = 15-29 mL/min/1.73 square meters)    Stage 5 End Stage CKD (GFR <15 mL/min/1.73 square meters)  Note: GFR calculation is accurate only with a steady state creatinine    Lactic acid, plasma (w/reflex if result > 2.0)  [[id number]]  (Normal) Collected: 01/02/25 0620    Lab Status: Final result Specimen: Blood from Arm, Right Updated: 01/02/25 0654     LACTIC ACID 1.1 mmol/L     Narrative:      Result may be elevated if tourniquet was used during collection.    Urine Microscopic [[id number]]  (Normal) Collected: 01/01/25 1739    Lab Status: Final result Specimen: Urine, Straight Cath Updated: 01/01/25 1806     RBC, UA 1-2 /hpf      WBC, UA 1-2 /hpf      Epithelial Cells Occasional /hpf      Bacteria, UA None Seen /hpf     UA w Reflex to Microscopic w Reflex to Culture [[id number]]  (Abnormal) Collected: 01/01/25 1739    Lab Status: Final result Specimen: Urine, Straight Cath Updated: 01/01/25 1755     Color, UA Light Yellow     Clarity, UA Clear     Specific Gravity, UA 1.019     pH, UA 5.5     Leukocytes, UA Negative     Nitrite, UA Negative     Protein, UA Trace mg/dl      Glucose, UA Negative mg/dl      Ketones, UA Negative mg/dl      Urobilinogen, UA <2.0 mg/dl      Bilirubin, UA Negative     Occult Blood, UA Negative    HS Troponin I 2hr [[id number]]  (Normal) Collected: 01/01/25 1424    Lab Status: Final result Specimen: Blood from Arm, Right Updated: 01/01/25 1458     hs TnI 2hr 7 ng/L      Delta 2hr hsTnI 0 ng/L     CBC and differential [[id number]]  (Abnormal) Collected: 01/01/25 1134    Lab Status: Final result Specimen: Blood from Arm, Right Updated: 01/01/25 1257     WBC 7.60 Thousand/uL      RBC 4.66 Million/uL      Hemoglobin 15.4 g/dL      Hematocrit 47.6 %       fL      MCH 33.0 pg      MCHC 32.4 g/dL      RDW 14.4 %      MPV 10.3 fL      Platelets 130 Thousands/uL      nRBC 0 /100 WBCs      Segmented % 58 %      Immature Grans % 1 %      Lymphocytes % 30 %      Monocytes % 10 %      Eosinophils Relative 1 %      Basophils Relative 0 %      Absolute Neutrophils 4.46 Thousands/µL      Absolute Immature Grans 0.07 Thousand/uL      Absolute Lymphocytes 2.27 Thousands/µL      Absolute Monocytes 0.74 Thousand/µL       Eosinophils Absolute 0.04 Thousand/µL      Basophils Absolute 0.02 Thousands/µL     Narrative:      This is an appended report.  These results have been appended to a previously verified report.    Smear Review(Phlebs Do Not Order) [[id number]] Collected: 01/01/25 1134    Lab Status: Final result Specimen: Blood from Arm, Right Updated: 01/01/25 1257     RBC Morphology Normal     Platelet Estimate Adequate    Protime-INR [[id number]]  (Normal) Collected: 01/01/25 1134    Lab Status: Final result Specimen: Blood from Arm, Right Updated: 01/01/25 1244     Protime 14.9 seconds      INR 1.10    Narrative:      INR Therapeutic Range    Indication                                             INR Range      Atrial Fibrillation                                               2.0-3.0  Hypercoagulable State                                    2.0.2.3  Left Ventricular Asist Device                            2.0-3.0  Mechanical Heart Valve                                  -    Aortic(with afib, MI, embolism, HF, LA enlargement,    and/or coagulopathy)                                     2.0-3.0 (2.5-3.5)     Mitral                                                             2.5-3.5  Prosthetic/Bioprosthetic Heart Valve               2.0-3.0  Venous thromboembolism (VTE: VT, PE        2.0-3.0    APTT [[id number]]  (Normal) Collected: 01/01/25 1134    Lab Status: Final result Specimen: Blood from Arm, Right Updated: 01/01/25 1244     PTT 32 seconds     Procalcitonin [[id number]]  (Normal) Collected: 01/01/25 1134    Lab Status: Final result Specimen: Blood from Arm, Right Updated: 01/01/25 1226     Procalcitonin 0.09 ng/ml     HS Troponin 0hr (reflex protocol) [[id number]]  (Normal) Collected: 01/01/25 1134    Lab Status: Final result Specimen: Blood from Arm, Right Updated: 01/01/25 1226     hs TnI 0hr 7 ng/L     Comprehensive metabolic panel [[id number]]  (Abnormal) Collected: 01/01/25 1134    Lab Status: Final result Specimen: Blood  from Arm, Right Updated: 01/01/25 1214     Sodium 139 mmol/L      Potassium 4.9 mmol/L      Chloride 105 mmol/L      CO2 28 mmol/L      ANION GAP 6 mmol/L      BUN 29 mg/dL      Creatinine 1.36 mg/dL      Glucose 104 mg/dL      Calcium 9.3 mg/dL      AST 32 U/L      ALT 17 U/L      Alkaline Phosphatase 70 U/L      Total Protein 7.3 g/dL      Albumin 3.8 g/dL      Total Bilirubin 0.82 mg/dL      eGFR 44 ml/min/1.73sq m     Narrative:      National Kidney Disease Foundation guidelines for Chronic Kidney Disease (CKD):     Stage 1 with normal or high GFR (GFR > 90 mL/min/1.73 square meters)    Stage 2 Mild CKD (GFR = 60-89 mL/min/1.73 square meters)    Stage 3A Moderate CKD (GFR = 45-59 mL/min/1.73 square meters)    Stage 3B Moderate CKD (GFR = 30-44 mL/min/1.73 square meters)    Stage 4 Severe CKD (GFR = 15-29 mL/min/1.73 square meters)    Stage 5 End Stage CKD (GFR <15 mL/min/1.73 square meters)  Note: GFR calculation is accurate only with a steady state creatinine    Lactic acid [[id number]]  (Normal) Collected: 01/01/25 1134    Lab Status: Final result Specimen: Blood from Arm, Right Updated: 01/01/25 1210     LACTIC ACID 1.7 mmol/L     Narrative:      Result may be elevated if tourniquet was used during collection.    FLU/COVID Rapid Antigen (30 min. TAT) - Preferred screening test in ED [[id number]]  (Normal) Collected: 01/01/25 1142    Lab Status: Final result Specimen: Nares from Nose Updated: 01/01/25 1209     SARS COV Rapid Antigen Negative     Influenza A Rapid Antigen Negative     Influenza B Rapid Antigen Negative    Narrative:      This test has been performed using the [employer] Ofelia 2 FLU+SARS Antigen test under the Emergency Use Authorization (EUA). This test has been validated by the  and verified by the performing laboratory. The Ofelia uses lateral flow immunofluorescent sandwich assay to detect SARS-COV, Influenza A and Influenza B Antigen.     The Quidel Ofelia 2 SARS Antigen test does not  differentiate between SARS-CoV and SARS-CoV-2.     Negative results are presumptive and may be confirmed with a molecular assay, if necessary, for patient management. Negative results do not rule out SARS-CoV-2 or influenza infection and should not be used as the sole basis for treatment or patient management decisions. A negative test result may occur if the level of antigen in a sample is below the limit of detection of this test.     Positive results are indicative of the presence of viral antigens, but do not rule out bacterial infection or co-infection with other viruses.     All test results should be used as an adjunct to clinical observations and other information available to the provider.    FOR PEDIATRIC PATIENTS - copy/paste COVID Guidelines URL to browser: [email or web address]            XR chest with decubitus left   Final Interpretation by Tre Tompkins DO (01/02 1754)      No evidence acute cardiopulmonary disease on this single decubitus view.      No evidence of pneumoperitoneum.                  Resident: Francis Nolasco I, the attending radiologist, have reviewed the images and agree with the final report above.      Workstation performed: [id number]         XR abdomen complete inc upright and/or decubitus   Final Interpretation by Tre Tompkins DO (01/02 1214)      Nonobstructive bowel gas pattern.      No evidence of pneumoperitoneum.               Resident: Francis Nolasco I, the attending radiologist, have reviewed the images and agree with the final report above.      Workstation performed: [id number]         XR chest 1 view portable   ED Interpretation by Noreen Tran MD (01/01 1314)   Questionable infiltrate to the R lower lung fields.       Final Interpretation by Giovani Cid MD (01/01 1726)   Curvilinear density over the right hemidiaphragm may represent atelectasis although motion artifact/free air is  possible. A repeat obstruction series with left lateral decubitus view is advised.      This was discussed with Dr. Peralta at 5:15 p.m. via secure text            Workstation performed: [id number]             ECG 12 Lead Documentation Only    Date/Time: 1/1/2025 8:40 PM    Performed by: Shasta Carrasco MD  Authorized by: Shasta Carrasco MD    Indications / Diagnosis:  Respiratory distress  ECG reviewed by me, the ED Provider: yes    Patient location:  ED  Previous ECG:     Previous ECG:  Compared to current  Rate:     ECG rate:  99    ECG rate assessment: normal    Rhythm:     Rhythm: atrial fibrillation    Ectopy:     Ectopy: none    QRS:     QRS axis:  Normal    QRS intervals:  Normal  Conduction:     Conduction: normal    ST segments:     ST segments:  Normal  T waves:     T waves: non-specific    Comments:      Qtc 413      ED Medication and Procedure Management   Prior to Admission Medications   Prescriptions Last Dose Informant Patient Reported? Taking?   QUEtiapine (SEROquel) 25 mg tablet   No No   Sig: Take 0.5 tablets (12.5 mg total) by mouth daily at bedtime   acetaminophen (TYLENOL) 325 mg tablet  Outside Facility (Specify) Yes No   Sig: Take 325 mg by mouth every 4 (four) hours as needed for mild pain 2 tablets   albuterol (2.5 mg/3 mL) 0.083 % nebulizer solution  Outside Facility (Specify) Yes No   aluminum-magnesium hydroxide-simethicone (MAALOX) 200-200-20 MG/5ML SUSP  Outside Facility (Specify) Yes No   Sig: Take 30 mL by mouth 4 (four) times a day (before meals and at bedtime)   apixaban (ELIQUIS) 5 mg  Outside Facility (Specify) No No   Sig: Take 1 tablet (5 mg total) by mouth 2 (two) times a day   chlorhexidine (PERIDEX) 0.12 % solution   No No   Sig: Apply 15 mL to the mouth or throat every 12 (twelve) hours   cholecalciferol (VITAMIN D3) 1,000 units tablet  Outside Facility (Specify) No No   Sig: Take 2 tablets (2,000 Units total) by mouth daily Do not start before April 12, 2023.    cyanocobalamin (VITAMIN B-12) 2000 MCG tablet  Outside Facility (Specify) No No   Sig: Take 1 tablet (2,000 mcg total) by mouth daily Do not start before April 12, 2023.   gabapentin (NEURONTIN) 100 mg capsule   No No   Sig: Take 1 capsule (100 mg total) by mouth daily at bedtime   melatonin 3 mg   No No   Sig: Take 1 tablet (3 mg total) by mouth daily at bedtime   metoprolol tartrate 37.5 MG TABS   No No   Sig: Take 1 tablet (37.5 mg total) by mouth every 12 (twelve) hours   senna (SENOKOT) 8.6 mg   No No   Sig: Take 2 tablets (17.2 mg total) by mouth daily at bedtime      Facility-Administered Medications: None     Current Discharge Medication List        CONTINUE these medications which have NOT CHANGED    Details   acetaminophen (TYLENOL) 325 mg tablet Take 325 mg by mouth every 4 (four) hours as needed for mild pain 2 tablets      albuterol (2.5 mg/3 mL) 0.083 % nebulizer solution       aluminum-magnesium hydroxide-simethicone (MAALOX) 200-200-20 MG/5ML SUSP Take 30 mL by mouth 4 (four) times a day (before meals and at bedtime)      apixaban (ELIQUIS) 5 mg Take 1 tablet (5 mg total) by mouth 2 (two) times a day  Refills: 0    Associated Diagnoses: PAF (paroxysmal atrial fibrillation) ([facility])      chlorhexidine (PERIDEX) 0.12 % solution Apply 15 mL to the mouth or throat every 12 (twelve) hours    Associated Diagnoses: Multifocal pneumonia      cholecalciferol (VITAMIN D3) 1,000 units tablet Take 2 tablets (2,000 Units total) by mouth daily Do not start before April 12, 2023.  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)      gabapentin (NEURONTIN) 100 mg capsule Take 1 capsule (100 mg total) by mouth daily at bedtime    Associated  Diagnoses: Dementia (HCC)      melatonin 3 mg Take 1 tablet (3 mg total) by mouth daily at bedtime    Associated Diagnoses: Dementia (HCC)      metoprolol tartrate 37.5 MG TABS Take 1 tablet (37.5 mg total) by mouth every 12 (twelve) hours    Associated Diagnoses: Atrial fibrillation (HCC)      QUEtiapine (SEROquel) 25 mg tablet Take 0.5 tablets (12.5 mg total) by mouth daily at bedtime    Associated Diagnoses: Dementia (HCC)      senna (SENOKOT) 8.6 mg Take 2 tablets (17.2 mg total) by mouth daily at bedtime    Associated Diagnoses: Constipated           No discharge procedures on file.  ED SEPSIS DOCUMENTATION   Time reflects when diagnosis was documented in both MDM as applicable and the Disposition within this note       Time User Action Codes Description Comment    1/1/2025  1:12 PM Shasta Carrasco [R06.03] Respiratory distress     1/1/2025  1:12 PM Shasta Carrasco [R06.2] Wheezing     1/1/2025  1:20 PM Shasta Carrasco [J18.9] PNA (pneumonia)     1/1/2025  2:19 PM Shasta Carrasco [I48.91] Atrial fibrillation with RVR (HCC)            Initial Sepsis Screening       Row Name 01/01/25 1323                Is the patient's history suggestive of a new or worsening infection? Yes (Proceed)  -MR        Suspected source of infection pneumonia  -MR        Indicate SIRS criteria Tachycardia > 90 bpm;Tachypnea > 20 resp per min  -MR        Are two or more of the above signs & symptoms of infection both present and new to the patient? Yes (Proceed)  -MR        Assess for evidence of organ dysfunction: Are any of the below criteria present within 6 hours of suspected infection and SIRS criteria that are NOT considered to be chronic conditions? --                  User Key  (r) = Recorded By, (t) = Taken By, (c) = Cosigned By      Initials Name Provider Type    MR Shasta Carrasco MD Resident                       Shasta Carrasco MD  01/04/25 0941

## 2025-01-02 LAB
ANION GAP SERPL CALCULATED.3IONS-SCNC: 6 MMOL/L (ref 4–13)
B PARAP IS1001 DNA NPH QL NAA+NON-PROBE: NOT DETECTED
B PERT.PT PRMT NPH QL NAA+NON-PROBE: NOT DETECTED
BASOPHILS # BLD AUTO: 0.02 THOUSANDS/ΜL (ref 0–0.1)
BASOPHILS NFR BLD AUTO: 0 % (ref 0–1)
BUN SERPL-MCNC: 26 MG/DL (ref 5–25)
C PNEUM DNA NPH QL NAA+NON-PROBE: NOT DETECTED
CALCIUM SERPL-MCNC: 8.7 MG/DL (ref 8.4–10.2)
CHLORIDE SERPL-SCNC: 108 MMOL/L (ref 96–108)
CO2 SERPL-SCNC: 25 MMOL/L (ref 21–32)
CREAT SERPL-MCNC: 1.17 MG/DL (ref 0.6–1.3)
EOSINOPHIL # BLD AUTO: 0.12 THOUSAND/ΜL (ref 0–0.61)
EOSINOPHIL NFR BLD AUTO: 2 % (ref 0–6)
ERYTHROCYTE [DISTWIDTH] IN BLOOD BY AUTOMATED COUNT: 14.2 % (ref 11.6–15.1)
FLUAV RNA NPH QL NAA+NON-PROBE: NOT DETECTED
FLUBV RNA NPH QL NAA+NON-PROBE: NOT DETECTED
GFR SERPL CREATININE-BSD FRML MDRD: 53 ML/MIN/1.73SQ M
GLUCOSE SERPL-MCNC: 90 MG/DL (ref 65–140)
HADV DNA NPH QL NAA+NON-PROBE: NOT DETECTED
HCOV 229E RNA NPH QL NAA+NON-PROBE: NOT DETECTED
HCOV HKU1 RNA NPH QL NAA+NON-PROBE: NOT DETECTED
HCOV NL63 RNA NPH QL NAA+NON-PROBE: NOT DETECTED
HCOV OC43 RNA NPH QL NAA+NON-PROBE: DETECTED
HCT VFR BLD AUTO: 43.4 % (ref 36.5–49.3)
HGB BLD-MCNC: 13.8 G/DL (ref 12–17)
HMPV RNA NPH QL NAA+NON-PROBE: NOT DETECTED
HPIV1 RNA NPH QL NAA+NON-PROBE: NOT DETECTED
HPIV2 RNA NPH QL NAA+NON-PROBE: NOT DETECTED
HPIV3 RNA NPH QL NAA+NON-PROBE: NOT DETECTED
HPIV4 RNA NPH QL NAA+NON-PROBE: NOT DETECTED
IMM GRANULOCYTES # BLD AUTO: 0.04 THOUSAND/UL (ref 0–0.2)
IMM GRANULOCYTES NFR BLD AUTO: 1 % (ref 0–2)
LACTATE SERPL-SCNC: 1.1 MMOL/L (ref 0.5–2)
LYMPHOCYTES # BLD AUTO: 1.84 THOUSANDS/ΜL (ref 0.6–4.47)
LYMPHOCYTES NFR BLD AUTO: 32 % (ref 14–44)
M PNEUMO DNA NPH QL NAA+NON-PROBE: NOT DETECTED
MCH RBC QN AUTO: 32.7 PG (ref 26.8–34.3)
MCHC RBC AUTO-ENTMCNC: 31.8 G/DL (ref 31.4–37.4)
MCV RBC AUTO: 103 FL (ref 82–98)
MONOCYTES # BLD AUTO: 0.76 THOUSAND/ΜL (ref 0.17–1.22)
MONOCYTES NFR BLD AUTO: 13 % (ref 4–12)
NEUTROPHILS # BLD AUTO: 2.97 THOUSANDS/ΜL (ref 1.85–7.62)
NEUTS SEG NFR BLD AUTO: 52 % (ref 43–75)
NRBC BLD AUTO-RTO: 0 /100 WBCS
PLATELET # BLD AUTO: 93 THOUSANDS/UL (ref 149–390)
PLATELET BLD QL SMEAR: ABNORMAL
PMV BLD AUTO: 10.3 FL (ref 8.9–12.7)
POTASSIUM SERPL-SCNC: 4.6 MMOL/L (ref 3.5–5.3)
PROCALCITONIN SERPL-MCNC: 0.11 NG/ML
RBC # BLD AUTO: 4.22 MILLION/UL (ref 3.88–5.62)
RBC MORPH BLD: NORMAL
RSV RNA NPH QL NAA+NON-PROBE: NOT DETECTED
RV+EV RNA NPH QL NAA+NON-PROBE: NOT DETECTED
SARS-COV-2 RNA NPH QL NAA+NON-PROBE: NOT DETECTED
SODIUM SERPL-SCNC: 139 MMOL/L (ref 135–147)
WBC # BLD AUTO: 5.75 THOUSAND/UL (ref 4.31–10.16)

## 2025-01-02 PROCEDURE — 85025 COMPLETE CBC W/AUTO DIFF WBC: CPT

## 2025-01-02 PROCEDURE — 0202U NFCT DS 22 TRGT SARS-COV-2: CPT

## 2025-01-02 PROCEDURE — 83605 ASSAY OF LACTIC ACID: CPT | Performed by: INTERNAL MEDICINE

## 2025-01-02 PROCEDURE — 84145 PROCALCITONIN (PCT): CPT

## 2025-01-02 PROCEDURE — 99232 SBSQ HOSP IP/OBS MODERATE 35: CPT | Performed by: INTERNAL MEDICINE

## 2025-01-02 PROCEDURE — 36415 COLL VENOUS BLD VENIPUNCTURE: CPT

## 2025-01-02 PROCEDURE — 80048 BASIC METABOLIC PNL TOTAL CA: CPT

## 2025-01-02 RX ORDER — LEVALBUTEROL INHALATION SOLUTION 0.63 MG/3ML
0.63 SOLUTION RESPIRATORY (INHALATION)
Status: DISCONTINUED | OUTPATIENT
Start: 2025-01-02 | End: 2025-01-03

## 2025-01-02 RX ORDER — METOPROLOL TARTRATE 25 MG/1
25 TABLET, FILM COATED ORAL ONCE
Status: DISCONTINUED | OUTPATIENT
Start: 2025-01-02 | End: 2025-01-02

## 2025-01-02 RX ORDER — METOPROLOL TARTRATE 1 MG/ML
5 INJECTION, SOLUTION INTRAVENOUS EVERY 6 HOURS PRN
Status: DISCONTINUED | OUTPATIENT
Start: 2025-01-02 | End: 2025-01-06 | Stop reason: HOSPADM

## 2025-01-02 RX ADMIN — GABAPENTIN 100 MG: 100 CAPSULE ORAL at 21:30

## 2025-01-02 RX ADMIN — IPRATROPIUM BROMIDE 0.5 MG: 0.5 SOLUTION RESPIRATORY (INHALATION) at 10:45

## 2025-01-02 RX ADMIN — CHLORHEXIDINE GLUCONATE 15 ML: 1.2 RINSE ORAL at 21:30

## 2025-01-02 RX ADMIN — APIXABAN 2.5 MG: 2.5 TABLET, FILM COATED ORAL at 10:44

## 2025-01-02 RX ADMIN — Medication 3 MG: at 21:28

## 2025-01-02 RX ADMIN — LEVALBUTEROL HYDROCHLORIDE 0.63 MG: 0.63 SOLUTION RESPIRATORY (INHALATION) at 16:00

## 2025-01-02 RX ADMIN — METOPROLOL TARTRATE 25 MG: 25 TABLET, FILM COATED ORAL at 12:28

## 2025-01-02 RX ADMIN — IPRATROPIUM BROMIDE 0.5 MG: 0.5 SOLUTION RESPIRATORY (INHALATION) at 13:53

## 2025-01-02 RX ADMIN — LEVALBUTEROL HYDROCHLORIDE 0.63 MG: 0.63 SOLUTION RESPIRATORY (INHALATION) at 10:45

## 2025-01-02 RX ADMIN — IPRATROPIUM BROMIDE 0.5 MG: 0.5 SOLUTION RESPIRATORY (INHALATION) at 20:00

## 2025-01-02 RX ADMIN — METOPROLOL TARTRATE 25 MG: 25 TABLET, FILM COATED ORAL at 06:09

## 2025-01-02 RX ADMIN — METOPROLOL TARTRATE 25 MG: 25 TABLET, FILM COATED ORAL at 23:42

## 2025-01-02 RX ADMIN — APIXABAN 2.5 MG: 2.5 TABLET, FILM COATED ORAL at 21:28

## 2025-01-02 RX ADMIN — METOPROLOL TARTRATE 25 MG: 25 TABLET, FILM COATED ORAL at 00:32

## 2025-01-02 RX ADMIN — SENNOSIDES 17.2 MG: 8.6 TABLET, FILM COATED ORAL at 21:30

## 2025-01-02 RX ADMIN — METOPROLOL TARTRATE 5 MG: 1 INJECTION, SOLUTION INTRAVENOUS at 22:49

## 2025-01-02 NOTE — HOSPITAL COURSE
89-year-old male with past medical history of dementia and A-fib on Eliquis who presented from [facility] due to trouble breathing and desaturation of O2 in the 80s per facility.  He was also found to be in A-fib with RVR by EMS.  In ED, he was given 1 dose of Cardizem 10 Mg, nebulizer treatments, 1 dose of vancomycin and cefepime.  He had noted improvement of oxygen saturation in the 90s on room air.  COVID/flu negative.  RP 2 panel was positive for coronavirus OC43.  Pt started on prednisone pulse course with noted improving URI.  His Afib was controlled with QID lopressor.  Throughout hospital course, patients mentation waxed and waned with intermittent episodes of agitation, which was managed with daily Seroquel and as needed IM Zyprexa.  He was discharged with the following changes in his medications: Lopressor 25 Mg every 6 hours and Seroquel 25 Mg at bedtime.  Advised to follow-up with PCP and cardiologist for further evaluation of medication changes.  He was given 1 dose of prednisone prescription to complete a 5-day steroid course.     Patient is medically stable for discharge.

## 2025-01-02 NOTE — ASSESSMENT & PLAN NOTE
History atrial fibrillation, on Eliquis 2.5 mg twice daily currently-confirmed with facility  At home, patient takes Lopressor 37.5 mg twice daily.    Plan:  Continue with Lopressor 25 mg every 6 hours   Continue with Eliquis 2.5 twice daily for now  Consider increasing to Eliquis 5 mg twice daily as patient does not meet the requirements for reduction in dosing  Lopressor 5 mg as needed if HR greater than 150  Monitor on telemetry

## 2025-01-02 NOTE — ASSESSMENT & PLAN NOTE
01/02/25 Platelets 93K  Platelets 130k on adx, similar to prior values in the past.  Liver enzymes within normal range on adx.  No evidence of bleeding at this time.     Plan  Continue to monitor closely.

## 2025-01-02 NOTE — ASSESSMENT & PLAN NOTE
Presenting with tachycardia and tachypnea.  Afebrile.  No leukocytosis.  Lactic acid within normal range.  Likely in the setting of A-fib with RVR or viral infection although he did not test positive for COVID/flu  Continue to monitor off antibiotics  Patient received 1 L fluid bolus in the ER  On exam, appears euvolemic.      Plan:  Will hold off on additional fluids and reassess need

## 2025-01-02 NOTE — PROGRESS NOTES
Progress Note - Hospitalist   Name: Satish Novak 89 y.o. male I MRN: [id number]  Unit/Bed#: ED-02 I Date of Admission: 1/1/2025   Date of Service: 1/2/2025 I Hospital Day: 1    Assessment & Plan  Shortness of breath  Patient presented initially with shortness of breath/tachypnea at his facility and was found to have O2 sats in the 80's with audible wheezing.  In ED, received nebulizer treatments with noted improvement.  He is now on room air saturating in the upper 90s.  No increased sputum production with cough, no history of COPD.  He has a home rescue inhaler but only uses it every couple of months per facility.  Likely in the setting of A-fib with RVR or viral infection,as there are other sick residents with cough/rhinorrhea at the facility  Pro-Alfred and lactic acid within normal range.  Chest x-ray not showing any obvious infiltrates.  Patient is afebrile with no leukocytosis.  Received cefepime and vancomycin in the ER, at this time we will continue to monitor off antibiotics.  If patient develops a fever clinically worsens consider adding on antibiotics.   On exam, noted improvement in oxygen saturation on room air.  Patient still endorses conversational dyspnea.    Plan:  RP panel pending  Continue with [place]  Consider supplemental oxygen if patient desats  Monitor vital signs  Atrial fibrillation with RVR (HCC)  History atrial fibrillation, on Eliquis 2.5 mg twice daily currently-confirmed with facility  At home, patient takes Lopressor 37.5 mg twice daily.    Plan:  Continue with Lopressor 25 mg every 6 hours   Continue with Eliquis 2.5 twice daily for now  Consider increasing to Eliquis 5 mg twice daily as patient does not meet the requirements for reduction in dosing  Lopressor 5 mg as needed if HR greater than 150  Monitor on telemetry  SIRS (systemic inflammatory response syndrome) (HCC)  Presenting with tachycardia and tachypnea.  Afebrile.  No leukocytosis.  Lactic acid within normal range.   Likely in the setting of A-fib with RVR or viral infection although he did not test positive for COVID/flu  Continue to monitor off antibiotics  Patient received 1 L fluid bolus in the ER  On exam, appears euvolemic.      Plan:  Will hold off on additional fluids and reassess need  Dementia (HCC)  Spoke with Kathi faria facility staff member, at baseline patient understands his name but mostly uses word salad.  He does not understand the time, place, situation.  He is able to feed himself.  She reports that there were no acute mental changes noticed, he was at his baseline when EMS got there    Plan:  Delirium precautions frequent reorienting.  Minimize overnight interruptions  Tylenol 650 mg 3 times a day to ensure pain controlled   For severe agitation, consider Zyprexa 2.5 mg every 8 hours IM.  Per chart review, Seroquel 12.5 mg is listed as a nightly medication for patient however [place] facility member confirms that he does not take that at all  Monitor for fecal and urinary retention  PT/OT eval and treat  Chronic thrombocytopenia (HCC)  01/02/25 Platelets 93K  Platelets 130k on adx, similar to prior values in the past.  Liver enzymes within normal range on adx.  No evidence of bleeding at this time.     Plan  Continue to monitor closely.       VTE Pharmacologic Prophylaxis: VTE Score: 10 High Risk (Score >/= 5) - Pharmacological DVT Prophylaxis Ordered: apixaban (Eliquis). Sequential Compression Devices Ordered.    Mobility:      PT/OT eval and treat pending     Patient Centered Rounds: I performed bedside rounds with nursing staff today.   Discussions with Specialists or Other Care Team Provider: CM, PT/OT    Education and Discussions with Family / Patient: Updated  (guardian) via phone.    Current Length of Stay: 1 day(s)  Current Patient Status: Inpatient   Certification Statement: The patient will continue to require additional inpatient hospital stay due to Afib w/ RVR, RP panel  pending.  Discharge Plan: Anticipate discharge in 24-48 hrs to [facility].    Code Status: Level 3 - DNAR and DNI    Subjective   No overnight events.  Pt seen and examined this morning at bedside.  He was resting comfortably on room air, saturating at 95%.  He was AOx0, pleasantly confused.  Unable to answer my questions appropriately and follow directions.      Objective :  Temp:  [99.1 °F (37.3 °C)] 99.1 °F (37.3 °C)  HR:  [] 85  BP: (103-164)/(51-88) 103/58  Resp:  [22-28] 22  SpO2:  [94 %-98 %] 95 %  O2 Device: None (Room air)    Body mass index is 23 kg/m².     Input and Output Summary (last 24 hours):     Intake/Output Summary (Last 24 hours) at 1/2/2025 0502  Last data filed at 1/1/2025 1740  Gross per 24 hour   Intake --   Output 200 ml   Net -200 ml       Physical Exam  Vitals and nursing note reviewed.   Constitutional:       General: He is not in acute distress.     Appearance: He is not ill-appearing.      Comments: Pleasantly confused      Eyes:      General:         Right eye: No discharge.         Left eye: No discharge.      Extraocular Movements: Extraocular movements intact.      Conjunctiva/sclera: Conjunctivae normal.      Pupils: Pupils are equal, round, and reactive to light.   Cardiovascular:      Rate and Rhythm: Normal rate. Rhythm irregular.      Pulses: Normal pulses.   Pulmonary:      Effort: Pulmonary effort is normal.   Abdominal:      General: There is no distension.      Palpations: Abdomen is soft.      Tenderness: There is no abdominal tenderness. There is no guarding.   Musculoskeletal:      Right lower leg: No edema.      Left lower leg: No edema.   Skin:     General: Skin is warm and dry.      Capillary Refill: Capillary refill takes less than 2 seconds.      Coloration: Skin is not jaundiced.   Neurological:      Mental Status: He is alert. Mental status is at baseline. He is disoriented.         Lines/Drains:    Telemetry:  Telemetry Orders (From admission, onward)                24 Hour Telemetry Monitoring  Continuous x 24 Hours (Telem)        Question:  Reason for 24 Hour Telemetry  Answer:  Arrhythmias requiring acute medical intervention / PPM or ICD malfunction                     Telemetry Reviewed: Atrial fibrillation. HR averaging 80-90s  Indication for Continued Telemetry Use: Arrthymias requiring medical therapy               Lab Results: I have reviewed the following results:   Results from last 7 days   Lab Units 01/01/25  1134   WBC Thousand/uL 7.60   HEMOGLOBIN g/dL 15.4   HEMATOCRIT % 47.6   PLATELETS Thousands/uL 130*   SEGS PCT % 58   LYMPHO PCT % 30   MONO PCT % 10   EOS PCT % 1     Results from last 7 days   Lab Units 01/01/25  1134   SODIUM mmol/L 139   POTASSIUM mmol/L 4.9   CHLORIDE mmol/L 105   CO2 mmol/L 28   BUN mg/dL 29*   CREATININE mg/dL 1.36*   ANION GAP mmol/L 6   CALCIUM mg/dL 9.3   ALBUMIN g/dL 3.8   TOTAL BILIRUBIN mg/dL 0.82   ALK PHOS U/L 70   ALT U/L 17   AST U/L 32   GLUCOSE RANDOM mg/dL 104     Results from last 7 days   Lab Units 01/01/25  1134   INR  1.10             Results from last 7 days   Lab Units 01/01/25  1134   LACTIC ACID mmol/L 1.7   PROCALCITONIN ng/ml 0.09       Recent Cultures (last 7 days):   Results from last 7 days   Lab Units 01/01/25  1141 01/01/25  1134   BLOOD CULTURE  Received in Microbiology Lab. Culture in Progress. Received in Microbiology Lab. Culture in Progress.       Imaging Results Review: I reviewed radiology reports from this admission including: chest xray.  Other Study Results Review: EKG was reviewed.     Last 24 Hours Medication List:     Current Facility-Administered Medications:     acetaminophen (TYLENOL) tablet 650 mg, Q4H PRN    apixaban (ELIQUIS) tablet 2.5 mg, BID    chlorhexidine (PERIDEX) 0.12 % oral rinse 15 mL, Q12H PROSPER    gabapentin (NEURONTIN) capsule 100 mg, HS    ipratropium-albuterol (DUO-NEB) 0.5-2.5 mg/3 mL inhalation solution 3 mL, TID PRN    melatonin tablet 3 mg, HS    metoprolol  tartrate (LOPRESSOR) tablet 25 mg, Q6H    senna (SENOKOT) tablet 17.2 mg, HS    Administrative Statements   Today, Patient Was Seen By: Maribel Nichols DO    **Please Note: This note may have been constructed using a voice recognition system.**

## 2025-01-02 NOTE — UTILIZATION REVIEW
Initial Clinical Review    Admission: Date/Time/Statement:   Admission Orders (From admission, onward)       Ordered        01/01/25 1422  INPATIENT ADMISSION  Once                          Orders Placed This Encounter   Procedures    INPATIENT ADMISSION     Standing Status:   Standing     Number of Occurrences:   1     Level of Care:   Level 2 Stepdown / HOT [14]     Estimated length of stay:   More than 2 Midnights     Certification:   I certify that inpatient services are medically necessary for this patient for a duration of greater than two midnights. See H&P and MD Progress Notes for additional information about the patient's course of treatment.     ED Arrival Information       Expected   -    Arrival   1/1/2025 11:19    Acuity   Emergent              Means of arrival   Ambulance    Escorted by   [facility]    Service   Hospitalist    Admission type   Emergency              Arrival complaint   -             Chief Complaint   Patient presents with    Shortness of Breath     Pt from [facility]. Per EMS, pt O2 was 88% on RA. (+) Expiratory wheezing on arrival. Pt given one duoneb prehospital.        Initial Presentation: 89 y.o. male with a PMH of dementia and atrial fibrillation on eliquis who presents from [facility] after he appeared to have trouble breathing and his oxygen saturations were noted to be in the 80s per facility member. I called [facility] and spoke with one of the care members. She reported that Satish was in his normal state of health last night and this morning. She reported that he was in the main room and was noted to have increased work of breathing and shortness of breath and EMS was called after his oxygen levels were checked. She noticed some wheezing as well and a nonproductive cough.  At baseline with regards to his dementia, he understands his name but is not oriented to time, place, or situation. He frequently demonstrates word salad. She reports that he was  completely at his baseline mentation status this morning when EMS was called. He uses a walker and can feed himself. When EMS got there, patient was found sitting upright in a chair with a nonrebreather mask flow rate 10 L/min. He received nebulizer treatment with EMS. On arrival to the ER, his blood pressure was 126/72 and pulse was initially in the 80s. He received additional nebulizer therapies with an increase in heart rate in the 130s and was found to be in A-fib with RVR. Plan: Inpatient admission for evaluation and treatment of shortness of breath, Afib with RVR, SIRS, dementia, chronic thrombocytopenia: continue Eliquis, change Lopressor from 37.5 mg bid to 25 mg q 6 hrs, telemetry, delirium precautions, Tylenol tid, PT/OT eval.     Anticipated Length of Stay/Certification Statement: Patient will be admitted on an inpatient basis with an anticipated length of stay of greater than 2 midnights secondary to acute respiratory distress, a fib with RVR.     Date: 1/2   Day 2:     Internal medicine: RP panel pending, continue DuoNebs, continue Lopressor and Eliquis, telemetry, delirium precautions, Tylenol tid, PT/OT eval.     Date: 1/3  Day 3: Has surpassed a 2nd midnight with active treatments and services. Continue prednisone, DuoNebs, continue lopressor, switched to Eliquis 5 mg bid as pt does not meet criteria for lower dose, Telemetry, follow blood cultures, fall precautions, PT/OT eval.       ED Treatment-Medication Administration from 01/01/2025 1119 to 01/02/2025 1334         Date/Time Order Dose Route Action     01/01/2025 1127 ipratropium-albuterol (FOR EMS ONLY) (DUO-NEB) 0.5-2.5 mg/3 mL inhalation solution 3 mL 0 mL Does not apply Given to EMS     01/01/2025 1142 sodium chloride 0.9 % bolus 1,000 mL 1,000 mL Intravenous New Bag     01/01/2025 1144 cefepime (MAXIPIME) 2 g/50 mL dextrose IVPB 2,000 mg Intravenous New Bag     01/01/2025 1220 vancomycin (VANCOCIN) 1,750 mg in sodium chloride 0.9 % 500 mL  IVPB 1,750 mg Intravenous New Bag     01/01/2025 1146 albuterol inhalation solution 2.5 mg 2.5 mg Nebulization Given     01/01/2025 1146 ipratropium (ATROVENT) 0.02 % inhalation solution 0.5 mg 0.5 mg Nebulization Given     01/01/2025 1314 levalbuterol (XOPENEX) inhalation solution 0.63 mg 0.63 mg Nebulization Given     01/01/2025 1422 diltiazem (CARDIZEM) injection 10 mg 10 mg Intravenous Given     01/01/2025 2208 gabapentin (NEURONTIN) capsule 100 mg 100 mg Oral Given     01/01/2025 2208 melatonin tablet 3 mg 3 mg Oral Given     01/01/2025 2207 senna (SENOKOT) tablet 17.2 mg 17.2 mg Oral Given     01/01/2025 1740 apixaban (ELIQUIS) tablet 2.5 mg 2.5 mg Oral Given     01/02/2025 1044 apixaban (ELIQUIS) tablet 2.5 mg 2.5 mg Oral Given     01/01/2025 1740 metoprolol tartrate (LOPRESSOR) tablet 25 mg 25 mg Oral Given     01/02/2025 0032 metoprolol tartrate (LOPRESSOR) tablet 25 mg 25 mg Oral Given     01/02/2025 0609 metoprolol tartrate (LOPRESSOR) tablet 25 mg 25 mg Oral Given     01/02/2025 1228 metoprolol tartrate (LOPRESSOR) tablet 25 mg 25 mg Oral Given     01/01/2025 1703 acetaminophen (TYLENOL) tablet 650 mg 650 mg Oral Given     01/02/2025 1045 levalbuterol (XOPENEX) inhalation solution 0.63 mg 0.63 mg Nebulization Given     01/02/2025 1045 ipratropium (ATROVENT) 0.02 % inhalation solution 0.5 mg 0.5 mg Nebulization Given            Scheduled Medications:  apixaban, 2.5 mg, Oral, BID  chlorhexidine, 15 mL, Mouth/Throat, Q12H PROSPER  gabapentin, 100 mg, Oral, HS  ipratropium, 0.5 mg, Nebulization, TID  levalbuterol, 0.63 mg, Nebulization, Q8H  melatonin, 3 mg, Oral, HS  metoprolol tartrate, 25 mg, Oral, Q6H  senna, 17.2 mg, Oral, HS      Continuous IV Infusions:     PRN Meds:  acetaminophen, 650 mg, Oral, Q4H PRN  metoprolol, 5 mg, Intravenous, Q6H PRN      ED Triage Vitals   Temperature Pulse Respirations Blood Pressure SpO2 Pain Score   01/01/25 1126 01/01/25 1126 01/01/25 1126 01/01/25 1126 01/01/25 1126  01/02/25 0220   99.1 °F (37.3 °C) 85 (!) 28 126/72 96 % No Pain     Weight (last 2 days)       Date/Time Weight    01/01/25 1138 72.7 (160.27)            Vital Signs (last 3 days)       Date/Time Temp Pulse Resp BP MAP (mmHg) SpO2 O2 Device Patient Position - Orthostatic VS Pain    01/02/25 1200 -- 100 24 141/79 105 95 % None (Room air) Sitting --    01/02/25 0609 -- 96 -- 147/75 -- -- -- -- --    01/02/25 0220 -- -- -- -- -- -- -- -- No Pain    01/02/25 0200 -- 85 22 103/58 77 95 % -- Lying --    01/02/25 0045 -- 86 22 134/76 93 96 % None (Room air) -- --    01/02/25 0032 -- 105 -- 134/76 -- -- -- -- --    01/01/25 1830 -- 87 22 108/51 74 97 % None (Room air) -- --    01/01/25 1808 -- 99 22 121/59 -- 98 % None (Room air) -- --    01/01/25 1740 -- 110 -- 132/67 -- -- -- -- --    01/01/25 1700 -- 103 22 116/85 97 95 % None (Room air) -- --    01/01/25 1630 -- 93 22 130/56 81 94 % None (Room air) -- --    01/01/25 1600 -- 97 22 114/56 80 94 % None (Room air) -- --    01/01/25 1551 -- 112 22 114/56 -- 97 % None (Room air) -- --    01/01/25 1515 -- 98 -- -- -- 94 % None (Room air) -- --    01/01/25 1415 -- 119 -- 164/88 110 94 % None (Room air) Lying --    01/01/25 1315 -- 131 -- 117/76 89 97 % None (Room air) Lying --    01/01/25 1215 -- 107 28 132/73 99 96 % None (Room air) Sitting --    01/01/25 1134 -- -- -- -- -- -- None (Room air) -- --    01/01/25 1129 -- 114 -- -- -- -- -- -- --    01/01/25 1126 99.1 °F (37.3 °C) 85 28 126/72 -- 96 % None (Room air) -- --              Pertinent Labs/Diagnostic Test Results:   Radiology:  XR abdomen complete inc upright and/or decubitus   Final Interpretation by Tre Tompkins DO (01/02 1214)      Nonobstructive bowel gas pattern.      No evidence of pneumoperitoneum.               Resident: Francis Nolasco I, the attending radiologist, have reviewed the images and agree with the final report above.      Workstation performed: [id number]         XR chest 1 view portable    ED Interpretation by Noreen Tran MD (01/01 1314)   Questionable infiltrate to the R lower lung fields.       Final Interpretation by Giovani Cid MD (01/01 1726)   Curvilinear density over the right hemidiaphragm may represent atelectasis although motion artifact/free air is possible. A repeat obstruction series with left lateral decubitus view is advised.      This was discussed with Dr. Peralta at 5:15 p.m. via secure text            Workstation performed: [id number]         XR chest with decubitus left    (Results Pending)     Cardiology:  ECG 12 lead    by Interface, Ris Results In (01/01 1136)        GI:  No orders to display           Results from last 7 days   Lab Units 01/02/25  0620 01/01/25  1134   WBC Thousand/uL 5.75 7.60   HEMOGLOBIN g/dL 13.8 15.4   HEMATOCRIT % 43.4 47.6   PLATELETS Thousands/uL 93* 130*   TOTAL NEUT ABS Thousands/µL 2.97 4.46         Results from last 7 days   Lab Units 01/02/25  0620 01/01/25  1134   SODIUM mmol/L 139 139   POTASSIUM mmol/L 4.6 4.9   CHLORIDE mmol/L 108 105   CO2 mmol/L 25 28   ANION GAP mmol/L 6 6   BUN mg/dL 26* 29*   CREATININE mg/dL 1.17 1.36*   EGFR ml/min/1.73sq m 53 44   CALCIUM mg/dL 8.7 9.3     Results from last 7 days   Lab Units 01/01/25  1134   AST U/L 32   ALT U/L 17   ALK PHOS U/L 70   TOTAL PROTEIN g/dL 7.3   ALBUMIN g/dL 3.8   TOTAL BILIRUBIN mg/dL 0.82         Results from last 7 days   Lab Units 01/02/25  0620 01/01/25  1134   GLUCOSE RANDOM mg/dL 90 104         Results from last 7 days   Lab Units 01/01/25  1424 01/01/25  1134   HS TNI 0HR ng/L  --  7   HS TNI 2HR ng/L 7  --    HSTNI D2 ng/L 0  --          Results from last 7 days   Lab Units 01/01/25  1134   PROTIME seconds 14.9   INR  1.10   PTT seconds 32         Results from last 7 days   Lab Units 01/02/25  0620 01/01/25  1134   PROCALCITONIN ng/ml 0.11 0.09     Results from last 7 days   Lab Units 01/02/25  0620 01/01/25  1134   LACTIC ACID mmol/L 1.1 1.7           Results from last  7 days   Lab Units 01/01/25  1739   CLARITY UA  Clear   COLOR UA  Light Yellow   SPEC GRAV UA  1.019   PH UA  5.5   GLUCOSE UA mg/dl Negative   KETONES UA mg/dl Negative   BLOOD UA  Negative   PROTEIN UA mg/dl Trace*   NITRITE UA  Negative   BILIRUBIN UA  Negative   UROBILINOGEN UA (BE) mg/dl <2.0   LEUKOCYTES UA  Negative   WBC UA /hpf 1-2   RBC UA /hpf 1-2   BACTERIA UA /hpf None Seen   EPITHELIAL CELLS WET PREP /hpf Occasional           Results from last 7 days   Lab Units 01/01/25  1141 01/01/25  1134   BLOOD CULTURE  Received in Microbiology Lab. Culture in Progress. Received in Microbiology Lab. Culture in Progress.         Past Medical History:   Diagnosis Date    Anxiety     Atrial fibrillation (HCC)     Dementia (HCC)     Disorder of kidney and ureter     Dupuytren's disease     Fibromatosis     Hyperlipidemia     Hypertension     PAF (paroxysmal atrial fibrillation) (HCC)     Thrombocytopenia (HCC)     Tinea unguium     Vascular disease      Present on Admission:   Atrial fibrillation with RVR (HCC)   Dementia (HCC)   Chronic thrombocytopenia (HCC)      Admitting Diagnosis: Shortness of breath [R06.02]  Age/Sex: 89 y.o. male    Network Utilization Review Department  ATTENTION: Please call with any questions or concerns to [phone] and carefully listen to the prompts so that you are directed to the right person. All voicemails are confidential.   For Discharge needs, contact Care Management DC Support Team at [phone] opt. 2  Send all requests for admission clinical reviews, approved or denied determinations and any other requests to dedicated fax number below belonging to the campus where the patient is receiving treatment. List of dedicated fax numbers for the Facilities:  FACILITY NAME UR FAX NUMBER   ADMISSION DENIALS (Administrative/Medical Necessity) [phone]   DISCHARGE SUPPORT TEAM (NETWORK) [phone]   PARENT CHILD HEALTH (Maternity/NICU/Pediatrics) [phone]   Formerly [facility]  [facility] [phone]   [facility] [phone]   [facility], [facility] [phone]   General acute hospital [phone]   Former[facility] [phone]   [facility] [phone]   [facility] [phone]   [facility] [phone]   [facility] [phone]   [facility] [phone]   [facility] [phone]   [facility] [phone]

## 2025-01-02 NOTE — ASSESSMENT & PLAN NOTE
Patient presented initially with shortness of breath/tachypnea at his facility and was found to have O2 sats in the 80's with audible wheezing.  In ED, received nebulizer treatments with noted improvement.  He is now on room air saturating in the upper 90s.  No increased sputum production with cough, no history of COPD.  He has a home rescue inhaler but only uses it every couple of months per facility.  Likely in the setting of A-fib with RVR or viral infection,as there are other sick residents with cough/rhinorrhea at the facility  Pro-Alfred and lactic acid within normal range.  Chest x-ray not showing any obvious infiltrates.  Patient is afebrile with no leukocytosis.  Received cefepime and vancomycin in the ER, at this time we will continue to monitor off antibiotics.  If patient develops a fever clinically worsens consider adding on antibiotics.   On exam, noted improvement in oxygen saturation on room air.  Patient still endorses conversational dyspnea.    Plan:  RP panel pending  Continue with Lucia  Consider supplemental oxygen if patient desats  Monitor vital signs

## 2025-01-02 NOTE — ED NOTES
Patient had large liquid BM. Linens refreshed, patient washed, moisture barrier cream applied to reddened areas in groin and buttocks.      Roisbel Conner RN  01/02/25 5140

## 2025-01-02 NOTE — ASSESSMENT & PLAN NOTE
Spoke with [facility] staff member, at baseline patient understands his name but mostly uses word salad.  He does not understand the time, place, situation.  He is able to feed himself.  She reports that there were no acute mental changes noticed, he was at his baseline when EMS got there    Plan:  Delirium precautions frequent reorienting.  Minimize overnight interruptions  Tylenol 650 mg 3 times a day to ensure pain controlled   For severe agitation, consider Zyprexa 2.5 mg every 8 hours IM.  Per chart review, Seroquel 12.5 mg is listed as a nightly medication for patient however [facility] facility member confirms that he does not take that at all  Monitor for fecal and urinary retention  PT/OT eval and treat

## 2025-01-03 PROBLEM — A41.9 SEPSIS (HCC): Status: ACTIVE | Noted: 2025-01-01

## 2025-01-03 LAB
ANION GAP SERPL CALCULATED.3IONS-SCNC: 8 MMOL/L (ref 4–13)
BUN SERPL-MCNC: 21 MG/DL (ref 5–25)
CALCIUM SERPL-MCNC: 8.5 MG/DL (ref 8.4–10.2)
CHLORIDE SERPL-SCNC: 106 MMOL/L (ref 96–108)
CO2 SERPL-SCNC: 23 MMOL/L (ref 21–32)
CREAT SERPL-MCNC: 1.05 MG/DL (ref 0.6–1.3)
ERYTHROCYTE [DISTWIDTH] IN BLOOD BY AUTOMATED COUNT: 14 % (ref 11.6–15.1)
GFR SERPL CREATININE-BSD FRML MDRD: 60 ML/MIN/1.73SQ M
GLUCOSE SERPL-MCNC: 86 MG/DL (ref 65–140)
HCT VFR BLD AUTO: 41.9 % (ref 36.5–49.3)
HGB BLD-MCNC: 13.9 G/DL (ref 12–17)
MAGNESIUM SERPL-MCNC: 1.7 MG/DL (ref 1.9–2.7)
MCH RBC QN AUTO: 33.5 PG (ref 26.8–34.3)
MCHC RBC AUTO-ENTMCNC: 33.2 G/DL (ref 31.4–37.4)
MCV RBC AUTO: 101 FL (ref 82–98)
PLATELET # BLD AUTO: 101 THOUSANDS/UL (ref 149–390)
PMV BLD AUTO: 10.5 FL (ref 8.9–12.7)
POTASSIUM SERPL-SCNC: 4 MMOL/L (ref 3.5–5.3)
RBC # BLD AUTO: 4.15 MILLION/UL (ref 3.88–5.62)
SODIUM SERPL-SCNC: 137 MMOL/L (ref 135–147)
WBC # BLD AUTO: 5.76 THOUSAND/UL (ref 4.31–10.16)

## 2025-01-03 PROCEDURE — 94760 N-INVAS EAR/PLS OXIMETRY 1: CPT

## 2025-01-03 PROCEDURE — 83735 ASSAY OF MAGNESIUM: CPT

## 2025-01-03 PROCEDURE — 99232 SBSQ HOSP IP/OBS MODERATE 35: CPT | Performed by: INTERNAL MEDICINE

## 2025-01-03 PROCEDURE — 94640 AIRWAY INHALATION TREATMENT: CPT

## 2025-01-03 PROCEDURE — 80048 BASIC METABOLIC PNL TOTAL CA: CPT

## 2025-01-03 PROCEDURE — 85027 COMPLETE CBC AUTOMATED: CPT

## 2025-01-03 RX ORDER — QUETIAPINE FUMARATE 25 MG/1
6.25 TABLET, FILM COATED ORAL
Status: DISCONTINUED | OUTPATIENT
Start: 2025-01-03 | End: 2025-01-03

## 2025-01-03 RX ORDER — OLANZAPINE 2.5 MG/1
2.5 TABLET, FILM COATED ORAL
Status: DISCONTINUED | OUTPATIENT
Start: 2025-01-03 | End: 2025-01-03

## 2025-01-03 RX ORDER — PREDNISONE 20 MG/1
20 TABLET ORAL DAILY
Status: DISCONTINUED | OUTPATIENT
Start: 2025-01-03 | End: 2025-01-06 | Stop reason: HOSPADM

## 2025-01-03 RX ORDER — LEVALBUTEROL INHALATION SOLUTION 0.63 MG/3ML
0.63 SOLUTION RESPIRATORY (INHALATION)
Status: DISCONTINUED | OUTPATIENT
Start: 2025-01-03 | End: 2025-01-04

## 2025-01-03 RX ORDER — OLANZAPINE 10 MG/2ML
2.5 INJECTION, POWDER, FOR SOLUTION INTRAMUSCULAR ONCE
Status: COMPLETED | OUTPATIENT
Start: 2025-01-03 | End: 2025-01-03

## 2025-01-03 RX ORDER — OLANZAPINE 10 MG/2ML
2.5 INJECTION, POWDER, FOR SOLUTION INTRAMUSCULAR EVERY 6 HOURS PRN
Status: DISCONTINUED | OUTPATIENT
Start: 2025-01-03 | End: 2025-01-03

## 2025-01-03 RX ORDER — MAGNESIUM SULFATE HEPTAHYDRATE 40 MG/ML
2 INJECTION, SOLUTION INTRAVENOUS ONCE
Status: COMPLETED | OUTPATIENT
Start: 2025-01-03 | End: 2025-01-03

## 2025-01-03 RX ADMIN — METOPROLOL TARTRATE 25 MG: 25 TABLET, FILM COATED ORAL at 12:41

## 2025-01-03 RX ADMIN — METOPROLOL TARTRATE 25 MG: 25 TABLET, FILM COATED ORAL at 20:29

## 2025-01-03 RX ADMIN — APIXABAN 2.5 MG: 2.5 TABLET, FILM COATED ORAL at 09:09

## 2025-01-03 RX ADMIN — Medication 3 MG: at 20:24

## 2025-01-03 RX ADMIN — MAGNESIUM SULFATE HEPTAHYDRATE 2 G: 40 INJECTION, SOLUTION INTRAVENOUS at 06:55

## 2025-01-03 RX ADMIN — APIXABAN 5 MG: 5 TABLET, FILM COATED ORAL at 17:38

## 2025-01-03 RX ADMIN — LEVALBUTEROL HYDROCHLORIDE 0.63 MG: 0.63 SOLUTION RESPIRATORY (INHALATION) at 13:16

## 2025-01-03 RX ADMIN — IPRATROPIUM BROMIDE 0.5 MG: 0.5 SOLUTION RESPIRATORY (INHALATION) at 07:49

## 2025-01-03 RX ADMIN — METOPROLOL TARTRATE 25 MG: 25 TABLET, FILM COATED ORAL at 06:33

## 2025-01-03 RX ADMIN — CHLORHEXIDINE GLUCONATE 15 ML: 1.2 RINSE ORAL at 09:09

## 2025-01-03 RX ADMIN — OLANZAPINE 2.5 MG: 10 INJECTION, POWDER, FOR SOLUTION INTRAMUSCULAR at 10:32

## 2025-01-03 RX ADMIN — LEVALBUTEROL HYDROCHLORIDE 0.63 MG: 0.63 SOLUTION RESPIRATORY (INHALATION) at 07:49

## 2025-01-03 RX ADMIN — GABAPENTIN 100 MG: 100 CAPSULE ORAL at 20:24

## 2025-01-03 RX ADMIN — LEVALBUTEROL HYDROCHLORIDE 0.63 MG: 0.63 SOLUTION RESPIRATORY (INHALATION) at 01:05

## 2025-01-03 RX ADMIN — IPRATROPIUM BROMIDE 0.5 MG: 0.5 SOLUTION RESPIRATORY (INHALATION) at 13:16

## 2025-01-03 RX ADMIN — PREDNISONE 20 MG: 20 TABLET ORAL at 12:41

## 2025-01-03 NOTE — ASSESSMENT & PLAN NOTE
History atrial fibrillation, on Eliquis 2.5 mg twice daily currently-confirmed with facility  At home, patient takes Lopressor 37.5 mg twice daily.  Overnight, pt missed his lopressor dose.  He went into Afib with RVR.  One time dose of Lopressor 5mg was administered.  Pt's HR stabilized.        Plan:  Continue with Lopressor 25 mg every 6 hours   Switched to Eliquis 5mg BID as patient does not meet criteria for lower dose   Consider increasing to Eliquis 5 mg twice daily as patient does not meet the requirements for reduction in dosing  Lopressor 5 mg as needed if HR greater than 150  Monitor on telemetry

## 2025-01-03 NOTE — CASE MANAGEMENT
Case Management Discharge Planning Note    Patient name Satish Novak  Location S /S -01 MRN [id number]  : 1931 Date 1/3/2025       Current Admission Date: 2025  Current Admission Diagnosis:Shortness of breath   Patient Active Problem List    Diagnosis Date Noted Date Diagnosed    Shortness of breath 2025     SIRS (systemic inflammatory response syndrome) (HCC) 2025     Insomnia 2024     Hard of hearing 2024     Metabolic encephalopathy 2024     Atrial fibrillation with RVR ([facility]) 2024     Hospital discharge follow-up 2023     Acute gout of left foot 2023     Chronic thrombocytopenia (HCC) 2023     Sensory neural hearing loss 2023     Hyperlipidemia 2023     Prediabetes 2023     Fall 2023     Paroxysmal atrial fibrillation (HCC) 2023     Dementia ([facility]) 2023       LOS (days): 2  Geometric Mean LOS (GMLOS) (days): 2.3  Days to GMLOS:0.4     OBJECTIVE:  Risk of Unplanned Readmission Score: 14.77         Current admission status: Inpatient   Preferred Pharmacy:   [address]  [address] [place] [address]  Phone: [phone] Fax: [phone]    Primary Care Provider: Cecelia Jarrell MD    Primary Insurance: [employer]  Secondary Insurance: [facility]    DISCHARGE DETAILS:    Per [facility], Pt is expected to be medically ready for discharge tomorrow.  CM left  for Pts legal guardian, Teena requesting a call back.

## 2025-01-03 NOTE — PLAN OF CARE
Problem: Potential for Falls  Goal: Patient will remain free of falls  Description: INTERVENTIONS:  - Educate patient/family on patient safety including physical limitations  - Instruct patient to call for assistance with activity   - Consult OT/PT to assist with strengthening/mobility   - Keep Call bell within reach  - Keep bed low and locked with side rails adjusted as appropriate  - Keep care items and personal belongings within reach  - Initiate and maintain comfort rounds  - Make Fall Risk Sign visible to staff  - Offer Toileting every  Hours, in advance of need  - Initiate/Maintain alarm  - Obtain necessary fall risk management equipment:   - Apply yellow socks and bracelet for high fall risk patients  - Consider moving patient to room near nurses station  1/3/2025 1146 by Jackelin Levy RN  Outcome: Progressing  1/3/2025 1146 by Jackelin Levy RN  Outcome: Progressing     Problem: PAIN - ADULT  Goal: Verbalizes/displays adequate comfort level or baseline comfort level  Description: Interventions:  - Encourage patient to monitor pain and request assistance  - Assess pain using appropriate pain scale  - Administer analgesics based on type and severity of pain and evaluate response  - Implement non-pharmacological measures as appropriate and evaluate response  - Consider cultural and social influences on pain and pain management  - Notify physician/advanced practitioner if interventions unsuccessful or patient reports new pain  1/3/2025 1146 by Jackelin Levy RN  Outcome: Progressing  1/3/2025 1146 by Jackelin Levy RN  Outcome: Progressing     Problem: INFECTION - ADULT  Goal: Absence or prevention of progression during hospitalization  Description: INTERVENTIONS:  - Assess and monitor for signs and symptoms of infection  - Monitor lab/diagnostic results  - Monitor all insertion sites, i.e. indwelling lines, tubes, and drains  - Monitor endotracheal if appropriate and nasal secretions for changes in  amount and color  - [place] appropriate cooling/warming therapies per order  - Administer medications as ordered  - Instruct and encourage patient and family to use good hand hygiene technique  - Identify and instruct in appropriate isolation precautions for identified infection/condition  1/3/2025 1146 by Jackelin Levy RN  Outcome: Progressing  1/3/2025 1146 by Jackelin Levy RN  Outcome: Progressing  Goal: Absence of fever/infection during neutropenic period  Description: INTERVENTIONS:  - Monitor WBC    1/3/2025 1146 by Jackelin Levy RN  Outcome: Progressing  1/3/2025 1146 by Jackelin Levy RN  Outcome: Progressing     Problem: SAFETY ADULT  Goal: Patient will remain free of falls  Description: INTERVENTIONS:  - Educate patient/family on patient safety including physical limitations  - Instruct patient to call for assistance with activity   - Consult OT/PT to assist with strengthening/mobility   - Keep Call bell within reach  - Keep bed low and locked with side rails adjusted as appropriate  - Keep care items and personal belongings within reach  - Initiate and maintain comfort rounds  - Make Fall Risk Sign visible to staff  - Offer Toileting every  Hours, in advance of need  - Initiate/Maintain alarm  - Obtain necessary fall risk management equipment:   - Apply yellow socks and bracelet for high fall risk patients  - Consider moving patient to room near nurses station  1/3/2025 1146 by Jackelin Levy RN  Outcome: Progressing  1/3/2025 1146 by Jackelin Levy RN  Outcome: Progressing  Goal: Maintain or return to baseline ADL function  Description: INTERVENTIONS:  -  Assess patient's ability to carry out ADLs; assess patient's baseline for ADL function and identify physical deficits which impact ability to perform ADLs (bathing, care of mouth/teeth, toileting, grooming, dressing, etc.)  - Assess/evaluate cause of self-care deficits   - Assess range of motion  - Assess patient's mobility; develop  plan if impaired  - Assess patient's need for assistive devices and provide as appropriate  - Encourage maximum independence but intervene and supervise when necessary  - Involve family in performance of ADLs  - Assess for home care needs following discharge   - Consider OT consult to assist with ADL evaluation and planning for discharge  - Provide patient education as appropriate  1/3/2025 1146 by Jackelin Levy RN  Outcome: Progressing  1/3/2025 1146 by Jackelin Levy RN  Outcome: Progressing  1   Problem: DISCHARGE PLANNING  Goal: Discharge to home or other facility with appropriate resources  Description: INTERVENTIONS:  - Identify barriers to discharge w/patient and caregiver  - Arrange for needed discharge resources and transportation as appropriate  - Identify discharge learning needs (meds, wound care, etc.)  - Arrange for interpretive services to assist at discharge as needed  - Refer to Case Management Department for coordinating discharge planning if the patient needs post-hospital services based on physician/advanced practitioner order or complex needs related to functional status, cognitive ability, or social support system  1/3/2025 1146 by Jackelin Levy RN  Outcome: Progressing  1/3/2025 1146 by Jackelin Levy RN  Outcome: Progressing     Problem: Knowledge Deficit  Goal: Patient/family/caregiver demonstrates understanding of disease process, treatment plan, medications, and discharge instructions  Description: Complete learning assessment and assess knowledge base.  Interventions:  - Provide teaching at level of understanding  - Provide teaching via preferred learning methods  1/3/2025 1146 by Jackelin Levy RN  Outcome: Progressing  1/3/2025 1146 by Jackelin Levy RN  Outcome: Progressing     Problem: Prexisting or High Potential for Compromised Skin Integrity  Goal: Skin integrity is maintained or improved  Description: INTERVENTIONS:  - Identify patients at risk for skin breakdown  -  Assess and monitor skin integrity  - Assess and monitor nutrition and hydration status  - Monitor labs   - Assess for incontinence   - Turn and reposition patient  - Assist with mobility/ambulation  - Relieve pressure over bony prominences  - Avoid friction and shearing  - Provide appropriate hygiene as needed including keeping skin clean and dry  - Evaluate need for skin moisturizer/barrier cream  - Collaborate with interdisciplinary team   - Patient/family teaching  - Consider wound care consult   1/3/2025 1146 by Jackelin Levy RN  Outcome: Progressing  1/3/2025 1146 by Jackelin Levy RN  Outcome: Progressing

## 2025-01-03 NOTE — PROGRESS NOTES
Progress Note - Hospitalist   Name: Satish Novak 89 y.o. male I MRN: [id number]  Unit/Bed#: S -01 I Date of Admission: 1/1/2025   Date of Service: 1/3/2025 I Hospital Day: 2    Assessment & Plan  Shortness of breath  Patient presented initially with shortness of breath/tachypnea at his facility and was found to have O2 sats in the 80's with audible wheezing.  In ED, received nebulizer treatments with noted improvement.  He is now on room air saturating in the upper 90s.  No increased sputum production with cough, no history of COPD.  He has a home rescue inhaler but only uses it every couple of months per facility.  Patient is afebrile with no leukocytosis. Pro-Alfred and lactic acid within normal range.    Chest x-ray not showing any obvious infiltrates.    Received cefepime and vancomycin in the ER   RP2 panel positive for coronovirus OC43  On exam, right sided expiratory wheezing appreciated     Plan:  Prednisone 20mg daily x 5 days  Continue with DuoNebs  Consider supplemental oxygen if patient desats  Monitor vital signs  Atrial fibrillation with RVR ([facility])  History atrial fibrillation, on Eliquis 2.5 mg twice daily currently-confirmed with facility  At home, patient takes Lopressor 37.5 mg twice daily.  Overnight, pt missed his lopressor dose.  He went into Afib with RVR.  One time dose of Lopressor 5mg was administered.  Pt's HR stabilized.        Plan:  Continue with Lopressor 25 mg every 6 hours   Switched to Eliquis 5mg BID as patient does not meet criteria for lower dose   Consider increasing to Eliquis 5 mg twice daily as patient does not meet the requirements for reduction in dosing  Lopressor 5 mg as needed if HR greater than 150  Monitor on telemetry  Sepsis (HCC)  Presenting with tachycardia and tachypnea.  Afebrile.  No leukocytosis.  Lactic acid within normal range.    Patient received 1 L fluid bolus, 1 dose rocephin ER  On exam, appears euvolemic.    RP panel positive for coronovirus  OC43    Plan:  See primary problem   Continue to monitor off antibiotics  Bcx2 no growth at 24 hours, final pending   Will hold off on additional fluids and reassess need  Dementia (HCC)  Per [facility], at baseline patient understands his name but mostly uses word salad.  He does not understand the time, place, situation.  He is able to feed himself.  She reports that there were no acute mental changes noticed, he was at his baseline when EMS got there    Plan:  Delirium precautions frequent reorienting.  Minimize overnight interruptions  Fall precautions   For severe agitation, Zyprexa 2.5 mg every 6 hours IM.    Tylenol 650 mg q4 prn for pain control  Monitor for fecal and urinary retention  PT/OT eval and treat  Chronic thrombocytopenia (HCC)  01/03/25 Platelets 101K  Platelets 130k on adx, similar to prior values in the past.  Liver enzymes within normal range on adx.  No evidence of bleeding at this time.     Plan  Continue to monitor closely.       VTE Pharmacologic Prophylaxis: VTE Score: 10 High Risk (Score >/= 5) - Pharmacological DVT Prophylaxis Contraindicated. Sequential Compression Devices Ordered. Pt on Eliquis for Afib     Mobility:   Basic Mobility Inpatient Raw Score: 6  JH-HLM Goal: 2: Bed activities/Dependent transfer  JH-HLM Achieved: 2: Bed activities/Dependent transfer  JH-HLM Goal achieved. Continue to encourage appropriate mobility.    Patient Centered Rounds: I performed bedside rounds with nursing staff today.   Discussions with Specialists or Other Care Team Provider: -    Education and Discussions with Family / Patient: Attempted to update  (guardian) via phone. Unable to contact.    Current Length of Stay: 2 day(s)  Current Patient Status: Inpatient   Certification Statement: The patient will continue to require additional inpatient hospital stay due to sob w/ noted wheezing  Discharge Plan: Anticipate discharge in 24-48 hrs to prior assisted or independent living  facility.    Code Status: Level 3 - DNAR and DNI    Subjective     Pt seen and examined this morning at bedside.  He is AOx 0, unable to answer my questions.  No concerns and/or complaints at this time.        Objective :  Temp:  [98.3 °F (36.8 °C)-98.9 °F (37.2 °C)] 98.4 °F (36.9 °C)  HR:  [] 89  BP: (116-157)/(70-87) 143/71  Resp:  [20-24] 20  SpO2:  [92 %-99 %] 94 %  O2 Device: None (Room air)    Body mass index is 21.67 kg/m².     Input and Output Summary (last 24 hours):     Intake/Output Summary (Last 24 hours) at 1/3/2025 0920  Last data filed at 1/3/2025 0856  Gross per 24 hour   Intake 710 ml   Output 883 ml   Net -173 ml       Physical Exam  Vitals and nursing note reviewed.   Constitutional:       General: He is not in acute distress.     Appearance: He is not ill-appearing.      Comments: Pleasantly confused      Eyes:      General:         Right eye: No discharge.         Left eye: No discharge.      Extraocular Movements: Extraocular movements intact.      Conjunctiva/sclera: Conjunctivae normal.      Pupils: Pupils are equal, round, and reactive to light.   Cardiovascular:      Rate and Rhythm: Normal rate. Rhythm irregular.      Pulses: Normal pulses.   Pulmonary:      Effort: Pulmonary effort is normal.      Breath sounds: Wheezing present.   Abdominal:      General: There is no distension.      Palpations: Abdomen is soft.      Tenderness: There is no abdominal tenderness. There is no guarding.   Musculoskeletal:      Right lower leg: No edema.      Left lower leg: No edema.   Skin:     General: Skin is warm and dry.      Capillary Refill: Capillary refill takes less than 2 seconds.      Coloration: Skin is not jaundiced.   Neurological:      Mental Status: He is alert. Mental status is at baseline. He is disoriented.         Lines/Drains:    Telemetry:  Telemetry Orders (From admission, onward)               24 Hour Telemetry Monitoring  Continuous x 24 Hours (Telem)        Expiring    Question:  Reason for 24 Hour Telemetry  Answer:  Arrhythmias requiring acute medical intervention / PPM or ICD malfunction                     Telemetry Reviewed: Atrial fibrillation. HR averaging 70s  Indication for Continued Telemetry Use: Arrthymias requiring medical therapy             Lab Results: I have reviewed the following results:   Results from last 7 days   Lab Units 01/03/25  0447 01/02/25  0620   WBC Thousand/uL 5.76 5.75   HEMOGLOBIN g/dL 13.9 13.8   HEMATOCRIT % 41.9 43.4   PLATELETS Thousands/uL 101* 93*   SEGS PCT %  --  52   LYMPHO PCT %  --  32   MONO PCT %  --  13*   EOS PCT %  --  2     Results from last 7 days   Lab Units 01/03/25  0447 01/02/25  0620 01/01/25  1134   SODIUM mmol/L 137   < > 139   POTASSIUM mmol/L 4.0   < > 4.9   CHLORIDE mmol/L 106   < > 105   CO2 mmol/L 23   < > 28   BUN mg/dL 21   < > 29*   CREATININE mg/dL 1.05   < > 1.36*   ANION GAP mmol/L 8   < > 6   CALCIUM mg/dL 8.5   < > 9.3   ALBUMIN g/dL  --   --  3.8   TOTAL BILIRUBIN mg/dL  --   --  0.82   ALK PHOS U/L  --   --  70   ALT U/L  --   --  17   AST U/L  --   --  32   GLUCOSE RANDOM mg/dL 86   < > 104    < > = values in this interval not displayed.     Results from last 7 days   Lab Units 01/01/25  1134   INR  1.10             Results from last 7 days   Lab Units 01/02/25  0620 01/01/25  1134   LACTIC ACID mmol/L 1.1 1.7   PROCALCITONIN ng/ml 0.11 0.09       Recent Cultures (last 7 days):   Results from last 7 days   Lab Units 01/01/25  1141 01/01/25  1134   BLOOD CULTURE  No Growth at 24 hrs. No Growth at 24 hrs.       Imaging Results Review: I reviewed radiology reports from this admission including: chest xray.  Other Study Results Review: EKG was reviewed.     Last 24 Hours Medication List:     Current Facility-Administered Medications:     acetaminophen (TYLENOL) tablet 650 mg, Q4H PRN    apixaban (ELIQUIS) tablet 2.5 mg, BID    chlorhexidine (PERIDEX) 0.12 % oral rinse 15 mL, Q12H PROSPER    gabapentin (NEURONTIN)  capsule 100 mg, HS    ipratropium (ATROVENT) 0.02 % inhalation solution 0.5 mg, TID    levalbuterol (XOPENEX) inhalation solution 0.63 mg, Q8H    melatonin tablet 3 mg, HS    metoprolol (LOPRESSOR) injection 5 mg, Q6H PRN    metoprolol tartrate (LOPRESSOR) tablet 25 mg, Q6H    senna (SENOKOT) tablet 17.2 mg, HS    Administrative Statements   Today, Patient Was Seen By: Maribel Nichols DO    **Please Note: This note may have been constructed using a voice recognition system.**

## 2025-01-03 NOTE — ASSESSMENT & PLAN NOTE
Per [facility] facility, at baseline patient understands his name but mostly uses word salad.  He does not understand the time, place, situation.  He is able to feed himself.  She reports that there were no acute mental changes noticed, he was at his baseline when EMS got there    Plan:  Delirium precautions frequent reorienting.  Minimize overnight interruptions  Fall precautions   For severe agitation, Zyprexa 2.5 mg every 6 hours IM.    Tylenol 650 mg q4 prn for pain control  Monitor for fecal and urinary retention  PT/OT eval and treat

## 2025-01-03 NOTE — PROGRESS NOTES
Patient:    MRN:  [id number]    [facility] Request ID:  [id number]    Level of care reserved:  Skilled Nursing Facility    [facility] [place] [id number] [phone]    Clinical needs requested:    Geography searched:  10 miles around [id number]    Start of Service:    Request sent:  9:42am EST on 1/3/2025 by Rosibel Sorto    Partner reserved:  12:44pm EST on 1/3/2025 by Rosibel Sorto    Choice list shared:  12:44pm EST on 1/3/2025 by Rosibel Sorto

## 2025-01-03 NOTE — ASSESSMENT & PLAN NOTE
01/03/25 Platelets 101K  Platelets 130k on adx, similar to prior values in the past.  Liver enzymes within normal range on adx.  No evidence of bleeding at this time.     Plan  Continue to monitor closely.

## 2025-01-03 NOTE — ED NOTES
SBAR sent to assigned Nurse, patient is off to the Unit, AAOx2 resp even and unlabored with no S$S of distress.      Grayson Khanna RN  01/02/25 7806

## 2025-01-03 NOTE — CASE MANAGEMENT
Case Management Discharge Planning Note    Patient name Satish Novak  Location S /S -01 MRN [id number]  : 1931 Date 1/3/2025       Current Admission Date: 2025  Current Admission Diagnosis:Shortness of breath   Patient Active Problem List    Diagnosis Date Noted Date Diagnosed    Shortness of breath 2025     Sepsis (HCC) 2025     Insomnia 2024     Hard of hearing 2024     Metabolic encephalopathy 2024     Atrial fibrillation with RVR (HCC) 2024     Hospital discharge follow-up 2023     Acute gout of left foot 2023     Chronic thrombocytopenia (HCC) 2023     Sensory neural hearing loss 2023     Hyperlipidemia 2023     Prediabetes 2023     Fall 2023     Paroxysmal atrial fibrillation (HCC) 2023     Dementia (HCC) 2023       LOS (days): 2  Geometric Mean LOS (GMLOS) (days): 2.3  Days to GMLOS:0.2     OBJECTIVE:  Risk of Unplanned Readmission Score: 14.77         Current admission status: Inpatient   Preferred Pharmacy:   [address]  [address] [place] [address]  Phone: [phone] Fax: [phone]    Primary Care Provider: Cecelia Jarrell MD    Primary Insurance: [employer] REP  Secondary Insurance: [facility] FOR YOU    DISCHARGE DETAILS:      Accepting Facility Name, City & State : [facility]  Receiving Facility/Agency Phone Number: [phone]  Facility/Agency Fax Number: [phone].       Per [facility], Pt is expected to be medically ready for discharge tomorrow, .  Pts legal guardian, Teena is aware and in agreement with Pts d/c to [facility].     Pt is leaving via stretcher van with [employer] at 1:00pm.     RN and SLIM provider aware.

## 2025-01-03 NOTE — ASSESSMENT & PLAN NOTE
Patient presented initially with shortness of breath/tachypnea at his facility and was found to have O2 sats in the 80's with audible wheezing.  In ED, received nebulizer treatments with noted improvement.  He is now on room air saturating in the upper 90s.  No increased sputum production with cough, no history of COPD.  He has a home rescue inhaler but only uses it every couple of months per facility.  Patient is afebrile with no leukocytosis. Pro-Alfred and lactic acid within normal range.    Chest x-ray not showing any obvious infiltrates.    Received cefepime and vancomycin in the ER   RP2 panel positive for coronovirus OC43  On exam, right sided expiratory wheezing appreciated     Plan:  Prednisone 20mg daily x 5 days  Continue with DuoNebs  Consider supplemental oxygen if patient desats  Monitor vital signs

## 2025-01-03 NOTE — CASE MANAGEMENT
Case Management Assessment & Discharge Planning Note    Patient name Satish Novak  Location S /S -01 MRN [id number]  : 1931 Date 1/3/2025       Current Admission Date: 2025  Current Admission Diagnosis:Shortness of breath   Patient Active Problem List    Diagnosis Date Noted Date Diagnosed    Shortness of breath 2025     Sepsis (HCC) 2025     Insomnia 2024     Hard of hearing 2024     Metabolic encephalopathy 2024     Atrial fibrillation with RVR (HCC) 2024     Hospital discharge follow-up 2023     Acute gout of left foot 2023     Chronic thrombocytopenia (HCC) 2023     Sensory neural hearing loss 2023     Hyperlipidemia 2023     Prediabetes 2023     Fall 2023     Paroxysmal atrial fibrillation (HCC) 2023     Dementia (HCC) 2023       LOS (days): 2  Geometric Mean LOS (GMLOS) (days): 2.3  Days to GMLOS:0.3     OBJECTIVE:    Risk of Unplanned Readmission Score: 14.77         Current admission status: Inpatient       Preferred Pharmacy:   [place] [address]  Phone: [phone] Fax: [phone]    Primary Care Provider: Cecelia Jarrell MD    Primary Insurance: [employer]  Secondary Insurance: [facility] FOR YOU    ASSESSMENT:  Active Health Care Proxies    There are no active Health Care Proxies on file.                 Readmission Root Cause  30 Day Readmission: No    Patient Information  Admitted from:: Facility (LTC resident at [facility])  Mental Status: Confused  During Assessment patient was accompanied by: Other-Comment (Legal guardian Teena)  Assessment information provided by:: Other - please comment (Guardian Teena)  Primary Caregiver: Other (Comment)  Caregiver's Name:: Staff at [facility]  Caregiver's Relationship to Patient:: Facility Staff  Support Systems: Self, Other (Comment)  [facility]  Residence: [place]  What city do you live in?: [place]  Home entry access options. Select all that apply.: Elevator  Type of Current Residence: Facility  Living Arrangements: Lives in Facility    Activities of Daily Living Prior to Admission  Functional Status: Assistance  Completes ADLs independently?: No  Level of ADL dependence: Assistance  Does patient use assisted devices?: No         Patient Information Continued  Income Source: Pension/custodial  Does patient have prescription coverage?: Yes  Does patient receive dialysis treatments?: No  Does patient have a history of substance abuse?: No  Does patient have a history of Mental Health Diagnosis?: No        DISCHARGE DETAILS:    Discharge planning discussed with:: Patient's legal guardianTeena  Freedom of Choice: Yes     CM contacted family/caregiver?: Yes             Contacts  Patient Contacts: Guardian - Teena  Relationship to Patient:: Family  Contact Method: Phone  Phone Number: [phone]  Reason/Outcome: Discharge Planning, Continuity of Care      Other Referral/Resources/Interventions Provided:  Referral Comments: NESHA referral for [facility] submitted in Aidin         Treatment Team Recommendation: Facility Return  Discharge Destination Plan:: Facility Return    IMM Given (Date):: 01/03/25  IMM Given to:: Designee  Family notified:: Legal guardian Teena       CM spoke with Pts legal guardianTeena (PH: [phone]). Pt is a LTC resident at [facility]. Per Teena, Pt requires assistance w/ all adls. Pts DCP is to return to [facility].

## 2025-01-03 NOTE — ASSESSMENT & PLAN NOTE
Presenting with tachycardia and tachypnea.  Afebrile.  No leukocytosis.  Lactic acid within normal range.    Patient received 1 L fluid bolus, 1 dose rocephin ER  On exam, appears euvolemic.    RP panel positive for coronovirus OC43    Plan:  See primary problem   Continue to monitor off antibiotics  Bcx2 no growth at 24 hours, final pending   Will hold off on additional fluids and reassess need

## 2025-01-04 LAB
ANION GAP SERPL CALCULATED.3IONS-SCNC: 6 MMOL/L (ref 4–13)
BUN SERPL-MCNC: 29 MG/DL (ref 5–25)
CALCIUM SERPL-MCNC: 9.2 MG/DL (ref 8.4–10.2)
CHLORIDE SERPL-SCNC: 109 MMOL/L (ref 96–108)
CO2 SERPL-SCNC: 23 MMOL/L (ref 21–32)
CREAT SERPL-MCNC: 1.35 MG/DL (ref 0.6–1.3)
GFR SERPL CREATININE-BSD FRML MDRD: 44 ML/MIN/1.73SQ M
GLUCOSE SERPL-MCNC: 119 MG/DL (ref 65–140)
MAGNESIUM SERPL-MCNC: 2.5 MG/DL (ref 1.9–2.7)
POTASSIUM SERPL-SCNC: 4.8 MMOL/L (ref 3.5–5.3)
POTASSIUM SERPL-SCNC: 5.8 MMOL/L (ref 3.5–5.3)
POTASSIUM SERPL-SCNC: 6.1 MMOL/L (ref 3.5–5.3)
SODIUM SERPL-SCNC: 138 MMOL/L (ref 135–147)

## 2025-01-04 PROCEDURE — 94640 AIRWAY INHALATION TREATMENT: CPT

## 2025-01-04 PROCEDURE — 84132 ASSAY OF SERUM POTASSIUM: CPT

## 2025-01-04 PROCEDURE — 94760 N-INVAS EAR/PLS OXIMETRY 1: CPT

## 2025-01-04 PROCEDURE — 80048 BASIC METABOLIC PNL TOTAL CA: CPT | Performed by: INTERNAL MEDICINE

## 2025-01-04 PROCEDURE — 83735 ASSAY OF MAGNESIUM: CPT | Performed by: INTERNAL MEDICINE

## 2025-01-04 PROCEDURE — 84132 ASSAY OF SERUM POTASSIUM: CPT | Performed by: INTERNAL MEDICINE

## 2025-01-04 PROCEDURE — 99232 SBSQ HOSP IP/OBS MODERATE 35: CPT | Performed by: INTERNAL MEDICINE

## 2025-01-04 RX ORDER — WATER 10 ML/10ML
INJECTION INTRAMUSCULAR; INTRAVENOUS; SUBCUTANEOUS
Status: COMPLETED
Start: 2025-01-04 | End: 2025-01-04

## 2025-01-04 RX ORDER — OLANZAPINE 10 MG/2ML
5 INJECTION, POWDER, FOR SOLUTION INTRAMUSCULAR EVERY 4 HOURS PRN
Status: DISCONTINUED | OUTPATIENT
Start: 2025-01-04 | End: 2025-01-06 | Stop reason: HOSPADM

## 2025-01-04 RX ORDER — METOPROLOL TARTRATE 1 MG/ML
5 INJECTION, SOLUTION INTRAVENOUS EVERY 6 HOURS
Status: DISCONTINUED | OUTPATIENT
Start: 2025-01-04 | End: 2025-01-04

## 2025-01-04 RX ORDER — SODIUM POLYSTYRENE SULFONATE 4.1 MEQ/G
15 POWDER, FOR SUSPENSION ORAL; RECTAL ONCE
Status: COMPLETED | OUTPATIENT
Start: 2025-01-04 | End: 2025-01-04

## 2025-01-04 RX ORDER — ALBUTEROL SULFATE 0.83 MG/ML
2.5 SOLUTION RESPIRATORY (INHALATION) EVERY 6 HOURS PRN
Status: DISCONTINUED | OUTPATIENT
Start: 2025-01-04 | End: 2025-01-06 | Stop reason: HOSPADM

## 2025-01-04 RX ORDER — SODIUM CHLORIDE, SODIUM GLUCONATE, SODIUM ACETATE, POTASSIUM CHLORIDE, MAGNESIUM CHLORIDE, SODIUM PHOSPHATE, DIBASIC, AND POTASSIUM PHOSPHATE .53; .5; .37; .037; .03; .012; .00082 G/100ML; G/100ML; G/100ML; G/100ML; G/100ML; G/100ML; G/100ML
100 INJECTION, SOLUTION INTRAVENOUS CONTINUOUS
Status: DISCONTINUED | OUTPATIENT
Start: 2025-01-04 | End: 2025-01-04

## 2025-01-04 RX ORDER — OLANZAPINE 10 MG/2ML
5 INJECTION, POWDER, FOR SOLUTION INTRAMUSCULAR ONCE
Status: COMPLETED | OUTPATIENT
Start: 2025-01-04 | End: 2025-01-04

## 2025-01-04 RX ORDER — SODIUM CHLORIDE 9 MG/ML
100 INJECTION, SOLUTION INTRAVENOUS CONTINUOUS
Status: DISCONTINUED | OUTPATIENT
Start: 2025-01-04 | End: 2025-01-06

## 2025-01-04 RX ORDER — METOPROLOL TARTRATE 1 MG/ML
10 INJECTION, SOLUTION INTRAVENOUS EVERY 6 HOURS
Status: DISCONTINUED | OUTPATIENT
Start: 2025-01-04 | End: 2025-01-04

## 2025-01-04 RX ORDER — OLANZAPINE 10 MG/2ML
5 INJECTION, POWDER, FOR SOLUTION INTRAMUSCULAR EVERY 4 HOURS PRN
Status: DISCONTINUED | OUTPATIENT
Start: 2025-01-04 | End: 2025-01-04

## 2025-01-04 RX ADMIN — APIXABAN 5 MG: 5 TABLET, FILM COATED ORAL at 18:13

## 2025-01-04 RX ADMIN — METOPROLOL TARTRATE 25 MG: 25 TABLET, FILM COATED ORAL at 18:13

## 2025-01-04 RX ADMIN — SODIUM CHLORIDE 100 ML/HR: 0.9 INJECTION, SOLUTION INTRAVENOUS at 23:33

## 2025-01-04 RX ADMIN — CHLORHEXIDINE GLUCONATE 15 ML: 1.2 RINSE ORAL at 09:06

## 2025-01-04 RX ADMIN — WATER 10 ML: 1 INJECTION, SOLUTION INTRAMUSCULAR; INTRAVENOUS; SUBCUTANEOUS at 18:12

## 2025-01-04 RX ADMIN — SODIUM CHLORIDE 100 ML/HR: 0.9 INJECTION, SOLUTION INTRAVENOUS at 11:33

## 2025-01-04 RX ADMIN — METOROPROLOL TARTRATE 5 MG: 5 INJECTION, SOLUTION INTRAVENOUS at 02:44

## 2025-01-04 RX ADMIN — SENNOSIDES 17.2 MG: 8.6 TABLET, FILM COATED ORAL at 22:02

## 2025-01-04 RX ADMIN — IPRATROPIUM BROMIDE 0.5 MG: 0.5 SOLUTION RESPIRATORY (INHALATION) at 08:09

## 2025-01-04 RX ADMIN — METOPROLOL TARTRATE 25 MG: 25 TABLET, FILM COATED ORAL at 23:41

## 2025-01-04 RX ADMIN — APIXABAN 5 MG: 5 TABLET, FILM COATED ORAL at 09:05

## 2025-01-04 RX ADMIN — OLANZAPINE 5 MG: 10 INJECTION, POWDER, FOR SOLUTION INTRAMUSCULAR at 18:12

## 2025-01-04 RX ADMIN — GABAPENTIN 100 MG: 100 CAPSULE ORAL at 22:02

## 2025-01-04 RX ADMIN — Medication 3 MG: at 22:02

## 2025-01-04 RX ADMIN — METOROPROLOL TARTRATE 10 MG: 5 INJECTION, SOLUTION INTRAVENOUS at 09:05

## 2025-01-04 RX ADMIN — LEVALBUTEROL HYDROCHLORIDE 0.63 MG: 0.63 SOLUTION RESPIRATORY (INHALATION) at 08:09

## 2025-01-04 RX ADMIN — SODIUM POLYSTYRENE SULFONATE 15 G: 4.1 POWDER, FOR SUSPENSION ORAL; RECTAL at 11:19

## 2025-01-04 RX ADMIN — CHLORHEXIDINE GLUCONATE 15 ML: 1.2 RINSE ORAL at 22:01

## 2025-01-04 RX ADMIN — PREDNISONE 20 MG: 20 TABLET ORAL at 09:05

## 2025-01-04 NOTE — ASSESSMENT & PLAN NOTE
Recent Labs     01/02/25  0620 01/03/25  0447 01/04/25  0316   CREATININE 1.17 1.05 1.35*   EGFR 53 60 44     Estimated Creatinine Clearance: 33.1 mL/min (A) (by C-G formula based on SCr of 1.35 mg/dL (H)).    Baseline Cr ~1.0-1.1  Euvolemic on exam   Poor PO intake     Plan:  Started on continuous NS 100c/hr fluids   Reassess tomorrow   Trend daily BMP

## 2025-01-04 NOTE — ASSESSMENT & PLAN NOTE
Patient presented initially with shortness of breath/tachypnea at his facility and was found to have O2 sats in the 80's with audible wheezing.  In ED, received nebulizer treatments with noted improvement.  He is now on room air saturating in the upper 90s.  No increased sputum production with cough, no history of COPD.  He has a home rescue inhaler but only uses it every couple of months per facility.  Patient is afebrile with no leukocytosis. Pro-Alfred and lactic acid within normal range.    Chest x-ray not showing any obvious infiltrates.    Received cefepime and vancomycin in the ER   RP2 panel positive for coronovirus OC43  On exam, improved breath sounds, no wheezing appreciated.  Plan:  Prednisone 20mg daily x 5 days  Day 2   Continue with DuoNedonna  Consider supplemental oxygen if patient desats  Monitor vital signs

## 2025-01-04 NOTE — CASE MANAGEMENT
Case Management Discharge Planning Note    Patient name Satish Novak  Location S /S -01 MRN [id number]  : 1931 Date 2025       Current Admission Date: 2025  Current Admission Diagnosis:Shortness of breath   Patient Active Problem List    Diagnosis Date Noted Date Diagnosed    Shortness of breath 2025     Sepsis (HCC) 2025     Insomnia 2024     Hard of hearing 2024     Metabolic encephalopathy 2024     Atrial fibrillation with RVR (HCC) 2024     Hospital discharge follow-up 2023     Acute gout of left foot 2023     Chronic thrombocytopenia (HCC) 2023     Sensory neural hearing loss 2023     Hyperlipidemia 2023     Prediabetes 2023     STEPHON (acute kidney injury) ([facility]) 2023     Fall 2023     Paroxysmal atrial fibrillation ([facility]) 2023     Dementia ([facility]) 2023       LOS (days): 3  Geometric Mean LOS (GMLOS) (days): 3.5  Days to GMLOS:0.4     OBJECTIVE:  Risk of Unplanned Readmission Score: 17.9         Current admission status: Inpatient   Preferred Pharmacy:   [place] [address]  Phone: [phone] Fax: [phone]    Primary Care Provider: Cecelia Jarrell MD    Primary Insurance: [employer] REP  Secondary Insurance: [facility] FOR YOU    DISCHARGE DETAILS:     DEEPTI notified by MD that patient is not medically cleared due to development of STEPHON. CM canceled the 1300 transport and notified [place] via [facility]IN.

## 2025-01-04 NOTE — QUICK NOTE
Patient refused oral medication lopressor 25 mg scheduled q6hour   Patient was sinus tachy on tele -130  Placed lopressor 10 mg IV q6hour scheduled for the time being which is equivalent to the 25 mg metoprolol tartrate mg

## 2025-01-04 NOTE — ASSESSMENT & PLAN NOTE
Per [facility] facility, at baseline patient understands his name but mostly uses word salad.  He does not understand the time, place, situation.  He is able to feed himself.  She reports that there were no acute mental changes noticed, he was at his baseline when EMS got there    Plan:  Delirium precautions frequent reorienting.  Minimize overnight interruptions  Fall precautions   For severe agitation, consider Zyprexa 2.5 mg every 6 hours IM as needed     Tylenol 650 mg q4 prn for pain control  Monitor for fecal and urinary retention  PT/OT eval and treat

## 2025-01-04 NOTE — ASSESSMENT & PLAN NOTE
History atrial fibrillation, on Eliquis 2.5 mg twice daily currently-confirmed with facility  At home, patient takes Lopressor 37.5 mg twice daily.  Overnight, pt missed his lopressor dose.  He went into Afib with RVR.  One time dose of Lopressor 5mg was administered.  Pt's HR stabilized.        Plan:  Continue with Lopressor 25 mg every 6 hours   Switched to Eliquis 5mg BID as patient does not meet criteria for lower dose   Lopressor 5 mg as needed if HR greater than 150

## 2025-01-04 NOTE — PROGRESS NOTES
Progress Note - Hospitalist   Name: Satish Novak 89 y.o. male I MRN: [id number]  Unit/Bed#: S -01 I Date of Admission: 1/1/2025   Date of Service: 1/4/2025 I Hospital Day: 3    Assessment & Plan  Shortness of breath  Patient presented initially with shortness of breath/tachypnea at his facility and was found to have O2 sats in the 80's with audible wheezing.  In ED, received nebulizer treatments with noted improvement.  He is now on room air saturating in the upper 90s.  No increased sputum production with cough, no history of COPD.  He has a home rescue inhaler but only uses it every couple of months per facility.  Patient is afebrile with no leukocytosis. Pro-Alfred and lactic acid within normal range.    Chest x-ray not showing any obvious infiltrates.    Received cefepime and vancomycin in the ER   RP2 panel positive for coronovirus OC43  On exam, improved breath sounds, no wheezing appreciated.  Plan:  Prednisone 20mg daily x 5 days  Day 2   Continue with DuoNebs  Consider supplemental oxygen if patient desats  Monitor vital signs  Atrial fibrillation with RVR (HCC)  History atrial fibrillation, on Eliquis 2.5 mg twice daily currently-confirmed with facility  At home, patient takes Lopressor 37.5 mg twice daily.  Overnight, pt missed his lopressor dose.  He went into Afib with RVR.  One time dose of Lopressor 5mg was administered.  Pt's HR stabilized.        Plan:  Continue with Lopressor 25 mg every 6 hours   Switched to Eliquis 5mg BID as patient does not meet criteria for lower dose   Lopressor 5 mg as needed if HR greater than 150    Sepsis (HCC)  Presenting with tachycardia and tachypnea.  Afebrile.  No leukocytosis.  Lactic acid within normal range.    Patient received 1 L fluid bolus, 1 dose rocephin ER  On exam, appears euvolemic.    RP panel positive for coronovirus OC43    Plan:  See primary problem   Continue to monitor off antibiotics  Bcx2 no growth at 48 hours, final pending   Dementia  ([facility])  Per [facility], at baseline patient understands his name but mostly uses word salad.  He does not understand the time, place, situation.  He is able to feed himself.  She reports that there were no acute mental changes noticed, he was at his baseline when EMS got there    Plan:  Delirium precautions frequent reorienting.  Minimize overnight interruptions  Fall precautions   For severe agitation, consider Zyprexa 2.5 mg every 6 hours IM as needed     Tylenol 650 mg q4 prn for pain control  Monitor for fecal and urinary retention  PT/OT eval and treat  Chronic thrombocytopenia ([facility])  01/03/25 Platelets 101K  Platelets 130k on adx, similar to prior values in the past.  Liver enzymes within normal range on adx.  No evidence of bleeding at this time.     Plan  Continue to monitor closely.     STEHPON (acute kidney injury) ([facility])  Recent Labs     01/02/25  0620 01/03/25  0447 01/04/25  0316   CREATININE 1.17 1.05 1.35*   EGFR 53 60 44     Estimated Creatinine Clearance: 33.1 mL/min (A) (by C-G formula based on SCr of 1.35 mg/dL (H)).    Baseline Cr ~1.0-1.1  Euvolemic on exam   Poor PO intake     Plan:  Started on continuous NS 100c/hr fluids   Reassess tomorrow   Trend daily BMP      VTE Pharmacologic Prophylaxis: VTE Score: 10 High Risk (Score >/= 5) - Pharmacological DVT Prophylaxis Contraindicated. Sequential Compression Devices Ordered.  On therapeutic Eliquis.     Mobility:   Basic Mobility Inpatient Raw Score: 6  JH-HLM Goal: 2: Bed activities/Dependent transfer  JH-HLM Achieved: 2: Bed activities/Dependent transfer  JH-HLM Goal achieved. Continue to encourage appropriate mobility.    Patient Centered Rounds: I performed bedside rounds with nursing staff today.   Discussions with Specialists or Other Care Team Provider: RN    Education and Discussions with Family / Patient: Attempted to update  (guardian) via phone. Unable to contact.    Current Length of Stay: 3 day(s)  Current Patient  Status: Inpatient   Certification Statement: The patient will continue to require additional inpatient hospital stay due to development of STEPHON and agitation requiring restraints and pharmacological medication.  Discharge Plan: Anticipate discharge in 24-48 hrs to prior assisted or independent living facility.    Code Status: Level 3 - DNAR and DNI    Subjective   Patient seen and examined this morning at bedside.  He is AO x 0, pleasantly confused and calm today.  He denied any complaints and appears comfortable.    Objective :  Temp:  [98.4 °F (36.9 °C)] 98.4 °F (36.9 °C)  HR:  [] 67  BP: ()/(58-85) 122/81  SpO2:  [93 %-97 %] 97 %  O2 Device: None (Room air)    Body mass index is 21.67 kg/m².     Input and Output Summary (last 24 hours):     Intake/Output Summary (Last 24 hours) at 1/4/2025 0436  Last data filed at 1/3/2025 2028  Gross per 24 hour   Intake 230 ml   Output 374 ml   Net -144 ml       Physical Exam  Vitals and nursing note reviewed.   Constitutional:       General: He is not in acute distress.     Appearance: He is not ill-appearing.      Comments: Pleasantly confused      HENT:      Head: Normocephalic and atraumatic.      Mouth/Throat:      Mouth: Mucous membranes are moist.   Eyes:      General:         Right eye: No discharge.         Left eye: No discharge.      Extraocular Movements: Extraocular movements intact.      Conjunctiva/sclera: Conjunctivae normal.      Pupils: Pupils are equal, round, and reactive to light.   Cardiovascular:      Rate and Rhythm: Normal rate. Rhythm irregular.      Pulses: Normal pulses.   Pulmonary:      Effort: Pulmonary effort is normal. No respiratory distress.      Breath sounds: No wheezing.      Comments: 97% O2 saturation on room air  Abdominal:      General: There is no distension.      Palpations: Abdomen is soft.      Tenderness: There is no abdominal tenderness. There is no guarding.   Musculoskeletal:      Right lower leg: No edema.      Left  lower leg: No edema.   Skin:     General: Skin is warm and dry.      Capillary Refill: Capillary refill takes less than 2 seconds.      Coloration: Skin is not jaundiced.   Neurological:      Mental Status: He is alert. Mental status is at baseline. He is disoriented.       Lines/Drains:        Lab Results: I have reviewed the following results:   Results from last 7 days   Lab Units 01/03/25  0447 01/02/25  0620   WBC Thousand/uL 5.76 5.75   HEMOGLOBIN g/dL 13.9 13.8   HEMATOCRIT % 41.9 43.4   PLATELETS Thousands/uL 101* 93*   SEGS PCT %  --  52   LYMPHO PCT %  --  32   MONO PCT %  --  13*   EOS PCT %  --  2     Results from last 7 days   Lab Units 01/04/25  0316 01/02/25  0620 01/01/25  1134   SODIUM mmol/L 138   < > 139   POTASSIUM mmol/L 5.8*   < > 4.9   CHLORIDE mmol/L 109*   < > 105   CO2 mmol/L 23   < > 28   BUN mg/dL 29*   < > 29*   CREATININE mg/dL 1.35*   < > 1.36*   ANION GAP mmol/L 6   < > 6   CALCIUM mg/dL 9.2   < > 9.3   ALBUMIN g/dL  --   --  3.8   TOTAL BILIRUBIN mg/dL  --   --  0.82   ALK PHOS U/L  --   --  70   ALT U/L  --   --  17   AST U/L  --   --  32   GLUCOSE RANDOM mg/dL 119   < > 104    < > = values in this interval not displayed.     Results from last 7 days   Lab Units 01/01/25  1134   INR  1.10             Results from last 7 days   Lab Units 01/02/25  0620 01/01/25  1134   LACTIC ACID mmol/L 1.1 1.7   PROCALCITONIN ng/ml 0.11 0.09       Recent Cultures (last 7 days):   Results from last 7 days   Lab Units 01/01/25  1141 01/01/25  1134   BLOOD CULTURE  No Growth at 48 hrs. No Growth at 48 hrs.       Imaging Results Review: I reviewed radiology reports from this admission including: chest xray and xray(s).  Other Study Results Review: EKG was reviewed.     Last 24 Hours Medication List:     Current Facility-Administered Medications:     acetaminophen (TYLENOL) tablet 650 mg, Q4H PRN    apixaban (ELIQUIS) tablet 5 mg, BID    chlorhexidine (PERIDEX) 0.12 % oral rinse 15 mL, Q12H PROSPER     gabapentin (NEURONTIN) capsule 100 mg, HS    ipratropium (ATROVENT) 0.02 % inhalation solution 0.5 mg, TID    levalbuterol (XOPENEX) inhalation solution 0.63 mg, TID    melatonin tablet 3 mg, HS    metoprolol (LOPRESSOR) injection 10 mg, Q6H    metoprolol (LOPRESSOR) injection 5 mg, Q6H PRN    [Held by provider] metoprolol tartrate (LOPRESSOR) tablet 25 mg, Q6H    predniSONE tablet 20 mg, Daily    senna (SENOKOT) tablet 17.2 mg, HS    Administrative Statements   Today, Patient Was Seen By: Maribel Nichols DO    **Please Note: This note may have been constructed using a voice recognition system.**

## 2025-01-04 NOTE — ASSESSMENT & PLAN NOTE
Presenting with tachycardia and tachypnea.  Afebrile.  No leukocytosis.  Lactic acid within normal range.    Patient received 1 L fluid bolus, 1 dose rocephin ER  On exam, appears euvolemic.    RP panel positive for coronovirus OC43    Plan:  See primary problem   Continue to monitor off antibiotics  Bcx2 no growth at 48 hours, final pending

## 2025-01-05 LAB
ANION GAP SERPL CALCULATED.3IONS-SCNC: 5 MMOL/L (ref 4–13)
BUN SERPL-MCNC: 30 MG/DL (ref 5–25)
CALCIUM SERPL-MCNC: 9.1 MG/DL (ref 8.4–10.2)
CHLORIDE SERPL-SCNC: 112 MMOL/L (ref 96–108)
CO2 SERPL-SCNC: 24 MMOL/L (ref 21–32)
CREAT SERPL-MCNC: 1.08 MG/DL (ref 0.6–1.3)
ERYTHROCYTE [DISTWIDTH] IN BLOOD BY AUTOMATED COUNT: 13.9 % (ref 11.6–15.1)
GFR SERPL CREATININE-BSD FRML MDRD: 58 ML/MIN/1.73SQ M
GLUCOSE SERPL-MCNC: 80 MG/DL (ref 65–140)
HCT VFR BLD AUTO: 46.8 % (ref 36.5–49.3)
HGB BLD-MCNC: 15.1 G/DL (ref 12–17)
MAGNESIUM SERPL-MCNC: 2.1 MG/DL (ref 1.9–2.7)
MCH RBC QN AUTO: 34.3 PG (ref 26.8–34.3)
MCHC RBC AUTO-ENTMCNC: 32.3 G/DL (ref 31.4–37.4)
MCV RBC AUTO: 106 FL (ref 82–98)
PLATELET # BLD AUTO: 114 THOUSANDS/UL (ref 149–390)
PMV BLD AUTO: 10.7 FL (ref 8.9–12.7)
POTASSIUM SERPL-SCNC: 4.4 MMOL/L (ref 3.5–5.3)
RBC # BLD AUTO: 4.4 MILLION/UL (ref 3.88–5.62)
SODIUM SERPL-SCNC: 141 MMOL/L (ref 135–147)
WBC # BLD AUTO: 6.21 THOUSAND/UL (ref 4.31–10.16)

## 2025-01-05 PROCEDURE — 97116 GAIT TRAINING THERAPY: CPT

## 2025-01-05 PROCEDURE — 97163 PT EVAL HIGH COMPLEX 45 MIN: CPT

## 2025-01-05 PROCEDURE — 83735 ASSAY OF MAGNESIUM: CPT

## 2025-01-05 PROCEDURE — 99232 SBSQ HOSP IP/OBS MODERATE 35: CPT | Performed by: INTERNAL MEDICINE

## 2025-01-05 PROCEDURE — 85027 COMPLETE CBC AUTOMATED: CPT

## 2025-01-05 PROCEDURE — 80048 BASIC METABOLIC PNL TOTAL CA: CPT

## 2025-01-05 RX ORDER — QUETIAPINE FUMARATE 25 MG/1
25 TABLET, FILM COATED ORAL
Status: DISCONTINUED | OUTPATIENT
Start: 2025-01-05 | End: 2025-01-06 | Stop reason: HOSPADM

## 2025-01-05 RX ADMIN — CHLORHEXIDINE GLUCONATE 15 ML: 1.2 RINSE ORAL at 09:01

## 2025-01-05 RX ADMIN — Medication 3 MG: at 21:34

## 2025-01-05 RX ADMIN — APIXABAN 5 MG: 5 TABLET, FILM COATED ORAL at 18:18

## 2025-01-05 RX ADMIN — QUETIAPINE FUMARATE 25 MG: 25 TABLET ORAL at 13:44

## 2025-01-05 RX ADMIN — METOPROLOL TARTRATE 25 MG: 25 TABLET, FILM COATED ORAL at 13:44

## 2025-01-05 RX ADMIN — METOPROLOL TARTRATE 25 MG: 25 TABLET, FILM COATED ORAL at 09:00

## 2025-01-05 RX ADMIN — PREDNISONE 20 MG: 20 TABLET ORAL at 09:00

## 2025-01-05 RX ADMIN — GABAPENTIN 100 MG: 100 CAPSULE ORAL at 21:34

## 2025-01-05 RX ADMIN — APIXABAN 5 MG: 5 TABLET, FILM COATED ORAL at 09:00

## 2025-01-05 RX ADMIN — SENNOSIDES 17.2 MG: 8.6 TABLET, FILM COATED ORAL at 21:34

## 2025-01-05 NOTE — ASSESSMENT & PLAN NOTE
Recent Labs     01/03/25  0447 01/04/25  0316 01/05/25  0859   CREATININE 1.05 1.35* 1.08   EGFR 60 44 58     Estimated Creatinine Clearance: 41.4 mL/min (by C-G formula based on SCr of 1.08 mg/dL).    Baseline Cr ~1.0-1.1  Euvolemic on exam   Poor PO intake     Plan:  Started on continuous NS 100c/hr fluids   Reassess tomorrow   Trend daily BMP

## 2025-01-05 NOTE — ASSESSMENT & PLAN NOTE
Presenting with tachycardia and tachypnea.  Afebrile.  No leukocytosis.  Lactic acid within normal range.    Patient received 1 L fluid bolus, 1 dose rocephin ER  On exam, appears euvolemic.    RP panel positive for coronovirus OC43    Plan:  See primary problem   Continue to monitor off antibiotics  Bcx2 no growth at 72 hours, final pending

## 2025-01-05 NOTE — ASSESSMENT & PLAN NOTE
Per [facility] facility, at baseline patient understands his name but mostly uses word salad.  He does not understand the time, place, situation.  He is able to feed himself.  She reports that there were no acute mental changes noticed, he was at his baseline when EMS got there    Plan:  Delirium precautions frequent reorienting.  Minimize overnight interruptions  Fall precautions   Started on Seroquel 25 mg at lunch time to prevent sundowning/agitation  Refrain from further chemical or physical restraints if possible  Tylenol 650 mg q4 prn for pain control  Monitor for fecal and urinary retention  PT/OT eval and treat

## 2025-01-05 NOTE — ASSESSMENT & PLAN NOTE
Patient presented initially with shortness of breath/tachypnea at his facility and was found to have O2 sats in the 80's with audible wheezing.  In ED, received nebulizer treatments with noted improvement.  He is now on room air saturating in the upper 90s.  No increased sputum production with cough, no history of COPD.  He has a home rescue inhaler but only uses it every couple of months per facility.  Patient is afebrile with no leukocytosis. Pro-Alfred and lactic acid within normal range.    Chest x-ray not showing any obvious infiltrates.    Received cefepime and vancomycin in the ER   RP2 panel positive for coronovirus OC43  On exam, improved breath sounds, no wheezing appreciated.  Plan:  Prednisone 20mg daily x 5 days  Day 3  Continue with DuoNedonna  Consider supplemental oxygen if patient desats  Monitor vital signs

## 2025-01-05 NOTE — PROGRESS NOTES
Progress Note - Hospitalist   Name: Satish Novak 89 y.o. male I MRN: [id number]  Unit/Bed#: S -01 I Date of Admission: 1/1/2025   Date of Service: 1/5/2025 I Hospital Day: 4    Assessment & Plan  Shortness of breath  Patient presented initially with shortness of breath/tachypnea at his facility and was found to have O2 sats in the 80's with audible wheezing.  In ED, received nebulizer treatments with noted improvement.  He is now on room air saturating in the upper 90s.  No increased sputum production with cough, no history of COPD.  He has a home rescue inhaler but only uses it every couple of months per facility.  Patient is afebrile with no leukocytosis. Pro-Alfred and lactic acid within normal range.    Chest x-ray not showing any obvious infiltrates.    Received cefepime and vancomycin in the ER   RP2 panel positive for coronovirus OC43  On exam, improved breath sounds, no wheezing appreciated.  Plan:  Prednisone 20mg daily x 5 days  Day 3  Continue with DuoNebs  Consider supplemental oxygen if patient desats  Monitor vital signs  Atrial fibrillation with RVR (HCC)  History atrial fibrillation, on Eliquis 2.5 mg twice daily currently-confirmed with facility  At home, patient takes Lopressor 37.5 mg twice daily.  Overnight, pt missed his lopressor dose.  He went into Afib with RVR.  One time dose of Lopressor 5mg was administered.  Pt's HR stabilized.        Plan:  Continue with Lopressor 25 mg every 6 hours   Switched to Eliquis 5mg BID as patient does not meet criteria for lower dose   Lopressor 5 mg as needed if HR greater than 150    Sepsis (HCC)  Presenting with tachycardia and tachypnea.  Afebrile.  No leukocytosis.  Lactic acid within normal range.    Patient received 1 L fluid bolus, 1 dose rocephin ER  On exam, appears euvolemic.    RP panel positive for coronovirus OC43    Plan:  See primary problem   Continue to monitor off antibiotics  Bcx2 no growth at 72 hours, final pending   Dementia  ([facility])  Per [facility], at baseline patient understands his name but mostly uses word salad.  He does not understand the time, place, situation.  He is able to feed himself.  She reports that there were no acute mental changes noticed, he was at his baseline when EMS got there    Plan:  Delirium precautions frequent reorienting.  Minimize overnight interruptions  Fall precautions   Started on Seroquel 25 mg at lunch time to prevent sundowning/agitation  Refrain from further chemical or physical restraints if possible  Tylenol 650 mg q4 prn for pain control  Monitor for fecal and urinary retention  PT/OT eval and treat  Chronic thrombocytopenia ([facility])  01/03/25 Platelets 101K  Platelets 130k on adx, similar to prior values in the past.  Liver enzymes within normal range on adx.  No evidence of bleeding at this time.     Plan  Continue to monitor closely.     STEPHON (acute kidney injury) ([facility])  Recent Labs     01/03/25  0447 01/04/25  0316 01/05/25  0859   CREATININE 1.05 1.35* 1.08   EGFR 60 44 58     Estimated Creatinine Clearance: 41.4 mL/min (by C-G formula based on SCr of 1.08 mg/dL).    Baseline Cr ~1.0-1.1  Euvolemic on exam   Poor PO intake     Plan:  Started on continuous NS 100c/hr fluids   Reassess tomorrow   Trend daily BMP      VTE Pharmacologic Prophylaxis: VTE Score: 10 High Risk (Score >/= 5) - Pharmacological DVT Prophylaxis Ordered: apixaban (Eliquis). Sequential Compression Devices Ordered.    Mobility:   Basic Mobility Inpatient Raw Score: 11  JH-HLM Goal: 4: Move to chair/commode  JH-HLM Achieved: 6: Walk 10 steps or more  JH-HLM Goal achieved. Continue to encourage appropriate mobility.    Patient Centered Rounds: I performed bedside rounds with nursing staff today.   Discussions with Specialists or Other Care Team Provider: Dr. Federica PINEDA    Education and Discussions with Family / Patient: Attempted to update  (guardian) via phone. Unable to contact.    Current Length of Stay:  4 day(s)  Current Patient Status: Inpatient   Certification Statement: The patient will continue to require additional inpatient hospital stay due to agitation requiring IM zyprexa overnight  Discharge Plan: Anticipate discharge in 24-48 hrs to prior assisted or independent living facility.    Code Status: Level 3 - DNAR and DNI    Subjective   Overnight, patient became increasingly agitated requiring IM Zyprexa.  Patient seen and evaluated this morning, pleasantly confused, has no complaints.    Objective :  Temp:  [97.2 °F (36.2 °C)-98.2 °F (36.8 °C)] 97.2 °F (36.2 °C)  HR:  [60-95] 60  BP: (104-134)/(67-86) 104/67  Resp:  [13-16] 13  SpO2:  [97 %-100 %] 99 %    Body mass index is 21.67 kg/m².     Input and Output Summary (last 24 hours):     Intake/Output Summary (Last 24 hours) at 1/5/2025 1217  Last data filed at 1/4/2025 2131  Gross per 24 hour   Intake 120 ml   Output 40 ml   Net 80 ml       Physical Exam  Vitals reviewed.   Constitutional:       Appearance: Normal appearance.   HENT:      Head: Normocephalic and atraumatic.      Mouth/Throat:      Mouth: Mucous membranes are moist.   Eyes:      General: No scleral icterus.  Cardiovascular:      Rate and Rhythm: Normal rate. Rhythm irregular.      Pulses: Normal pulses.      Heart sounds: Normal heart sounds. No murmur heard.     No friction rub. No gallop.   Pulmonary:      Effort: Pulmonary effort is normal.      Breath sounds: No wheezing, rhonchi or rales.   Abdominal:      General: Bowel sounds are normal. There is no distension.      Palpations: Abdomen is soft.      Tenderness: There is no abdominal tenderness.   Musculoskeletal:         General: No swelling or tenderness. Normal range of motion.      Cervical back: Normal range of motion and neck supple.   Skin:     General: Skin is warm and dry.   Neurological:      Mental Status: He is alert. He is disoriented.           Lines/Drains:              Lab Results: I have reviewed the following results:    Results from last 7 days   Lab Units 01/05/25  0859 01/03/25  0447 01/02/25  0620   WBC Thousand/uL 6.21   < > 5.75   HEMOGLOBIN g/dL 15.1   < > 13.8   HEMATOCRIT % 46.8   < > 43.4   PLATELETS Thousands/uL 114*   < > 93*   SEGS PCT %  --   --  52   LYMPHO PCT %  --   --  32   MONO PCT %  --   --  13*   EOS PCT %  --   --  2    < > = values in this interval not displayed.     Results from last 7 days   Lab Units 01/05/25  0859 01/02/25  0620 01/01/25  1134   SODIUM mmol/L 141   < > 139   POTASSIUM mmol/L 4.4   < > 4.9   CHLORIDE mmol/L 112*   < > 105   CO2 mmol/L 24   < > 28   BUN mg/dL 30*   < > 29*   CREATININE mg/dL 1.08   < > 1.36*   ANION GAP mmol/L 5   < > 6   CALCIUM mg/dL 9.1   < > 9.3   ALBUMIN g/dL  --   --  3.8   TOTAL BILIRUBIN mg/dL  --   --  0.82   ALK PHOS U/L  --   --  70   ALT U/L  --   --  17   AST U/L  --   --  32   GLUCOSE RANDOM mg/dL 80   < > 104    < > = values in this interval not displayed.     Results from last 7 days   Lab Units 01/01/25  1134   INR  1.10             Results from last 7 days   Lab Units 01/02/25  0620 01/01/25  1134   LACTIC ACID mmol/L 1.1 1.7   PROCALCITONIN ng/ml 0.11 0.09       Recent Cultures (last 7 days):   Results from last 7 days   Lab Units 01/01/25  1141 01/01/25  1134   BLOOD CULTURE  No Growth at 72 hrs. No Growth at 72 hrs.       Imaging Results Review: No pertinent imaging studies reviewed.  Other Study Results Review: No additional pertinent studies reviewed.    Last 24 Hours Medication List:     Current Facility-Administered Medications:     acetaminophen (TYLENOL) tablet 650 mg, Q4H PRN    albuterol inhalation solution 2.5 mg, Q6H PRN    apixaban (ELIQUIS) tablet 5 mg, BID    chlorhexidine (PERIDEX) 0.12 % oral rinse 15 mL, Q12H PROSPER    gabapentin (NEURONTIN) capsule 100 mg, HS    melatonin tablet 3 mg, HS    metoprolol (LOPRESSOR) injection 5 mg, Q6H PRN    metoprolol tartrate (LOPRESSOR) tablet 25 mg, Q6H    OLANZapine (ZyPREXA) IM injection 5 mg, Q4H  PRN    predniSONE tablet 20 mg, Daily    QUEtiapine (SEROquel) tablet 25 mg, After Lunch    senna (SENOKOT) tablet 17.2 mg, HS    sodium chloride 0.9 % infusion, Continuous, Last Rate: Stopped (01/05/25 0300)    Administrative Statements   Today, Patient Was Seen By: Jessica Magdaleno, DO  I have spent a total time of 30 minutes in caring for this patient on the day of the visit/encounter including Impressions, Counseling / Coordination of care, Documenting in the medical record, Reviewing / ordering tests, medicine, procedures  , Obtaining or reviewing history  , and Communicating with other healthcare professionals .    **Please Note: This note may have been constructed using a voice recognition system.**

## 2025-01-05 NOTE — PLAN OF CARE
Problem: Potential for Falls  Goal: Patient will remain free of falls  Description: INTERVENTIONS:  - Educate patient/family on patient safety including physical limitations  - Instruct patient to call for assistance with activity   - Consult OT/PT to assist with strengthening/mobility   - Keep Call bell within reach  - Keep bed low and locked with side rails adjusted as appropriate  - Keep care items and personal belongings within reach  - Initiate and maintain comfort rounds  - Make Fall Risk Sign visible to staff  - Offer Toileting every 2 Hours, in advance of need  - Initiate/Maintain Bed alarm  - Apply yellow socks and bracelet for high fall risk patients  - Consider moving patient to room near nurses station  Outcome: Progressing     Problem: PAIN - ADULT  Goal: Verbalizes/displays adequate comfort level or baseline comfort level  Description: Interventions:  - Encourage patient to monitor pain and request assistance  - Assess pain using appropriate pain scale  - Administer analgesics based on type and severity of pain and evaluate response  - Implement non-pharmacological measures as appropriate and evaluate response  - Consider cultural and social influences on pain and pain management  - Notify physician/advanced practitioner if interventions unsuccessful or patient reports new pain  Outcome: Progressing     Problem: INFECTION - ADULT  Goal: Absence or prevention of progression during hospitalization  Description: INTERVENTIONS:  - Assess and monitor for signs and symptoms of infection  - Monitor lab/diagnostic results  - Monitor all insertion sites, i.e. indwelling lines, tubes, and drains  - Monitor endotracheal if appropriate and nasal secretions for changes in amount and color  - [place] appropriate cooling/warming therapies per order  - Administer medications as ordered  - Instruct and encourage patient and family to use good hand hygiene technique  - Identify and instruct in appropriate isolation  precautions for identified infection/condition  Outcome: Progressing  Goal: Absence of fever/infection during neutropenic period  Description: INTERVENTIONS:  - Monitor WBC    Outcome: Progressing     Problem: SAFETY ADULT  Goal: Patient will remain free of falls  Description: INTERVENTIONS:  - Educate patient/family on patient safety including physical limitations  - Instruct patient to call for assistance with activity   - Consult OT/PT to assist with strengthening/mobility   - Keep Call bell within reach  - Keep bed low and locked with side rails adjusted as appropriate  - Keep care items and personal belongings within reach  - Initiate and maintain comfort rounds  - Make Fall Risk Sign visible to staff  - Offer Toileting every 2 Hours, in advance of need  - Initiate/Maintain Bed alarm  - Apply yellow socks and bracelet for high fall risk patients  - Consider moving patient to room near nurses station  Outcome: Progressing  Goal: Maintain or return to baseline ADL function  Description: INTERVENTIONS:  -  Assess patient's ability to carry out ADLs; assess patient's baseline for ADL function and identify physical deficits which impact ability to perform ADLs (bathing, care of mouth/teeth, toileting, grooming, dressing, etc.)  - Assess/evaluate cause of self-care deficits   - Assess range of motion  - Assess patient's mobility; develop plan if impaired  - Assess patient's need for assistive devices and provide as appropriate  - Encourage maximum independence but intervene and supervise when necessary  - Involve family in performance of ADLs  - Assess for home care needs following discharge   - Consider OT consult to assist with ADL evaluation and planning for discharge  - Provide patient education as appropriate  Outcome: Progressing  Goal: Maintains/Returns to pre admission functional level  Description: INTERVENTIONS:  - Perform AM-PAC 6 Click Basic Mobility/ Daily Activity assessment daily.  - Set and  communicate daily mobility goal to care team and patient/family/caregiver.   - Collaborate with rehabilitation services on mobility goals if consulted  - Perform Range of Motion 3 times a day.  - Reposition patient every 2 hours.  - Dangle patient 3 times a day  - Record patient progress and toleration of activity level   Outcome: Progressing     Problem: DISCHARGE PLANNING  Goal: Discharge to home or other facility with appropriate resources  Description: INTERVENTIONS:  - Identify barriers to discharge w/patient and caregiver  - Arrange for needed discharge resources and transportation as appropriate  - Identify discharge learning needs (meds, wound care, etc.)  - Arrange for interpretive services to assist at discharge as needed  - Refer to Case Management Department for coordinating discharge planning if the patient needs post-hospital services based on physician/advanced practitioner order or complex needs related to functional status, cognitive ability, or social support system  Outcome: Progressing     Problem: Knowledge Deficit  Goal: Patient/family/caregiver demonstrates understanding of disease process, treatment plan, medications, and discharge instructions  Description: Complete learning assessment and assess knowledge base.  Interventions:  - Provide teaching at level of understanding  - Provide teaching via preferred learning methods  Outcome: Progressing     Problem: Prexisting or High Potential for Compromised Skin Integrity  Goal: Skin integrity is maintained or improved  Description: INTERVENTIONS:  - Identify patients at risk for skin breakdown  - Assess and monitor skin integrity  - Assess and monitor nutrition and hydration status  - Monitor labs   - Assess for incontinence   - Turn and reposition patient  - Assist with mobility/ambulation  - Relieve pressure over bony prominences  - Avoid friction and shearing  - Provide appropriate hygiene as needed including keeping skin clean and dry  -  Evaluate need for skin moisturizer/barrier cream  - Collaborate with interdisciplinary team   - Patient/family teaching  - Consider wound care consult   Outcome: Progressing     Problem: Nutrition/Hydration-ADULT  Goal: Nutrient/Hydration intake appropriate for improving, restoring or maintaining nutritional needs  Description: Monitor and assess patient's nutrition/hydration status for malnutrition. Collaborate with interdisciplinary team and initiate plan and interventions as ordered.  Monitor patient's weight and dietary intake as ordered or per policy. Utilize nutrition screening tool and intervene as necessary. Determine patient's food preferences and provide high-protein, high-caloric foods as appropriate.     INTERVENTIONS:  - Monitor oral intake, urinary output, labs, and treatment plans  - Assess nutrition and hydration status and recommend course of action  - Evaluate amount of meals eaten  - Assist patient with eating if necessary   - Allow adequate time for meals  - Recommend/ encourage appropriate diets, oral nutritional supplements, and vitamin/mineral supplements  - Order, calculate, and assess calorie counts as needed  - Recommend, monitor, and adjust tube feedings and TPN/PPN based on assessed needs  - Assess need for intravenous fluids  - Provide specific nutrition/hydration education as appropriate  - Include patient/family/caregiver in decisions related to nutrition  Outcome: Progressing      Detail Level: Detailed

## 2025-01-05 NOTE — PHYSICAL THERAPY NOTE
PHYSICAL THERAPY EVALUATION  NAME:  aStish Novak  DATE: 01/05/25    AGE:   89 y.o.  Mrn:   [id number]  Principal problem: Principal Problem:    Shortness of breath  Active Problems:    Dementia (HCC)    Chronic thrombocytopenia (HCC)    STEPHON (acute kidney injury) (HCC)    Atrial fibrillation with RVR (HCC)    Sepsis (HCC)      Vitals:    01/04/25 2119 01/04/25 2122 01/04/25 2222 01/05/25 0747   BP: 116/75 116/75 125/72 104/67   BP Location:       Pulse: 84 95 72 60   Resp:   13    Temp: (!) 97.2 °F (36.2 °C) (!) 97.2 °F (36.2 °C)     TempSrc:       SpO2: 98% 98% 97% 99%   Weight:       Height:           Length Of Stay: 4  Performed at least 2 patient identifiers during session: Name and ID bracelet  PHYSICAL THERAPY EVALUATION :    01/05/25 1030   PT Last Visit   PT Visit Date 01/05/25   Note Type   Note type Evaluation  (and treatment)   Pain Assessment   Pain Assessment Tool 0-10   Pain Score No Pain   Restrictions/Precautions   Weight Bearing Precautions Per Order No   Other Precautions Bed Alarm;Chair Alarm;Cognitive;Impulsive;Contact/isolation;Droplet precautions  (+ COVID)   Home Living   Type of Home SNF  ([facility])   Home Equipment Walker   Additional Comments (S)  Per H&P 1/1/2025: He frequently demonstrates word salad. She reports that he was completely at his baseline mentation status this morning when EMS was called.  He uses a walker and can feed himself.   Prior Function   Falls in the last 6 months   (unable to quantify, but pt had fall per EMR on prior admission)   Vocational Retired   General   Family/Caregiver Present No   Cognition   Overall Cognitive Status Impaired   Arousal/Participation Cooperative   Orientation Level Disoriented to place;Disoriented to time;Disoriented to situation   Memory Decreased recall of precautions;Decreased recall of recent events;Decreased short term memory;Decreased long term memory   Following Commands Follows one step commands inconsistently  (despite  "instructions, pt does not consistently follow motor mobility requests)   Subjective   Subjective Pt cooperative for most of session, attempts top take gown off multiple times during eval despite instruction.  + coughing in room   RUE Assessment   RUE Assessment   (shoulder flexion/abd AAROM to 90, noted R3rd finger flexion deformority)   LUE Assessment   LUE Assessment   (shoulder flexion/abd AAROM to 90)   RLE Assessment   RLE Assessment   (tested to 3 for hip flexion, knee ext, ankle DF--limited overpressure testing due to command following)   LLE Assessment   LLE Assessment   (tested to 3 for hip flexion, knee ext, ankle DF--limited overpressure testing due to command following)   Light Touch   RLE Light Touch Not tested  (B feet cool to touch, NT due to anticipated inconsistent responses)   LLE Light Touch Not tested  (B feet cool to touch, NT due to anticipated inconsistent responses)   Bed Mobility   Supine to Sit 3  Moderate assistance   Additional items Assist x 1;Bedrails;Increased time required;Verbal cues;LE management  (increased time, initially unilateral UE, progressing to B UE support.)   Additional Comments Pt needed increased time @ EOB, stating \"whoa\", but then attempted to stand   Transfers   Sit to Stand 4  Minimal assistance   Additional items Increased time required;Verbal cues;Armrests   Stand to Sit 2  Maximal assistance   Additional items (S)  Assist x 1;Increased time required;Bed elevated;Verbal cues;Armrests  (substantial A for completion of turn and approach to sit @ toilet, walker abandonment, limited facilitation for pt to use L grab bar in bath. Took >1 min to complete. Limited command following plus pt trying to undress)   Ambulation/Elevation   Gait pattern Step through pattern;Shuffling  (increased walker advancement, difficulty negotiating walker around obstacles and threshold)   Gait Assistance 3  Moderate assist   Additional items Assist x 1;Verbal cues;Tactile cues   Assistive " Device Rolling walker   Distance 20'   Stair Management Assistance Not tested   Balance   Static Sitting Fair +   Static Standing Poor +   Ambulatory Poor -  (regressing during longer amb distances)   Endurance Deficit   Endurance Deficit Yes   Endurance Deficit Description audible wheezing vs stridor mid and post ambulation, however Masimo on Pt's Cold toe and difficult to get accurate read.  RN and PCA aware   Activity Tolerance   Activity Tolerance Patient limited by pain;Patient limited by fatigue  (SOB)   Nurse Made Aware care coordination w/ Ismael and Gilbert from nursing     Assessment:   Pt is a 89 y.o. male seen for PT evaluation s/p admit to [facility] on 1/1/2025 w/ Shortness of breath, + COVID.  Order placed for PT.      Prior to admission: Pt was a resident on SNF, but (per EMR) was reportedly amb w/walker--uncertain of how much physical A pt needed to complete task.  Upon evaluation: Pt needed mod A for bed mobility, between min to max A for transfers (more A for stand->sit), and amb w/ walker for 20' w/ mod A.      Pt's clinical presentation is currently unstable/unpredictable given the functional mobility deficits above, especially (but not limited to) gait deviations and decreased functional mobility tolerance, and combined with medical complications including abnormal renal lab values, abnormal platelets (chronic?), low SpO2 values, and A fib w/ RVR,impulsivity.  Pt MAY NOT be at his mobility baseline.  He is at risk for falls based on impulsivity, impaired balance, impaired judgement, decreased safety awareness, decreased cognition, and  fall during admission last year.       During this admission, pt would benefit from continued skilled inpatient PT in the acute care setting in order to address deficits as defined above to maximize function and mobility.      Recommendations:    From a PT standpoint, recommend next several sessions focus on continued mobility trials w/rolling walker, goal  directed mobility.    Recommend staff offer amb to bathroom w/ walker for toileting to promote mobility as part of toileting/mobility program   Prognosis Guarded   Problem List Decreased strength;Decreased range of motion;Decreased endurance;Impaired balance;Decreased mobility;Decreased coordination;Decreased cognition;Impaired judgement;Decreased safety awareness  (? vision, hearing, sensation; gait devitions)   Barriers to Discharge Decreased caregiver support   Goals   Patient Goals none stated, but agreeable to amb to bathroom   STG Expiration Date 01/15/25   Short Term Goal #1 Goals: Pt will: Perform rolling  and supine<>sit bed mobility tasks with no more than min A to prepare for transfers and reposition in bed. Perform transfers with no more than min A to promote proper hand placement and approach. Perform ambulation with LRAD for up to 50' with no more than min A to increase Indep in prior living environment and promote proper use of assistive device.   PT Treatment Day 1   Plan   Treatment/Interventions Functional transfer training;LE strengthening/ROM;Therapeutic exercise;Endurance training;Cognitive reorientation;Patient/family training;Equipment eval/education;Bed mobility;Gait training;Spoke to nursing   PT Frequency 1-2x/wk   Discharge Recommendation   Rehab Resource Intensity Level, PT II (Moderate Resource Intensity)   Equipment Recommended Walker   AM-PAC Basic Mobility Inpatient   Turning in Flat Bed Without Bedrails 2   Lying on Back to Sitting on Edge of Flat Bed Without Bedrails 2   Moving Bed to Chair 2   Standing Up From Chair Using Arms 2   Walk in Room 2   Climb 3-5 Stairs With Railing 1   Basic Mobility Inpatient Raw Score 11   Basic Mobility Standardized Score 30.25   [facility] Highest Level Of Mobility   -[facility] Goal 4: Move to chair/commode   -[facility] Achieved 6: Walk 10 steps or more   Additional Treatment Session   Start Time 1015   End Time 1030   Treatment Assessment Pt needed A of  "1 for continued transfers and amb, but is inconsistent w/ command following despite increased verbal and tactile  instruction for goal directed mobility tasks. Skilled PT recommended to progress pt towarg treatment goals   Equipment Use rolling walker   Additional Treatment Day 1   Exercises   Neuro re-ed Transfers min A sit->stand from toilet once hands in place, but mod A stand to sit @ bedside with encouragement for steppage transfer toward head of bed. Amb w/rolling walker 4'+16' w/min to progressing mod A. Standing tolerance x 4 min in between trials due to limited command following, motor planning fluctuating between min<>max A for restropulsion. Gait deviations including increased walker advancement to eventual HOLDING walker off of floor to amb. sit-> supine S, but needed A of 2 for repositioning toward HOB. Pt able to bridge in bed w/o A (goal directed w/ LB dressing/Pad).   End of Consult   Patient Position at End of Consult All needs within reach;Bed/Chair alarm activated;Supine  (PCA reported to keep pt @ Zone 1 and not zone 2 for bed alarm)   (Please find full objective findings from PT assessment regarding body systems outlined above).     The patient's AM-[facility] Basic Mobility Inpatient Short Form Raw Score is 11. A Raw score of less than or equal to 16 suggests the patient may benefit from discharge to post-acute rehabilitation services, however pt is from LTC SNF.     Please refer to therapist recommendation for discharge planning given other factors that may influence destination.     Adapted from Delta CONTEH, Lui CARSON, Mala J, Cristin CARSON. Association of -[facility] “6-Clicks” Basic Mobility and Daily Activity Scores With Discharge Destination. Physical Therapy, 2021;101:1-9. DOI: [email or web address]    Portions of the record may have been created with voice recognition software.  Occasional wrong word or \"sound a like\" substitutions may have occurred due to the inherent limitations of voice recognition " software.  Read the chart carefully and recognize, using context, where substitutions have occurred

## 2025-01-05 NOTE — PLAN OF CARE
Problem: PHYSICAL THERAPY ADULT  Goal: Performs mobility at highest level of function for planned discharge setting.  See evaluation for individualized goals.  Description: Treatment/Interventions: Functional transfer training, LE strengthening/ROM, Therapeutic exercise, Endurance training, Cognitive reorientation, Patient/family training, Equipment eval/education, Bed mobility, Gait training, Spoke to nursing  Equipment Recommended: Walker       See flowsheet documentation for full assessment, interventions and recommendations.  Note: Prognosis: Guarded  Problem List: Decreased strength, Decreased range of motion, Decreased endurance, Impaired balance, Decreased mobility, Decreased coordination, Decreased cognition, Impaired judgement, Decreased safety awareness (? vision, hearing, sensation; gait devitions)  Assessment: Pt is a 89 y.o. male seen for PT evaluation s/p admit to [facility] on 1/1/2025 w/ Shortness of breath, + COVID.  Order placed for PT.      Prior to admission: Pt was a resident on SNF, but (per EMR) was reportedly amb w/walker--uncertain of how much physical A pt needed to complete task.  Upon evaluation: Pt needed mod A for bed mobility, between min to max A for transfers (more A for stand->sit), and amb w/ walker for 20' w/ mod A.      Pt's clinical presentation is currently unstable/unpredictable given the functional mobility deficits above, especially (but not limited to) gait deviations and decreased functional mobility tolerance, and combined with medical complications including abnormal renal lab values, abnormal platelets (chronic?), low SpO2 values, and A fib w/ RVR,impulsivity.  Pt MAY NOT be at his mobility baseline.  He is at risk for falls based on impulsivity, impaired balance, impaired judgement, decreased safety awareness, decreased cognition, and  fall during admission last year .       During this admission, pt would benefit from continued skilled inpatient PT in the  acute care setting in order to address deficits as defined above to maximize function and mobility.      Recommendations:     From a PT standpoint, recommend next several sessions focus on continued mobility trials w/rolling walker, goal directed mobility.     Recommend staff offer amb to bathroom w/ walker for toileting to promote mobility as part of toileting/mobility program  Barriers to Discharge: Decreased caregiver support     Rehab Resource Intensity Level, PT: II (Moderate Resource Intensity)    See flowsheet documentation for full assessment.

## 2025-01-06 VITALS
RESPIRATION RATE: 18 BRPM | OXYGEN SATURATION: 96 % | SYSTOLIC BLOOD PRESSURE: 110 MMHG | WEIGHT: 151.01 LBS | HEART RATE: 84 BPM | TEMPERATURE: 96.4 F | DIASTOLIC BLOOD PRESSURE: 56 MMHG | BODY MASS INDEX: 21.62 KG/M2 | HEIGHT: 70 IN

## 2025-01-06 LAB
ANION GAP SERPL CALCULATED.3IONS-SCNC: 5 MMOL/L (ref 4–13)
BACTERIA BLD CULT: NORMAL
BACTERIA BLD CULT: NORMAL
BUN SERPL-MCNC: 28 MG/DL (ref 5–25)
CALCIUM SERPL-MCNC: 9.2 MG/DL (ref 8.4–10.2)
CHLORIDE SERPL-SCNC: 107 MMOL/L (ref 96–108)
CO2 SERPL-SCNC: 29 MMOL/L (ref 21–32)
CREAT SERPL-MCNC: 1.06 MG/DL (ref 0.6–1.3)
ERYTHROCYTE [DISTWIDTH] IN BLOOD BY AUTOMATED COUNT: 14 % (ref 11.6–15.1)
GFR SERPL CREATININE-BSD FRML MDRD: 60 ML/MIN/1.73SQ M
GLUCOSE SERPL-MCNC: 109 MG/DL (ref 65–140)
HCT VFR BLD AUTO: 48.1 % (ref 36.5–49.3)
HGB BLD-MCNC: 15.7 G/DL (ref 12–17)
MCH RBC QN AUTO: 32.8 PG (ref 26.8–34.3)
MCHC RBC AUTO-ENTMCNC: 32.6 G/DL (ref 31.4–37.4)
MCV RBC AUTO: 101 FL (ref 82–98)
PLATELET # BLD AUTO: 147 THOUSANDS/UL (ref 149–390)
PMV BLD AUTO: 10.5 FL (ref 8.9–12.7)
POTASSIUM SERPL-SCNC: 3.6 MMOL/L (ref 3.5–5.3)
RBC # BLD AUTO: 4.78 MILLION/UL (ref 3.88–5.62)
SODIUM SERPL-SCNC: 141 MMOL/L (ref 135–147)
WBC # BLD AUTO: 7.48 THOUSAND/UL (ref 4.31–10.16)

## 2025-01-06 PROCEDURE — 85027 COMPLETE CBC AUTOMATED: CPT

## 2025-01-06 PROCEDURE — 80048 BASIC METABOLIC PNL TOTAL CA: CPT

## 2025-01-06 PROCEDURE — 99239 HOSP IP/OBS DSCHRG MGMT >30: CPT | Performed by: INTERNAL MEDICINE

## 2025-01-06 RX ORDER — QUETIAPINE FUMARATE 25 MG/1
25 TABLET, FILM COATED ORAL
Qty: 30 TABLET | Refills: 0
Start: 2025-01-06

## 2025-01-06 RX ORDER — METOPROLOL TARTRATE 25 MG/1
25 TABLET, FILM COATED ORAL EVERY 6 HOURS
Qty: 120 TABLET | Refills: 0
Start: 2025-01-06

## 2025-01-06 RX ORDER — PREDNISONE 20 MG/1
20 TABLET ORAL DAILY
Qty: 1 TABLET | Refills: 0
Start: 2025-01-07 | End: 2025-01-08

## 2025-01-06 RX ORDER — POTASSIUM CHLORIDE 1500 MG/1
40 TABLET, EXTENDED RELEASE ORAL ONCE
Status: COMPLETED | OUTPATIENT
Start: 2025-01-06 | End: 2025-01-06

## 2025-01-06 RX ADMIN — POTASSIUM CHLORIDE 40 MEQ: 1500 TABLET, EXTENDED RELEASE ORAL at 13:35

## 2025-01-06 RX ADMIN — METOPROLOL TARTRATE 25 MG: 25 TABLET, FILM COATED ORAL at 10:22

## 2025-01-06 RX ADMIN — APIXABAN 5 MG: 5 TABLET, FILM COATED ORAL at 10:22

## 2025-01-06 RX ADMIN — METOPROLOL TARTRATE 25 MG: 25 TABLET, FILM COATED ORAL at 13:35

## 2025-01-06 RX ADMIN — QUETIAPINE FUMARATE 25 MG: 25 TABLET ORAL at 13:35

## 2025-01-06 RX ADMIN — PREDNISONE 20 MG: 20 TABLET ORAL at 10:22

## 2025-01-06 NOTE — PLAN OF CARE
Problem: Potential for Falls  Goal: Patient will remain free of falls  Description: INTERVENTIONS:  - Educate patient/family on patient safety including physical limitations  - Instruct patient to call for assistance with activity   - Consult OT/PT to assist with strengthening/mobility   - Keep Call bell within reach  - Keep bed low and locked with side rails adjusted as appropriate  - Keep care items and personal belongings within reach  - Initiate and maintain comfort rounds  - Make Fall Risk Sign visible to staff  - Offer Toileting every 2 Hours, in advance of need  - Initiate/Maintain Bed alarm  - Apply yellow socks and bracelet for high fall risk patients  - Consider moving patient to room near nurses station  Outcome: Progressing     Problem: SAFETY ADULT  Goal: Patient will remain free of falls  Description: INTERVENTIONS:  - Educate patient/family on patient safety including physical limitations  - Instruct patient to call for assistance with activity   - Consult OT/PT to assist with strengthening/mobility   - Keep Call bell within reach  - Keep bed low and locked with side rails adjusted as appropriate  - Keep care items and personal belongings within reach  - Initiate and maintain comfort rounds  - Make Fall Risk Sign visible to staff  - Offer Toileting every 2 Hours, in advance of need  - Initiate/Maintain Bed alarm  - Apply yellow socks and bracelet for high fall risk patients  - Consider moving patient to room near nurses station  Outcome: Progressing  Goal: Maintain or return to baseline ADL function  Description: INTERVENTIONS:  -  Assess patient's ability to carry out ADLs; assess patient's baseline for ADL function and identify physical deficits which impact ability to perform ADLs (bathing, care of mouth/teeth, toileting, grooming, dressing, etc.)  - Assess/evaluate cause of self-care deficits   - Assess range of motion  - Assess patient's mobility; develop plan if impaired  - Assess patient's  need for assistive devices and provide as appropriate  - Encourage maximum independence but intervene and supervise when necessary  - Involve family in performance of ADLs  - Assess for home care needs following discharge   - Consider OT consult to assist with ADL evaluation and planning for discharge  - Provide patient education as appropriate  Outcome: Progressing  Goal: Maintains/Returns to pre admission functional level  Description: INTERVENTIONS:  - Perform AM-PAC 6 Click Basic Mobility/ Daily Activity assessment daily.  - Set and communicate daily mobility goal to care team and patient/family/caregiver.   - Collaborate with rehabilitation services on mobility goals if consulted  - Perform Range of Motion 3 times a day.  - Reposition patient every 2 hours.  - Dangle patient 3 times a day  - Stand patient 3 times a day  - Ambulate patient 3 times a day  - Out of bed to chair 3 times a day   - Out of bed for meals 3 times a day  - Out of bed for toileting  - Record patient progress and toleration of activity level   Outcome: Progressing     Problem: DISCHARGE PLANNING  Goal: Discharge to home or other facility with appropriate resources  Description: INTERVENTIONS:  - Identify barriers to discharge w/patient and caregiver  - Arrange for needed discharge resources and transportation as appropriate  - Identify discharge learning needs (meds, wound care, etc.)  - Arrange for interpretive services to assist at discharge as needed  - Refer to Case Management Department for coordinating discharge planning if the patient needs post-hospital services based on physician/advanced practitioner order or complex needs related to functional status, cognitive ability, or social support system  Outcome: Progressing     Problem: Nutrition/Hydration-ADULT  Goal: Nutrient/Hydration intake appropriate for improving, restoring or maintaining nutritional needs  Description: Monitor and assess patient's nutrition/hydration status for  malnutrition. Collaborate with interdisciplinary team and initiate plan and interventions as ordered.  Monitor patient's weight and dietary intake as ordered or per policy. Utilize nutrition screening tool and intervene as necessary. Determine patient's food preferences and provide high-protein, high-caloric foods as appropriate.     INTERVENTIONS:  - Monitor oral intake, urinary output, labs, and treatment plans  - Assess nutrition and hydration status and recommend course of action  - Evaluate amount of meals eaten  - Assist patient with eating if necessary   - Allow adequate time for meals  - Recommend/ encourage appropriate diets, oral nutritional supplements, and vitamin/mineral supplements  - Order, calculate, and assess calorie counts as needed  - Recommend, monitor, and adjust tube feedings and TPN/PPN based on assessed needs  - Assess need for intravenous fluids  - Provide specific nutrition/hydration education as appropriate  - Include patient/family/caregiver in decisions related to nutrition  Outcome: Progressing

## 2025-01-06 NOTE — ASSESSMENT & PLAN NOTE
Recent Labs     01/04/25  0316 01/05/25  0859 01/06/25  1155   CREATININE 1.35* 1.08 1.06   EGFR 44 58 60     Estimated Creatinine Clearance: 41.4 mL/min (by C-G formula based on SCr of 1.08 mg/dL).    Baseline Cr ~1.0-1.1  Euvolemic on exam

## 2025-01-06 NOTE — ASSESSMENT & PLAN NOTE
History atrial fibrillation, on Eliquis 2.5 mg twice daily currently-confirmed with facility  At home, patient takes Lopressor 37.5 mg twice daily.  Overnight, pt missed his lopressor dose.  He went into Afib with RVR.  One time dose of Lopressor 5mg was administered.  Pt's HR stabilized.        Plan:  Continue with Lopressor 25 mg every 6 hours   Switched to Eliquis 5mg BID as patient does not meet criteria for lower dose

## 2025-01-06 NOTE — DISCHARGE SUMMARY
Discharge Summary - Hospitalist   Name: Satish Novak 89 y.o. male I MRN: [id number]  Unit/Bed#: S -01 I Date of Admission: 1/1/2025   Date of Service: 1/6/2025 I Hospital Day: 5     Assessment & Plan  Shortness of breath  Patient presented initially with shortness of breath/tachypnea at his facility and was found to have O2 sats in the 80's with audible wheezing.  In ED, received nebulizer treatments with noted improvement.  He is now on room air saturating in the upper 90s.  No increased sputum production with cough, no history of COPD.  He has a home rescue inhaler but only uses it every couple of months per facility.  Patient is afebrile with no leukocytosis. Pro-Alfred and lactic acid within normal range.    Chest x-ray not showing any obvious infiltrates.    Received cefepime and vancomycin in the ER   RP2 panel positive for coronovirus OC43  On exam, improved breath sounds, no wheezing appreciated.  Plan:  Given prescription for one tablet of prednisone 20mg tomorrow, 01/07  Will completed 5 day course on 01/07    Atrial fibrillation with RVR (HCC)  History atrial fibrillation, on Eliquis 2.5 mg twice daily currently-confirmed with facility  At home, patient takes Lopressor 37.5 mg twice daily.  Overnight, pt missed his lopressor dose.  He went into Afib with RVR.  One time dose of Lopressor 5mg was administered.  Pt's HR stabilized.        Plan:  Continue with Lopressor 25 mg every 6 hours   Switched to Eliquis 5mg BID as patient does not meet criteria for lower dose       Sepsis (HCC)  Presenting with tachycardia and tachypnea.  Afebrile.  No leukocytosis.  Lactic acid within normal range.    Patient received 1 L fluid bolus, 1 dose rocephin ER  On exam, appears euvolemic.    RP panel positive for coronovirus OC43    Plan:  See primary problem   Continue to monitor off antibiotics  Bcx2 no growth at 4 days   Dementia (HCC)  Per [facility] facility, at baseline patient understands his name but mostly uses  word salad.  He does not understand the time, place, situation.  He is able to feed himself.  She reports that there were no acute mental changes noticed, he was at his baseline when EMS got there    Plan:  Delirium precautions frequent reorienting.  Minimize overnight interruptions  Fall precautions   C/w Seroquel 25 mg at bedtime.   Chronic thrombocytopenia (HCC)  01/06/25 Platelets 147K  Platelets 130k on adx, similar to prior values in the past.  Liver enzymes within normal range on adx.  No evidence of bleeding at this time.   Stable     STEPHON (acute kidney injury) (HCC)  Recent Labs     01/04/25  0316 01/05/25  0859 01/06/25  1155   CREATININE 1.35* 1.08 1.06   EGFR 44 58 60     Estimated Creatinine Clearance: 41.4 mL/min (by C-G formula based on SCr of 1.08 mg/dL).    Baseline Cr ~1.0-1.1  Euvolemic on exam        Medical Problems       Resolved Problems  Date Reviewed: 1/31/2024   None       Discharging Physician / Practitioner: Maribel Nichols DO  PCP: Cecelia Jarrell MD  Admission Date:   Admission Orders (From admission, onward)       Ordered        01/01/25 1422  INPATIENT ADMISSION  Once                          Discharge Date: 01/06/25    Consultations During Hospital Stay:  PT    Procedures Performed:   none    Significant Findings / Test Results:   XR chest with decubitus left   Final Result by Tre Tompkins DO (01/02 1754)      No evidence acute cardiopulmonary disease on this single decubitus view.      No evidence of pneumoperitoneum.                  Resident: Francis Nolasco I, the attending radiologist, have reviewed the images and agree with the final report above.      Workstation performed: [id number]         XR abdomen complete inc upright and/or decubitus   Final Result by Tre Tompkins DO (01/02 1214)      Nonobstructive bowel gas pattern.      No evidence of pneumoperitoneum.               Resident: Francis Nolasco I, the attending radiologist, have reviewed the images and  agree with the final report above.      Workstation performed: [id number]         XR chest 1 view portable   ED Interpretation by Noreen Tran MD (01/01 1314)   Questionable infiltrate to the R lower lung fields.       Final Result by Giovani Cid MD (01/01 1726)   Curvilinear density over the right hemidiaphragm may represent atelectasis although motion artifact/free air is possible. A repeat obstruction series with left lateral decubitus view is advised.      This was discussed with Dr. Peralta at 5:15 p.m. via secure text            Workstation performed: [id number]              Incidental Findings:   none     Test Results Pending at Discharge (will require follow up):   none     Outpatient Tests Requested:  none    Complications: None    Reason for Admission: Shortness of breath    Hospital Course:   Hospital Course: 89-year-old male with past medical history of dementia and A-fib on Eliquis who presented from [facility] due to trouble breathing and desaturation of O2 in the 80s per facility.  He was also found to be in A-fib with RVR by EMS.  In ED, he was given 1 dose of Cardizem 10 Mg, nebulizer treatments, 1 dose of vancomycin and cefepime.  He had noted improvement of oxygen saturation in the 90s on room air.  COVID/flu negative.  RP 2 panel was positive for coronavirus OC43.  Pt started on prednisone pulse course with noted improving URI.  His Afib was controlled with QID lopressor.  Throughout hospital course, patients mentation waxed and waned with intermittent episodes of agitation, which was managed with daily Seroquel and as needed IM Zyprexa.  He was discharged with the following changes in his medications: Lopressor 25 Mg every 6 hours and Seroquel 25 Mg at bedtime.  Advised to follow-up with PCP and cardiologist for further evaluation of medication changes.  He was given 1 dose of prednisone prescription to complete a 5-day steroid course.     Patient is medically stable for  "discharge.     Please see above list of diagnoses and related plan for additional information.     Condition at Discharge: stable    Discharge Day Visit / Exam:   Subjective: No acute events overnight.  Patient seen and examined this morning at bedside. AAOx 0, word salad but pleasantly confused.  Denied any complaint.  He appears comfortable on RA.    Vitals: Blood Pressure: 134/70 (01/06/25 1335)  Pulse: 90 (01/06/25 1335)  Temperature: (!) 96.1 °F (35.6 °C) (01/06/25 0755)  Temp Source: Oral (01/02/25 2148)  Respirations: 16 (01/06/25 0200)  Height: 5' 10\" (177.8 cm) (01/02/25 2300)  Weight - Scale: 68.5 kg (151 lb 0.2 oz) (01/02/25 2300)  SpO2: (!) 83 % (01/06/25 0755)  Physical Exam  Vitals and nursing note reviewed.   Constitutional:       General: He is not in acute distress.     Appearance: He is not ill-appearing.      Comments: Pleasantly confused      HENT:      Head: Normocephalic and atraumatic.      Mouth/Throat:      Mouth: Mucous membranes are moist.   Eyes:      General:         Right eye: No discharge.         Left eye: No discharge.      Extraocular Movements: Extraocular movements intact.      Conjunctiva/sclera: Conjunctivae normal.      Pupils: Pupils are equal, round, and reactive to light.   Cardiovascular:      Rate and Rhythm: Normal rate. Rhythm irregular.      Pulses: Normal pulses.   Pulmonary:      Effort: Pulmonary effort is normal. No respiratory distress.      Breath sounds: No wheezing.      Comments: 97% O2 saturation on room air  Abdominal:      General: There is no distension.      Palpations: Abdomen is soft.      Tenderness: There is no abdominal tenderness. There is no guarding.   Musculoskeletal:      Right lower leg: No edema.      Left lower leg: No edema.   Skin:     General: Skin is warm and dry.      Capillary Refill: Capillary refill takes less than 2 seconds.      Coloration: Skin is not jaundiced.   Neurological:      Mental Status: He is alert. Mental status is at " baseline. He is disoriented.        Discussion with Family: Updated  (guardian) via phone.    Discharge instructions/Information to patient and family:   See after visit summary for information provided to patient and family.      Provisions for Follow-Up Care:  See after visit summary for information related to follow-up care and any pertinent home health orders.      Mobility at time of Discharge:   Basic Mobility Inpatient Raw Score: 11  JH-HLM Goal: 4: Move to chair/commode  JH-HLM Achieved: 2: Bed activities/Dependent transfer  HLM Goal NOT achieved. Continue to encourage mobility in post discharge setting.     Disposition:   Other Skilled Nursing Facility at [facility].    Planned Readmission: No    Discharge Medications:  See after visit summary for reconciled discharge medications provided to patient and/or family.      Administrative Statements   Discharge Statement:  I have spent a total time of 45 minutes in caring for this patient on the day of the visit/encounter. >30 minutes of time was spent on: Counseling / Coordination of care, Documenting in the medical record, Reviewing / ordering tests, medicine, procedures  , and Communicating with other healthcare professionals .    **Please Note: This note may have been constructed using a voice recognition system**

## 2025-01-06 NOTE — PLAN OF CARE
Problem: Potential for Falls  Goal: Patient will remain free of falls  Description: INTERVENTIONS:  - Educate patient/family on patient safety including physical limitations  - Instruct patient to call for assistance with activity   - Consult OT/PT to assist with strengthening/mobility   - Keep Call bell within reach  - Keep bed low and locked with side rails adjusted as appropriate  - Keep care items and personal belongings within reach  - Initiate and maintain comfort rounds  - Make Fall Risk Sign visible to staff  - Offer Toileting every 2 Hours, in advance of need  - Initiate/Maintain bed alarm  - Obtain necessary fall risk management equipment  - Apply yellow socks and bracelet for high fall risk patients  - Consider moving patient to room near nurses station  Outcome: Progressing     Problem: PAIN - ADULT  Goal: Verbalizes/displays adequate comfort level or baseline comfort level  Description: Interventions:  - Encourage patient to monitor pain and request assistance  - Assess pain using appropriate pain scale  - Administer analgesics based on type and severity of pain and evaluate response  - Implement non-pharmacological measures as appropriate and evaluate response  - Consider cultural and social influences on pain and pain management  - Notify physician/advanced practitioner if interventions unsuccessful or patient reports new pain  Outcome: Progressing     Problem: INFECTION - ADULT  Goal: Absence or prevention of progression during hospitalization  Description: INTERVENTIONS:  - Assess and monitor for signs and symptoms of infection  - Monitor lab/diagnostic results  - Monitor all insertion sites, i.e. indwelling lines, tubes, and drains  - Monitor endotracheal if appropriate and nasal secretions for changes in amount and color  - [place] appropriate cooling/warming therapies per order  - Administer medications as ordered  - Instruct and encourage patient and family to use good hand hygiene  technique  - Identify and instruct in appropriate isolation precautions for identified infection/condition  Outcome: Progressing  Goal: Absence of fever/infection during neutropenic period  Description: INTERVENTIONS:  - Monitor WBC    Outcome: Progressing     Problem: SAFETY ADULT  Goal: Patient will remain free of falls  Description: INTERVENTIONS:  - Educate patient/family on patient safety including physical limitations  - Instruct patient to call for assistance with activity   - Consult OT/PT to assist with strengthening/mobility   - Keep Call bell within reach  - Keep bed low and locked with side rails adjusted as appropriate  - Keep care items and personal belongings within reach  - Initiate and maintain comfort rounds  - Make Fall Risk Sign visible to staff  - Offer Toileting every 2 Hours, in advance of need  - Initiate/Maintain bed alarm  - Obtain necessary fall risk management equipment  - Apply yellow socks and bracelet for high fall risk patients  - Consider moving patient to room near nurses station  Outcome: Progressing  Goal: Maintain or return to baseline ADL function  Description: INTERVENTIONS:  -  Assess patient's ability to carry out ADLs; assess patient's baseline for ADL function and identify physical deficits which impact ability to perform ADLs (bathing, care of mouth/teeth, toileting, grooming, dressing, etc.)  - Assess/evaluate cause of self-care deficits   - Assess range of motion  - Assess patient's mobility; develop plan if impaired  - Assess patient's need for assistive devices and provide as appropriate  - Encourage maximum independence but intervene and supervise when necessary  - Involve family in performance of ADLs  - Assess for home care needs following discharge   - Consider OT consult to assist with ADL evaluation and planning for discharge  - Provide patient education as appropriate  Outcome: Progressing  Goal: Maintains/Returns to pre admission functional level  Description:  INTERVENTIONS:  - Perform AM-PAC 6 Click Basic Mobility/ Daily Activity assessment daily.  - Set and communicate daily mobility goal to care team and patient/family/caregiver.   - Collaborate with rehabilitation services on mobility goals if consulted  - Record patient progress and toleration of activity level   Outcome: Progressing     Problem: DISCHARGE PLANNING  Goal: Discharge to home or other facility with appropriate resources  Description: INTERVENTIONS:  - Identify barriers to discharge w/patient and caregiver  - Arrange for needed discharge resources and transportation as appropriate  - Identify discharge learning needs (meds, wound care, etc.)  - Arrange for interpretive services to assist at discharge as needed  - Refer to Case Management Department for coordinating discharge planning if the patient needs post-hospital services based on physician/advanced practitioner order or complex needs related to functional status, cognitive ability, or social support system  Outcome: Progressing     Problem: Knowledge Deficit  Goal: Patient/family/caregiver demonstrates understanding of disease process, treatment plan, medications, and discharge instructions  Description: Complete learning assessment and assess knowledge base.  Interventions:  - Provide teaching at level of understanding  - Provide teaching via preferred learning methods  Outcome: Progressing     Problem: Prexisting or High Potential for Compromised Skin Integrity  Goal: Skin integrity is maintained or improved  Description: INTERVENTIONS:  - Identify patients at risk for skin breakdown  - Assess and monitor skin integrity  - Assess and monitor nutrition and hydration status  - Monitor labs   - Assess for incontinence   - Turn and reposition patient  - Assist with mobility/ambulation  - Relieve pressure over bony prominences  - Avoid friction and shearing  - Provide appropriate hygiene as needed including keeping skin clean and dry  - Evaluate need  for skin moisturizer/barrier cream  - Collaborate with interdisciplinary team   - Patient/family teaching  - Consider wound care consult   Outcome: Progressing     Problem: Nutrition/Hydration-ADULT  Goal: Nutrient/Hydration intake appropriate for improving, restoring or maintaining nutritional needs  Description: Monitor and assess patient's nutrition/hydration status for malnutrition. Collaborate with interdisciplinary team and initiate plan and interventions as ordered.  Monitor patient's weight and dietary intake as ordered or per policy. Utilize nutrition screening tool and intervene as necessary. Determine patient's food preferences and provide high-protein, high-caloric foods as appropriate.     INTERVENTIONS:  - Monitor oral intake, urinary output, labs, and treatment plans  - Assess nutrition and hydration status and recommend course of action  - Evaluate amount of meals eaten  - Assist patient with eating if necessary   - Allow adequate time for meals  - Recommend/ encourage appropriate diets, oral nutritional supplements, and vitamin/mineral supplements  - Order, calculate, and assess calorie counts as needed  - Recommend, monitor, and adjust tube feedings and TPN/PPN based on assessed needs  - Assess need for intravenous fluids  - Provide specific nutrition/hydration education as appropriate  - Include patient/family/caregiver in decisions related to nutrition  Outcome: Progressing

## 2025-01-06 NOTE — DISCHARGE INSTR - AVS FIRST PAGE
Dear Satish Novak,     It was our pleasure to care for you here at [facility].  It is our hope that we were always able to exceed the expected standards for your care during your stay.  You were hospitalized due to shortness of breath.  You were cared for on the 3rd floor by Maribel Nichols DO under the service of Archana Stallworth MD with the [facility] who covers for your primary care physician (PCP), Cecelia Jarrell MD, while you were hospitalized.  If you have any questions or concerns related to this hospitalization, you may contact us at .  For follow up as well as any medication refills, we recommend that you follow up with your primary care physician.  A registered nurse will reach out to you by phone within a few days after your discharge to answer any additional questions that you may have after going home.  However, at this time we provide for you here, the most important instructions / recommendations at discharge:     Notable Medication Adjustments -   Starting 01/06/25, take one tablet of Seroquel 25mg at bedtime.    Starting 01/06/25, stop taking Lopressor 37.5 two times a day. Please follow up with your cardiologist for this medication change.    Starting 01/06/25, take one tablet of Lopresspr 25mg at 8:30pm.  Starting 01/07/25, take one tablet of Lopressor 25mg every 6 hours.   Starting 01/07/25, take one tablet of Prednisone 20mg by mouth for one day.   Testing Required after Discharge -   None  ** Please contact your PCP to request testing orders for any of the testing recommended here **  Important follow up information -   Please follow up with your PCP within 1 week of discharge.   Please follow up with your cardiologist within 1 week of discharge.  Other Instructions -   It was my pleasure taking care of you during this hospital visit.    Please review this entire after visit summary as additional general instructions  including medication list, appointments, activity, diet, any pertinent wound care, and other additional recommendations from your care team that may be provided for you.      Sincerely,     Maribel Nichols, DO

## 2025-01-06 NOTE — ASSESSMENT & PLAN NOTE
01/06/25 Platelets 147K  Platelets 130k on adx, similar to prior values in the past.  Liver enzymes within normal range on adx.  No evidence of bleeding at this time.   Stable

## 2025-01-06 NOTE — ASSESSMENT & PLAN NOTE
Presenting with tachycardia and tachypnea.  Afebrile.  No leukocytosis.  Lactic acid within normal range.    Patient received 1 L fluid bolus, 1 dose rocephin ER  On exam, appears euvolemic.    RP panel positive for coronovirus OC43    Plan:  See primary problem   Continue to monitor off antibiotics  Bcx2 no growth at 4 days

## 2025-01-06 NOTE — ASSESSMENT & PLAN NOTE
Per [facility] facility, at baseline patient understands his name but mostly uses word salad.  He does not understand the time, place, situation.  He is able to feed himself.  She reports that there were no acute mental changes noticed, he was at his baseline when EMS got there    Plan:  Delirium precautions frequent reorienting.  Minimize overnight interruptions  Fall precautions   C/w Seroquel 25 mg at bedtime.

## 2025-01-06 NOTE — CASE MANAGEMENT
Case Management Discharge Planning Note    Patient name Satish Novak  Location S /S -01 MRN [id number]  : 1931 Date 2025       Current Admission Date: 2025  Current Admission Diagnosis:Shortness of breath   Patient Active Problem List    Diagnosis Date Noted Date Diagnosed    Shortness of breath 2025     Sepsis (HCC) 2025     Insomnia 2024     Hard of hearing 2024     Metabolic encephalopathy 2024     Atrial fibrillation with RVR (HCC) 2024     Hospital discharge follow-up 2023     Acute gout of left foot 2023     Chronic thrombocytopenia (HCC) 2023     Sensory neural hearing loss 2023     Hyperlipidemia 2023     Prediabetes 2023     STEPHON (acute kidney injury) ([facility]) 2023     Fall 2023     Paroxysmal atrial fibrillation ([facility]) 2023     Dementia ([facility]) 2023       LOS (days): 5  Geometric Mean LOS (GMLOS) (days): 3.5  Days to GMLOS:-1.5     OBJECTIVE:  Risk of Unplanned Readmission Score: 17.06         Current admission status: Inpatient   Preferred Pharmacy:   [place] [address]  Phone: [phone] Fax: [phone]    Primary Care Provider: Cecelia Jarrell MD    Primary Insurance: [employer]  Secondary Insurance: [facility]    DISCHARGE DETAILS:         Accepting Facility Name, City & State : [facility]  Receiving Facility/Agency Phone Number: [phone]  Facility/Agency Fax Number: [phone]       Per SLIM, Pt is medically ready for discharge. Pt is a LTC resident at [facility] and can return there today. Pts legal guardian, Teena updated via phone.    BLS requested- CM will confirm p/u time.     RN and SLIM provider aware.

## 2025-01-06 NOTE — ASSESSMENT & PLAN NOTE
Patient presented initially with shortness of breath/tachypnea at his facility and was found to have O2 sats in the 80's with audible wheezing.  In ED, received nebulizer treatments with noted improvement.  He is now on room air saturating in the upper 90s.  No increased sputum production with cough, no history of COPD.  He has a home rescue inhaler but only uses it every couple of months per facility.  Patient is afebrile with no leukocytosis. Pro-Alfred and lactic acid within normal range.    Chest x-ray not showing any obvious infiltrates.    Received cefepime and vancomycin in the ER   RP2 panel positive for coronovirus OC43  On exam, improved breath sounds, no wheezing appreciated.  Plan:  Given prescription for one tablet of prednisone 20mg tomorrow, 01/07  Will completed 5 day course on 01/07

## 2025-01-06 NOTE — NURSING NOTE
Pt alert to self and confused, following commands, on ra , denies pain, AVS sent, report given to receiving facility, NO iv in place, monitors disconnected. DC to facility with transport.

## 2025-01-07 NOTE — UTILIZATION REVIEW
NOTIFICATION OF ADMISSION DISCHARGE   This is a Notification of Discharge from [facility]. Please be advised that this patient has been discharge from our facility. Below you will find the admission and discharge date and time including the patient’s disposition.   UTILIZATION REVIEW CONTACT:  Luz Elena Garcia  Utilization   Network Utilization Review Department  Phone: [phone] x carefully listen to the prompts. All voicemails are confidential.  Email: [email or web address]     ADMISSION INFORMATION  PRESENTATION DATE: 1/1/2025 11:20 AM  OBERVATION ADMISSION DATE: N/A  INPATIENT ADMISSION DATE: 1/1/25  2:22 PM   DISCHARGE DATE: 1/6/2025  4:32 PM   [facility]    Network Utilization Review Department  ATTENTION: Please call with any questions or concerns to [phone] and carefully listen to the prompts so that you are directed to the right person. All voicemails are confidential.   For Discharge needs, contact Care Management DC Support Team at [phone] opt. 2  Send all requests for admission clinical reviews, approved or denied determinations and any other requests to dedicated fax number below belonging to the campus where the patient is receiving treatment. List of dedicated fax numbers for the Facilities:  FACILITY NAME UR FAX NUMBER   ADMISSION DENIALS (Administrative/Medical Necessity) [phone]   DISCHARGE SUPPORT TEAM ([facility]) [phone]   PARENT CHILD HEALTH (Maternity/NICU/Pediatrics) [phone]   [facility] [phone]   [facility] [phone]   [facility] [phone]   [facility] [phone]   [facility] [phone]   [facility] [phone]   [facility] [phone]   [facility] [phone]    [facility] [phone]   [facility] [phone]   [facility] [phone]   [facility] [phone]

## 2025-02-02 PROBLEM — A41.9 SEPSIS (HCC): Status: RESOLVED | Noted: 2025-01-01 | Resolved: 2025-02-02

## 2025-02-25 ENCOUNTER — APPOINTMENT (EMERGENCY)
Dept: CT IMAGING | Facility: HOSPITAL | Age: OVER 89
End: 2025-02-25
Payer: COMMERCIAL

## 2025-02-25 ENCOUNTER — HOSPITAL ENCOUNTER (EMERGENCY)
Facility: HOSPITAL | Age: OVER 89
Discharge: HOME | End: 2025-02-25
Attending: SURGERY | Admitting: SURGERY
Payer: COMMERCIAL

## 2025-02-25 ENCOUNTER — APPOINTMENT (EMERGENCY)
Dept: RADIOLOGY | Facility: HOSPITAL | Age: OVER 89
End: 2025-02-25
Payer: COMMERCIAL

## 2025-02-25 VITALS
OXYGEN SATURATION: 98 % | WEIGHT: 164.9 LBS | SYSTOLIC BLOOD PRESSURE: 163 MMHG | HEART RATE: 83 BPM | RESPIRATION RATE: 20 BRPM | TEMPERATURE: 98 F | DIASTOLIC BLOOD PRESSURE: 100 MMHG
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
## 2025-02-25 DIAGNOSIS — S70.11XA TRAUMATIC ECCHYMOSIS OF RIGHT THIGH, INITIAL ENCOUNTER: ICD-10-CM

## 2025-02-25 LAB
BASE EXCESS BLDA CALC-SCNC: 3 MMOL/L (ref -2–3)
CA-I BLD-SCNC: 1.2 MMOL/L (ref 1.12–1.32)
GLUCOSE SERPL-MCNC: 98 MG/DL (ref 65–140)
HCO3 BLDA-SCNC: 29.6 MMOL/L (ref 24–30)
HCT VFR BLD CALC: 45 % (ref 36.5–49.3)
HGB BLDA-MCNC: 15.3 G/DL (ref 12–17)
HOLD SPECIMEN: NORMAL
HOLD SPECIMEN: NORMAL
PCO2 BLD: 31 MMOL/L (ref 21–32)
PCO2 BLD: 51.2 MM HG (ref 42–50)
PH BLD: 7.37 [PH] (ref 7.3–7.4)
PO2 BLD: 19 MM HG (ref 35–45)
POTASSIUM BLD-SCNC: 5.4 MMOL/L (ref 3.5–5.3)
SAO2 % BLD FROM PO2: 27 % (ref 60–85)
SODIUM BLD-SCNC: 140 MMOL/L (ref 136–145)
SPECIMEN SOURCE: ABNORMAL

## 2025-02-25 PROCEDURE — 99215 OFFICE O/P EST HI 40 MIN: CPT | Performed by: SURGERY

## 2025-02-25 PROCEDURE — 99284 EMERGENCY DEPT VISIT MOD MDM: CPT

## 2025-02-25 PROCEDURE — 36415 COLL VENOUS BLD VENIPUNCTURE: CPT | Performed by: EMERGENCY MEDICINE

## 2025-02-25 PROCEDURE — 70450 CT HEAD/BRAIN W/O DYE: CPT

## 2025-02-25 PROCEDURE — 85014 HEMATOCRIT: CPT

## 2025-02-25 PROCEDURE — 72125 CT NECK SPINE W/O DYE: CPT

## 2025-02-25 PROCEDURE — 73502 X-RAY EXAM HIP UNI 2-3 VIEWS: CPT

## 2025-02-25 PROCEDURE — 82947 ASSAY GLUCOSE BLOOD QUANT: CPT

## 2025-02-25 PROCEDURE — 93308 TTE F-UP OR LMTD: CPT | Performed by: SURGERY

## 2025-02-25 PROCEDURE — 82330 ASSAY OF CALCIUM: CPT

## 2025-02-25 PROCEDURE — 71045 X-RAY EXAM CHEST 1 VIEW: CPT

## 2025-02-25 PROCEDURE — EDAIR PR ED AIR: Performed by: EMERGENCY MEDICINE

## 2025-02-25 PROCEDURE — 84295 ASSAY OF SERUM SODIUM: CPT

## 2025-02-25 PROCEDURE — 84132 ASSAY OF SERUM POTASSIUM: CPT

## 2025-02-25 PROCEDURE — 82803 BLOOD GASES ANY COMBINATION: CPT

## 2025-02-25 PROCEDURE — 76705 ECHO EXAM OF ABDOMEN: CPT | Performed by: SURGERY

## 2025-02-26 NOTE — QUICK NOTE
Cervical Collar Clearance:    The patient had a CT scan of the cervical spine demonstrating no acute injury. On exam, the patient had no midline point tenderness or paresthesias/numbness/weakness in the extremities. The patient had full range of motion (was then able to flex, extend, and rotate head laterally) without pain. There were no distracting injuries and the patient was not intoxicated.      The patient's cervical spine was cleared radiologically and clinically. Cervical collar removed at this time.     Chito Coughlin MD  2/25/2025 8:47 PM

## 2025-02-26 NOTE — PROCEDURES
POC FAST US    Date/Time: 2/25/2025 8:46 PM    Performed by: Chito Coughlin MD  Authorized by: Chito Coughlin MD    Patient location:  Trauma  Procedure details:     Exam Type:  Diagnostic    Indications comment:  Fall    Assess for:  Hemothorax, intra-abdominal fluid and pericardial effusion    Technique: FAST      Views obtained:  Heart - Pericardial sac, RUQ - Nguyen's Pouch, LUQ - Splenorenal space and Suprapubic - Pouch of Arun    Image quality: diagnostic      Image availability:  Images available in PACS  FAST Findings:     RUQ (Hepatorenal) free fluid: absent      LUQ (Splenorenal) free fluid: absent      Suprapubic free fluid: absent      Cardiac wall motion: identified      Pericardial effusion: absent    Interpretation:     Impressions: negative

## 2025-02-26 NOTE — ASSESSMENT & PLAN NOTE
Patient presents from his skilled nursing facility after a fall with unknown head strike while on Eliquis.  Patient has history of dementia and is poor historian.  Noted to have bruising on the right lateral thigh.    -CTH and cervical spine  -Cervical spinal collar  -Multimodal pain regimen as needed

## 2025-02-26 NOTE — H&P
H&P - Trauma   Name: Satish Novak 89 y.o. male I MRN: [id number]  Unit/Bed#: ED-22 I Date of Admission: 2/25/2025   Date of Service: 2/25/2025 I Hospital Day: 0     Assessment & Plan  Fall, initial encounter  Patient presents from his skilled nursing facility after a fall with unknown head strike while on Eliquis.  Patient has history of dementia and is poor historian.  Noted to have bruising on the right lateral thigh.    -CTH and cervical spine  -Cervical spinal collar  -Multimodal pain regimen as needed    Traumatic ecchymosis of right thigh, initial encounter  Physical exam showed ecchymosis of the right lateral thigh.    -Pelvis and right hip xray images    Trauma Alert: Level B   Model of Arrival: Ambulance    Trauma Team: Attending Danny and Residents Ced  Consultants:     None     History of Present Illness   Chief Complaint: Fall while on Eliquis but unknown head strike.  Mechanism:Fall     Satish Novak is a 89 y.o. male who presents to the trauma bay via ambulance after a fall at his nursing facility.  As per EMS, he was found on the floor with unknown head strike but he does however take Eliquis.  Patient has no external signs of trauma no acute complaints at this time.  Patient has history of dementia and is unreliable historian.  No other acute concerns at this time. Denies chest pain, SOB, cough, abdominal pain, n/v/d, fever, chills, dizziness, lightheadedness, HA, dysuria, hematuria, hematochezia, or melena.       Review of Systems   Unable to perform ROS: Dementia     Medical History Review: I have reviewed the patient's PMH, PSH, Social History, Family History, Meds, and Allergies     There is no immunization history on file for this patient.  Last Tetanus: 2018    1. Before the illness or injury that brought you to the Emergency, did you need someone to help you on a regular basis? 1=Yes   2. Since the illness or injury that brought you to the Emergency, have you needed more help than  usual to take care of yourself? 1=Yes   3. Have you been hospitalized for one or more nights during the past 6 months (excluding a stay in the Emergency Department)? 0=No   4. In general, do you see well? 0=Yes   5. In general, do you have serious problems with your memory? 1=Yes   6. Do you take more than three different medications everyday? 1=Yes   TOTAL   4     Did you order a geriatric consult if the score was 2 or greater?: no       Objective :  Temp:  [98 °F (36.7 °C)] 98 °F (36.7 °C)  HR:  [] 81  BP: (102-153)/(56-99) 103/67  Resp:  [16-18] 18  SpO2:  [93 %-97 %] 96 %  O2 Device: None (Room air)    Initial Vitals:   Temperature: 98 °F (36.7 °C) (02/25/25 2000)  Pulse: (!) 113 (02/25/25 2000)  Respirations: 18 (02/25/25 2000)  Blood Pressure: 132/86 (02/25/25 2000)    Primary Survey:   Airway:        Status: patent;        Pre-hospital Interventions: none        Hospital Interventions: none  Breathing:        Pre-hospital Interventions: none       Effort: normal       Right breath sounds: normal       Left breath sounds: normal  Circulation:        Rhythm: regular       Rate: regular   Right Pulses Left Pulses    R radial: 2+    R pedal: 2+     L radial: 2+    L pedal: 2+       Disability:        GCS: Eye: 4; Verbal: 4 Motor: 6 Total: 14       Right Pupil: 3 mm;  round;  reactive         Left Pupil:  round;  reactive      R Motor Strength L Motor Strength    R : 5/5  R dorsiflex: 5/5  R plantarflex: 5/5 L : 5/5  L dorsiflex: 5/5  L plantarflex: 5/5        Sensory:  No sensory deficit  Exposure:       Completed: Yes      Secondary Survey:  Physical Exam  Vitals and nursing note reviewed.   Constitutional:       General: He is not in acute distress.     Appearance: Normal appearance. He is normal weight.   HENT:      Head: Normocephalic and atraumatic.      Right Ear: External ear normal.      Left Ear: External ear normal.      Nose: Nose normal.      Mouth/Throat:      Pharynx: Oropharynx is  clear.   Eyes:      Extraocular Movements: Extraocular movements intact.      Conjunctiva/sclera: Conjunctivae normal.      Pupils: Pupils are equal, round, and reactive to light.   Cardiovascular:      Pulses: Normal pulses.      Heart sounds: Normal heart sounds.   Pulmonary:      Effort: Pulmonary effort is normal. No respiratory distress.      Breath sounds: Normal breath sounds. No wheezing, rhonchi or rales.   Abdominal:      General: There is no distension.      Tenderness: There is no abdominal tenderness.   Musculoskeletal:         General: Normal range of motion.      Cervical back: Normal range of motion.   Skin:     General: Skin is warm.      Findings: Bruising present.      Comments: Ecchymosis along the right lateral thigh.   Neurological:      General: No focal deficit present.      Mental Status: He is alert and oriented to person, place, and time. Mental status is at baseline.             Lab Results: I have reviewed the following results:  Recent Labs     02/25/25 2013   HGB 15.3   HCT 45   CO2 31   CAIONIZED 1.20       Imaging Results: I have personally reviewed pertinent images saved in PACS. CT scan findings (and other pertinent positive findings on images) were discussed with radiology. My interpretation of the images/reports are as follows:  Chest Xray(s): negative for acute findings   FAST exam(s): negative for acute findings   CT Scan(s): negative for acute findings   Additional Xray(s): pending     Other Studies:

## 2025-02-26 NOTE — ED PROVIDER NOTES
Emergency Department Airway Evaluation and Management Form    History  Obtained from: EMS/Patient  Patient has no allergy information on record.  Chief Complaint   Patient presents with    Trauma     HPI    89-year-old male brought by EMS from nursing facility for evaluation after he was found on the ground due to possible fall.  He takes Eliquis.  He is unable to provide much meaningful history.  Awake and alert breathing comfortably on room air with normal saturation.  Further management per trauma team.    No past medical history on file.  No past surgical history on file.  No family history on file.     I have reviewed and agree with the history as documented.    Review of Systems    Physical Exam  /99   Pulse 97   Temp 98 °F (36.7 °C)   Resp 18   Wt 74.8 kg (164 lb 14.5 oz)   SpO2 97%     Physical Exam    ED Medications  Medications - No data to display    Intubation  Procedures    Notes      Final Diagnosis  Final diagnoses:   None       ED Provider  Electronically Signed by     Tera Carroll MD  02/25/25 2011

## 2025-02-27 LAB — HOLD SPECIMEN: NORMAL
